# Patient Record
Sex: MALE | Race: WHITE | NOT HISPANIC OR LATINO | Employment: OTHER | URBAN - METROPOLITAN AREA
[De-identification: names, ages, dates, MRNs, and addresses within clinical notes are randomized per-mention and may not be internally consistent; named-entity substitution may affect disease eponyms.]

---

## 2017-05-24 ENCOUNTER — DOCUMENTATION ONLY (OUTPATIENT)
Dept: CARDIOLOGY | Facility: CLINIC | Age: 67
End: 2017-05-24

## 2017-05-26 ENCOUNTER — TRANSFERRED RECORDS (OUTPATIENT)
Dept: HEALTH INFORMATION MANAGEMENT | Facility: CLINIC | Age: 67
End: 2017-05-26

## 2017-09-29 ENCOUNTER — ALLIED HEALTH/NURSE VISIT (OUTPATIENT)
Dept: CARDIOLOGY | Facility: CLINIC | Age: 67
End: 2017-09-29
Payer: COMMERCIAL

## 2017-09-29 ENCOUNTER — OFFICE VISIT (OUTPATIENT)
Dept: CARDIOLOGY | Facility: CLINIC | Age: 67
End: 2017-09-29
Payer: COMMERCIAL

## 2017-09-29 VITALS
SYSTOLIC BLOOD PRESSURE: 137 MMHG | DIASTOLIC BLOOD PRESSURE: 76 MMHG | OXYGEN SATURATION: 97 % | HEIGHT: 69 IN | HEART RATE: 64 BPM | WEIGHT: 261.8 LBS | BODY MASS INDEX: 38.78 KG/M2

## 2017-09-29 DIAGNOSIS — E11.40 TYPE 2 DIABETES MELLITUS WITH DIABETIC NEUROPATHY, WITH LONG-TERM CURRENT USE OF INSULIN (H): ICD-10-CM

## 2017-09-29 DIAGNOSIS — I47.29 PAROXYSMAL VENTRICULAR TACHYCARDIA (H): ICD-10-CM

## 2017-09-29 DIAGNOSIS — Z79.4 TYPE 2 DIABETES MELLITUS WITH DIABETIC NEUROPATHY, WITH LONG-TERM CURRENT USE OF INSULIN (H): ICD-10-CM

## 2017-09-29 DIAGNOSIS — I25.10 CORONARY ARTERY DISEASE INVOLVING NATIVE CORONARY ARTERY OF NATIVE HEART WITHOUT ANGINA PECTORIS: Primary | ICD-10-CM

## 2017-09-29 DIAGNOSIS — Z95.810 ICD (IMPLANTABLE CARDIOVERTER-DEFIBRILLATOR) IN PLACE: Primary | ICD-10-CM

## 2017-09-29 DIAGNOSIS — Z95.810 AUTOMATIC IMPLANTABLE CARDIOVERTER-DEFIBRILLATOR IN SITU: ICD-10-CM

## 2017-09-29 DIAGNOSIS — I25.5 ISCHEMIC CARDIOMYOPATHY: ICD-10-CM

## 2017-09-29 DIAGNOSIS — Z95.1 S/P CABG (CORONARY ARTERY BYPASS GRAFT): ICD-10-CM

## 2017-09-29 PROCEDURE — 99204 OFFICE O/P NEW MOD 45 MIN: CPT | Mod: 25 | Performed by: INTERNAL MEDICINE

## 2017-09-29 PROCEDURE — 93000 ELECTROCARDIOGRAM COMPLETE: CPT | Performed by: INTERNAL MEDICINE

## 2017-09-29 PROCEDURE — 93283 PRGRMG EVAL IMPLANTABLE DFB: CPT | Performed by: INTERNAL MEDICINE

## 2017-09-29 RX ORDER — GABAPENTIN 300 MG/1
300 CAPSULE ORAL
COMMUNITY
End: 2017-10-26

## 2017-09-29 RX ORDER — NITROGLYCERIN 0.4 MG/1
0.4 TABLET SUBLINGUAL EVERY 5 MIN PRN
COMMUNITY

## 2017-09-29 RX ORDER — METFORMIN HCL 500 MG
500 TABLET, EXTENDED RELEASE 24 HR ORAL
COMMUNITY

## 2017-09-29 RX ORDER — ATORVASTATIN CALCIUM 80 MG/1
80 TABLET, FILM COATED ORAL DAILY
COMMUNITY
End: 2022-05-18

## 2017-09-29 RX ORDER — LISINOPRIL 40 MG/1
40 TABLET ORAL DAILY
COMMUNITY
End: 2022-03-15 | Stop reason: DRUGHIGH

## 2017-09-29 RX ORDER — SPIRONOLACTONE 25 MG/1
75 TABLET ORAL DAILY
COMMUNITY
End: 2022-05-18

## 2017-09-29 RX ORDER — FEXOFENADINE HCL 60 MG/1
60 TABLET, FILM COATED ORAL 2 TIMES DAILY
COMMUNITY
End: 2017-10-26

## 2017-09-29 RX ORDER — METOPROLOL SUCCINATE 100 MG/1
200 TABLET, EXTENDED RELEASE ORAL DAILY
COMMUNITY
End: 2022-03-15 | Stop reason: DRUGHIGH

## 2017-09-29 NOTE — MR AVS SNAPSHOT
After Visit Summary   9/29/2017    Go Saavedra    MRN: [id number]           Patient Information     Date Of Birth          1950        Visit Information        Provider Department      9/29/2017 1:30 PM Nga Deleon MD [facility] [place]        Today's Diagnoses     Coronary artery disease involving native coronary artery of native heart without angina pectoris    -  1    S/P CABG (coronary artery bypass graft)        Ischemic cardiomyopathy        Automatic implantable cardioverter-defibrillator in situ        Type 2 diabetes mellitus with diabetic neuropathy, with long-term current use of insulin (H)          Care Instructions    1. I recommend a heart ultrasound or transthoracic echocardiogram to assess your heart pump function. My nurse will call you with the results. If anything is unexpected, we will set up an office visit to discuss options.    2. Follow up in 1 year.    If you have any questions or concerns, please call my nurse Lexie Ridley at [phone].            Follow-ups after your visit        Additional Services     Follow-Up with Cardiologist                 Your next 10 appointments already scheduled     Jan 18, 2018  4:30 PM CST   Remote ICD Check with BEATTY DCR2   [facility] [place] ([facility])    [address] [place] [id number] [phone]           This appointment is for a remote check of your debrillator.  This is not an appointment at the office.              Future tests that were ordered for you today     Open Future Orders        Priority Expected Expires Ordered    Echocardiogram Routine 10/2/2017 9/29/2018 9/29/2017    Follow-Up with Cardiologist Routine 8/29/2018 9/29/2018 9/29/2017            Who to contact     If you have questions or need follow up information about today's clinic visit or your schedule please contact [facility]  "[facility]YS[facility] HE[facility] AT [place] directly at [phone].  Normal or non-critical lab and imaging results will be communicated to you by MyChart, letter or phone within 4 business days after the clinic has received the results. If you do not hear from us within 7 days, please contact the clinic through MyChart or phone. If you have a critical or abnormal lab result, we will notify you by phone as soon as possible.  Submit refill requests through Sun[employer] or call your pharmacy and they will forward the refill request to us. Please allow 3 business days for your refill to be completed.          Additional Information About Your Visit        fflickharHybrid Security Information     Suncore lets you send messages to your doctor, view your test results, renew your prescriptions, schedule appointments and more. To sign up, go to [email or web address] . Click on \"Log in\" on the left side of the screen, which will take you to the Welcome page. Then click on \"Sign up Now\" on the right side of the page.     You will be asked to enter the access code listed below, as well as some personal information. Please follow the directions to create your username and password.     Your access code is: [email or web address]  Expires: 2017  1:49 PM     Your access code will  in 90 days. If you need help or a new code, please call your [place] clinic or [phone].        Care EveryWhere ID     This is your Care EveryWhere ID. This could be used by other organizations to access your [place] medical records  [id number]        Your Vitals Were     Pulse Height Pulse Oximetry BMI (Body Mass Index)          64 1.74 m (5' 8.5\") 97% 39.23 kg/m2         Blood Pressure from Last 3 Encounters:   17 137/76    Weight from Last 3 Encounters:   17 118.8 kg (261 lb 12.8 oz)              We Performed the Following     EKG 12-lead complete w/read - Clinics        Primary Care Provider Office Phone # Fax #    Edwina Rodriguez [phone] "       [facility] [address] [place] [address]        Equal Access to Services     [facility] : Hadii aad ku hadlashello Sojuanali, waaxda luqadaha, qaybta kaalmada adepatida, pati yisselin hayaadee sandovalmaisha garibay cayden valentin. So [facility] [phone].    ATENCIÓN: Si habla español, tiene a owens disposición servicios gratuitos de asistencia lingüística. Llame al [phone].    We comply with applicable federal civil rights laws and [place] laws. We do not discriminate on the basis of race, color, national origin, age, disability, sex, sexual orientation, or gender identity.            Thank you!     Thank you for choosing [facility] AT [place]  for your care. Our goal is always to provide you with excellent care. Hearing back from our patients is one way we can continue to improve our services. Please take a few minutes to complete the written survey that you may receive in the mail after your visit with us. Thank you!             Your Updated Medication List - Protect others around you: Learn how to safely use, store and throw away your medicines at www.disposemymeds.org.          This list is accurate as of: 9/29/17  2:37 PM.  Always use your most recent med list.                   Brand Name Dispense Instructions for use Diagnosis    ASPIR-81 PO      Take 1 tablet by mouth daily        atorvastatin 80 MG tablet    LIPITOR     Take 80 mg by mouth daily        COENZYME Q-10 PO      Take 3 capsules by mouth daily        fexofenadine 60 MG tablet    ALLEGRA     Take 60 mg by mouth 2 times daily        gabapentin 300 MG capsule    NEURONTIN     Take 300 mg by mouth 2 capsules am, 3 capsules pm        IBUPROFEN PO      Take 200 mg by mouth every 6 hours as needed for moderate pain        LANTUS SOLOSTAR 100 UNIT/ML injection   Generic drug:  insulin glargine      Inject 58 Units Subcutaneous At Bedtime        lisinopril 40 MG tablet    PRINIVIL/ZESTRIL     Take 40 mg by  mouth daily        MELATONIN PO      Take 5 mg by mouth At Bedtime        metFORMIN 500 MG 24 hr tablet    GLUCOPHAGE-XR     Take 500 mg by mouth daily (with dinner)        metoprolol 100 MG 24 hr tablet    TOPROL-XL     Take 200 mg by mouth daily        nitroGLYcerin 0.4 MG sublingual tablet    NITROSTAT     Place 0.4 mg under the tongue every 5 minutes as needed for chest pain For chest pain place 1 tablet under the tongue every 5 minutes for 3 doses. If symptoms persist 5 minutes after 1st dose call 911.        NOVOFINE 30G X 8 MM   Generic drug:  insulin pen needle      Use 5 pen needles daily or as directed.        NovoLOG FLEXPEN 100 UNIT/ML injection   Generic drug:  insulin aspart      Inject 26 Units Subcutaneous 3 times daily (with meals)        spironolactone 25 MG tablet    ALDACTONE     Take 50 mg by mouth daily

## 2017-09-29 NOTE — PROGRESS NOTES
Dictation reference number - [id number]    REFERRING PROVIDER:  Edwina Rodriguez MD  [facility] [address] [place] [address]    PRIMARY CARE PROVIDER:  Edwina Rodriguez [facility] [address] [place] [address]        REVIEW OF SYSTEMS:  A comprehensive 10-point review of systems was completed and the pertinent positives are documented in the history of present illness.    Skin:  Negative     Eyes:  Positive for glasses  ENT:  Negative    Respiratory:  Positive for dyspnea on exertion  Cardiovascular:  Negative    Gastroenterology: Negative    Genitourinary:  Negative    Musculoskeletal:  Negative    Neurologic:  Positive for numbness or tingling of feet (neuropathy)  Psychiatric:  Negative    Heme/Lymph/Imm:  Negative    Endocrine:  Positive for diabetes    CURRENT MEDICATIONS:  Current Outpatient Prescriptions   Medication Sig Dispense Refill     atorvastatin (LIPITOR) 80 MG tablet Take 80 mg by mouth daily       Aspirin (ASPIR-81 PO) Take 1 tablet by mouth daily       COENZYME Q-10 PO Take 3 capsules by mouth daily       fexofenadine (ALLEGRA) 60 MG tablet Take 60 mg by mouth 2 times daily       gabapentin (NEURONTIN) 300 MG capsule Take 300 mg by mouth 2 capsules am, 3 capsules pm       IBUPROFEN PO Take 200 mg by mouth every 6 hours as needed for moderate pain       insulin aspart (NOVOLOG FLEXPEN) 100 UNIT/ML injection Inject 26 Units Subcutaneous 3 times daily (with meals)       insulin glargine (LANTUS SOLOSTAR) 100 UNIT/ML injection Inject 58 Units Subcutaneous At Bedtime       lisinopril (PRINIVIL/ZESTRIL) 40 MG tablet Take 40 mg by mouth daily       MELATONIN PO Take 5 mg by mouth At Bedtime       metFORMIN (GLUCOPHAGE-XR) 500 MG 24 hr tablet Take 500 mg by mouth daily (with dinner)       metoprolol (TOPROL-XL) 100 MG 24 hr tablet Take 200 mg by mouth daily       nitroGLYcerin (NITROSTAT) 0.4 MG sublingual tablet Place 0.4 mg under the tongue every 5  "minutes as needed for chest pain For chest pain place 1 tablet under the tongue every 5 minutes for 3 doses. If symptoms persist 5 minutes after 1st dose call 911.       insulin pen needle (NOVOFINE) 30G X 8 MM Use 5 pen needles daily or as directed.       spironolactone (ALDACTONE) 25 MG tablet Take 50 mg by mouth daily           ALLERGIES:  No known allergies.    PAST MEDICAL HISTORY:    Past Medical History:   Diagnosis Date     Coronary artery disease      Diabetes mellitus (H)      History of coronary artery bypass graft x 3 2008     Hyperlipidemia      Ischemic cardiomyopathy 2008     MI (myocardial infarction) (H) 11/2008    inferior     Ventricular tachycardia (H)        PAST SURGICAL HISTORY:    Past Surgical History:   Procedure Laterality Date     BYPASS GRAFT ARTERY CORONARY  11/2008    3 vessel w/ LIMA to LAD, SVG to Diagl, SVG to PDA     HC LEFT HEART CATHETERIZATION  10/2008    Emergent thrombectomy and stent to distal RCA     IMPLANT IMPLANTABLE CARDIOVERTER DEFIBRILLATOR  11/2008    dual chamber ICD       FAMILY HISTORY:    Family History   Problem Relation Age of Onset     CANCER Brother      DIABETES No family hx of      Hypertension No family hx of        SOCIAL HISTORY:    Social History     Social History     Marital status:      Spouse name: N/A     Number of children: N/A     Years of education: N/A     Social History Main Topics     Smoking status: Never Smoker     Smokeless tobacco: Never Used     Alcohol use No     Drug use: None     Sexual activity: Not Asked     Other Topics Concern     None     Social History Narrative       PHYSICAL EXAM:    Vitals: /76  Pulse 64  Ht 1.74 m (5' 8.5\")  Wt 118.8 kg (261 lb 12.8 oz)  SpO2 97%  BMI 39.23 kg/m2  Wt Readings from Last 5 Encounters:   09/29/17 118.8 kg (261 lb 12.8 oz)       Constitutional: Obese body habitus. Comfortable at rest. Cooperative, alert and oriented, well developed, well nourished.  Eyes: Pupils equal and round, " conjunctivae and lids unremarkable, sclera white, no xanthalasma,   ENT: Satisfactory dentition.  No cyanosis or pallor.  Neck: Carotid pulses are full and equal bilaterally, no carotid bruit, no thyromegaly     Respiratory: Normal respiratory effort with symmetrical chest wall movements and no use of accessory muscles. Good air entry with normal breath sounds and no rales or wheeze.  Cardiovascular: Normal jugular venous pulse and pressure.  Normal carotid pulse character and volume.  No carotid bruit.  Midline sternotomy scar is well healed. Pacemaker site is unremarkable. Apical impulse is undisplaced and normal to palpation without parasternal heave or thrill.  Heart sounds are normal and regular. No audible murmur. No S3, S4 or friction rub.    GI: Soft, nontender, no hepatosplenomegaly, no masses, active bowel sounds.    Skin: No rash, erythema, ecchymosis.  Musculoskeletal: Normal muscle tone and strength. Normal gait. No spinal deformities.  Neuropsychiatric: Oriented to time place and person.  Affect normal.  No gross motor deficits.  Extremities: No edema. No clubbing or deformities.            Encounter Diagnoses   Name Primary?     Coronary artery disease involving native coronary artery of native heart without angina pectoris Yes     S/P CABG (coronary artery bypass graft)      Ischemic cardiomyopathy      Automatic implantable cardioverter-defibrillator in situ      Type 2 diabetes mellitus with diabetic neuropathy, with long-term current use of insulin (H)        Orders Placed This Encounter   Procedures     Follow-Up with Cardiologist     EKG 12-lead complete w/read - Clinics     Echocardiogram       CC  REFERRING PROVIDER:  Edwina Rodriguez MD  [facility] [address] [place] [address]

## 2017-09-29 NOTE — LETTER
9/29/2017    Edwina Rodriguez MD  [facility]  [address] [place] [address]    RE: Go Saavedra       Dear Colleague,    I had the pleasure of seeing Go Saavedra in the [facility].    REFERRING PROVIDER:  Edwina Rodriguez MD       REASONS FOR VISIT:   1.  Followup of coronary artery disease, status post previous inferior myocardial infarction and CAD x3 in 2008.   2.  Followup of ischemic cardiomyopathy.     The patient is known to Cardiology and was previously seen by my cardiologist colleague, Dr. Adam Morillo, but has been lost to followup since 2011.  He is a 67-year-old, obese  gentleman, a retired , who is known to have coronary artery disease, status post inferior myocardial infarction in 2008.  Because his symptoms were heartburn rather than classical angina, he presented late 5 days later and had a completed infarct.  His ejection fraction at that time was 35%.  He did have a late and unsuccessful revascularization to the right coronary artery and subsequently underwent CABG x3 (LIMA to LAD, vein graft to diagonal and posterior descending artery) at [facility] in 11/2008.  Subsequently, due to his LVEF of 35% and nonsustained ventricular tachycardia, he had a defibrillator put in.  In 2009, he had a defibrillator discharge while exercising on the treadmill, but essentially has been asymptomatic since then.  His last LVEF was 35%-40% by echo in 11/2008 and subsequently improved to 42% on echo in 01/2009.  The patient has remained angina free since then.  He was last seen in Cardiology in 2011 and has been lost to followup since then.  However, he gets his regular primary care by Dr. Rodriguez, who has done an excellent job managing his cardiac medications.      The patient's primary risk factors are obesity, history of hypertension, type 2 diabetes mellitus (on insulin and complicated by neuropathy, HbA1c 6.7%) and  dyslipidemia with a low HDL.  He has no family history of ischemic heart disease or sudden death.  He is a lifelong never tobacco user.  He has not been screened for sleep apnea, and clinical questioning for this suggests low probability, although he is centrally obese.  He had his ICD interrogated earlier today, and it showed nonsustained VT for 10 beats at a heart rate of 156 BPM, during which he was asymptomatic.  There was no anti-tachycardic pacing or ICD discharge.  His pacemaker is a [employer] Teligen device, which is at mode of DDDR with 6% pacing and no V pacing and an underlying rhythm of sinus with excellent heart rate variability.  The device longevity is approximately 3 years.      I reviewed his labs from Care Everywhere, and his most recent lipid profile in 03/2017 shows a total cholesterol of 132, HDL 30, triglycerides 112 and LDL 80.  Hemoglobin A1c is 6.7%.  There is no recent TSH.      CURRENT MEDICATIONS:   1.  Atorvastatin 80 mg daily.   2.  Aspirin 81 mg daily.   3.  Insulin.    4.  Lisinopril 40 mg daily (increased today from 20-40 mg by Edwina Rodriguez).   5.  Metformin 500 mg daily.   6.  Metoprolol  mg daily.   7.  Spironolactone 50 mg daily.   8.  Sublingual nitroglycerin.   9.  Ibuprofen as needed, very rare.     10.  Fexofenadine 60 mg b.i.d.    11.  Gabapentin for neuropathy.   12.  Coenzyme Q10.      ALLERGIES:  No medication allergies.      See my attached note in Epic for a comprehensive review of systems, medications, past medical history, surgical history, social history and family history.        PHYSICAL EXAMINATION:   VITAL SIGNS:  Blood pressure 137/76 mmHg, pulse 64 per minute and regular, height 1.74 m, weight 118.8 kg (261 pounds), respiratory rate 16 per minute, saturations 97% on room air, BMI 39.23 kg/m2.      The patient is centrally obese and comfortable at rest.  His midline sternotomy scar is well healed.  The pacemaker device site looks unremarkable.  Heart  sounds are normal, regular without any murmur, S3 or S4.  There is no lower extremity edema.  Bilateral lower extremities show no edema.      DIAGNOSTIC DATA:    I reviewed pertinent laboratory data from Care Everywhere as documented above.  I also reviewed his previous echocardiograms, the most recent in 2011.  LVEF was 42% with inferior wall hypokinesis.  No significant valve disease.      ECG done today shows sinus rhythm, 62 beats per minute with inferior Q waves suggesting old infarct and nonspecific T-wave changes.  Normal TN of 160 msec and QTc of 427 msec.      ICD device interrogation:  As documented in HPI.      DIAGNOSES:   1.  Stable coronary artery disease, status post inferior myocardial infarction and CABG x3 (LIMA to LAD, vein grafts to diagonal and PDA) in 11/2008.   2.  Ischemic cardiomyopathy with last LVEF of 42% on echocardiogram in 2009.   3.  Status post automatic implantable cardioverter defibrillator in situ.   4.  Type 2 diabetes mellitus with diabetic neuropathy with long-term current use of insulin.   5.  Obesity (BMI 39.3 kg/m2).   6.  Hyperlipidemia (LDL 80 on 80 mg of atorvastatin).      The patient is almost 10 years out from his delayed presentation of inferior MI and CABG.  He has stable ischemic cardiomyopathy with no recurrence of angina.  He is on appropriate maximally tolerated medical therapy.  He golfs a couple of times a week and walks less than a block due to back pain, but at this level of exercise, he has not had recurrence of symptoms.  We should continue aggressive secondary/tertiary prevention.      PLAN:   1.  No changes to medications.   2.  Have TSH checked by Dr. Rodriguez's office in the near future.     3.  Transthoracic echocardiogram to assess his cardiomyopathy.  My office will call him with the results.   4.  Given his asymptomatic status and the fact that he is on maximal medical therapy, I do not think we need to do a routine stress test.  I counseled the  patient about symptoms to watch out for.   5.  Follow up in my clinic in 1 year.      It was a pleasure to visit with the patient.  I look forward to seeing him again.     Sincerely,    Nga Deleon MD     [facility]

## 2017-09-29 NOTE — MR AVS SNAPSHOT
"              After Visit Summary   9/29/2017    Go Saavedra    MRN: [id number]           Patient Information     Date Of Birth          1950        Visit Information        Provider Department      9/29/2017 1:00 PM BEATTY DCR2 [facility]        Today's Diagnoses     ICD (implantable cardioverter-defibrillator) in place    -  1    Paroxysmal ventricular tachycardia (H)           Follow-ups after your visit        Your next 10 appointments already scheduled     Jan 18, 2018  4:30 PM CST   Remote ICD Check with BEATTY DCR2   [facility] ([facility])    [address] [place] [id number] [phone]           This appointment is for a remote check of your debrillator.  This is not an appointment at the office.              Who to contact     If you have questions or need follow up information about today's clinic visit or your schedule please contact [facility] directly at [phone].  Normal or non-critical lab and imaging results will be communicated to you by Etherioshart, letter or phone within 4 business days after the clinic has received the results. If you do not hear from us within 7 days, please contact the clinic through MobiWorkt or phone. If you have a critical or abnormal lab result, we will notify you by phone as soon as possible.  Submit refill requests through [employer] or call your pharmacy and they will forward the refill request to us. Please allow 3 business days for your refill to be completed.          Additional Information About Your Visit        Etherioshart Information     [employer] lets you send messages to your doctor, view your test results, renew your prescriptions, schedule appointments and more. To sign up, go to [email or web address] . Click on \"Log in\" on the left side of the screen, which will take you to the Welcome page. Then click on " "\"Sign up Now\" on the right side of the page.     You will be asked to enter the access code listed below, as well as some personal information. Please follow the directions to create your username and password.     Your access code is: [email or web address]  Expires: 2017  1:49 PM     Your access code will  in 90 days. If you need help or a new code, please call your [place] clinic or [phone].        Care EveryWhere ID     This is your Care EveryWhere ID. This could be used by other organizations to access your [place] medical records  [id number]         Blood Pressure from Last 3 Encounters:   No data found for BP    Weight from Last 3 Encounters:   No data found for Wt              We Performed the Following     ICD DEVICE PROGRAMMING EVAL, DUAL LEAD ICD (88326)        Primary Care Provider Office Phone # Fax #    Edwina Rodriguez [phone]       [facility] [address] [place] [address]        Equal Access to Services     [facility]: Hadii aad ku hadasho Soomaali, waaxda luqadaha, qaybta kaalmada adeegyada, waxay idiin hayaan ale rodarte . So [facility] [phone].    ATENCIÓN: Si habla español, tiene a owens disposición servicios gratuitos de asistencia lingüística. Llame al [phone].    We comply with applicable federal civil rights laws and [place] laws. We do not discriminate on the basis of race, color, national origin, age, disability, sex, sexual orientation, or gender identity.            Thank you!     Thank you for choosing [facility] [place]  for your care. Our goal is always to provide you with excellent care. Hearing back from our patients is one way we can continue to improve our services. Please take a few minutes to complete the written survey that you may receive in the mail after your visit with us. Thank you!             Your Updated Medication List - Protect others around you: Learn how to " safely use, store and throw away your medicines at www.disposemymeds.org.      Notice  As of 9/29/2017  1:49 PM    You have not been prescribed any medications.

## 2017-09-29 NOTE — PROGRESS NOTES
REFERRING PROVIDER:  Edwina Rodriguez MD       REASONS FOR VISIT:   1.  Followup of coronary artery disease, status post previous inferior myocardial infarction and CAD x3 in 2008.   2.  Followup of ischemic cardiomyopathy.      HISTORY OF PRESENT ILLNESS:    The patient is known to Cardiology and was previously seen by my cardiologist colleague, Dr. Adam Morillo, but has been lost to followup since 2011.  He is a 67-year-old, obese  gentleman, a retired , who is known to have coronary artery disease, status post inferior myocardial infarction in 2008.  Because his symptoms were heartburn rather than classical angina, he presented late 5 days later and had a completed infarct.  His ejection fraction at that time was 35%.  He did have a late and unsuccessful revascularization to the right coronary artery and subsequently underwent CABG x3 (LIMA to LAD, vein graft to diagonal and posterior descending artery) at [facility] in 11/2008.  Subsequently, due to his LVEF of 35% and nonsustained ventricular tachycardia, he had a defibrillator put in.  In 2009, he had a defibrillator discharge while exercising on the treadmill, but essentially has been asymptomatic since then.  His last LVEF was 35%-40% by echo in 11/2008 and subsequently improved to 42% on echo in 01/2009.  The patient has remained angina free since then.  He was last seen in Cardiology in 2011 and has been lost to followup since then.  However, he gets his regular primary care by Dr. Rodriguez, who has done an excellent job managing his cardiac medications.      The patient's primary risk factors are obesity, history of hypertension, type 2 diabetes mellitus (on insulin and complicated by neuropathy, HbA1c 6.7%) and dyslipidemia with a low HDL.  He has no family history of ischemic heart disease or sudden death.  He is a lifelong never tobacco user.  He has not been screened for sleep apnea, and clinical questioning for this suggests low  probability, although he is centrally obese.  He had his ICD interrogated earlier today, and it showed nonsustained VT for 10 beats at a heart rate of 156 BPM, during which he was asymptomatic.  There was no anti-tachycardic pacing or ICD discharge.  His pacemaker is a [employer] Teligen device, which is at mode of DDDR with 6% pacing and no V pacing and an underlying rhythm of sinus with excellent heart rate variability.  The device longevity is approximately 3 years.      I reviewed his labs from [facility], and his most recent lipid profile in 03/2017 shows a total cholesterol of 132, HDL 30, triglycerides 112 and LDL 80.  Hemoglobin A1c is 6.7%.  There is no recent TSH.      CURRENT MEDICATIONS:   1.  Atorvastatin 80 mg daily.   2.  Aspirin 81 mg daily.   3.  Insulin.    4.  Lisinopril 40 mg daily (increased today from 20-40 mg by Edwina Rodriguez).   5.  Metformin 500 mg daily.   6.  Metoprolol  mg daily.   7.  Spironolactone 50 mg daily.   8.  Sublingual nitroglycerin.   9.  Ibuprofen as needed, very rare.     10.  Fexofenadine 60 mg b.i.d.    11.  Gabapentin for neuropathy.   12.  Coenzyme Q10.      ALLERGIES:  No medication allergies.      See my attached note in Epic for a comprehensive review of systems, medications, past medical history, surgical history, social history and family history.        PHYSICAL EXAMINATION:   VITAL SIGNS:  Blood pressure 137/76 mmHg, pulse 64 per minute and regular, height 1.74 m, weight 118.8 kg (261 pounds), respiratory rate 16 per minute, saturations 97% on room air, BMI 39.23 kg/m2.      The patient is centrally obese and comfortable at rest.  His midline sternotomy scar is well healed.  The pacemaker device site looks unremarkable.  Heart sounds are normal, regular without any murmur, S3 or S4.  There is no lower extremity edema.  Bilateral lower extremities show no edema.      DIAGNOSTIC DATA:    I reviewed pertinent laboratory data from [facility] as  documented above.  I also reviewed his previous echocardiograms, the most recent in 2011.  LVEF was 42% with inferior wall hypokinesis.  No significant valve disease.      ECG done today shows sinus rhythm, 62 beats per minute with inferior Q waves suggesting old infarct and nonspecific T-wave changes.  Normal HI of 160 msec and QTc of 427 msec.      ICD device interrogation:  As documented in HPI.      DIAGNOSES:   1.  Stable coronary artery disease, status post inferior myocardial infarction and CABG x3 (LIMA to LAD, vein grafts to diagonal and PDA) in 11/2008.   2.  Ischemic cardiomyopathy with last LVEF of 42% on echocardiogram in 2009.   3.  Status post automatic implantable cardioverter defibrillator in situ.   4.  Type 2 diabetes mellitus with diabetic neuropathy with long-term current use of insulin.   5.  Obesity (BMI 39.3 kg/m2).   6.  Hyperlipidemia (LDL 80 on 80 mg of atorvastatin).      The patient is almost 10 years out from his delayed presentation of inferior MI and CABG.  He has stable ischemic cardiomyopathy with no recurrence of angina.  He is on appropriate maximally tolerated medical therapy.  He golfs a couple of times a week and walks less than a block due to back pain, but at this level of exercise, he has not had recurrence of symptoms.  We should continue aggressive secondary/tertiary prevention.      PLAN:   1.  No changes to medications.   2.  Have TSH checked by Dr. Rodriguez's office in the near future.     3.  Transthoracic echocardiogram to assess his cardiomyopathy.  My office will call him with the results.   4.  Given his asymptomatic status and the fact that he is on maximal medical therapy, I do not think we need to do a routine stress test.  I counseled the patient about symptoms to watch out for.   5.  Follow up in my clinic in 1 year.      It was a pleasure to visit with the patient.  I look forward to seeing him again.      cc:   Edwina Rodriguez MD   [facility]    [id number] [place] [address]         MOTHILA GARRY ANGELES MD             D: 2017 14:53   T: 2017 16:36   MT: lata      Name:     AUDREY MENDEZ   MRN:      [id number]        Account:      [id number]   :      1950           Service Date: 2017      Document: [id number]

## 2017-09-29 NOTE — PATIENT INSTRUCTIONS
1. I recommend a heart ultrasound or transthoracic echocardiogram to assess your heart pump function. My nurse will call you with the results. If anything is unexpected, we will set up an office visit to discuss options.    2. Follow up in 1 year.    If you have any questions or concerns, please call my nurse Lexie Ridley at [phone].

## 2017-09-29 NOTE — PROGRESS NOTES
[employer] Teligen (D) ICD Device Check  AP: 6 % : 0 %  Mode: DDD         Underlying Rhythm: SR  Heart Rate: excellent variability  Sensing: WNL    Pacing Threshold: WNL    Impedance: WNL  Battery Status: 3 yrs estimated longevity, 10.9 second charge time  Atrial Arrhythmia: 322 episodes recorded since July 2015 (last in-office check). 3 EGMs available, all show noise on atrial channel, longest 6 seconds in duration. This is a known issue and we are monitoring.   Ventricular Arrhythmia: 30 episodes recorded since July 2015, 2 EGMs for review. First EGM shows PAT for 2 seconds (A=V, no change in morphology). Second EGM shows NSVT for 10 beats (V>A, change in morphology),  bpm.   ATP: none    Shocks: none  Setting Change: none    Care Plan: scheduled remote in 3 months. Pt says he is getting rid of his land line. Provided pt with new [employer] [employer] and a cellular adaptor. Send test send in clinic successfully. Pt seeing Dr. Deleon for OV today.   ANDRES RN

## 2017-10-17 ENCOUNTER — CARE COORDINATION (OUTPATIENT)
Dept: CARDIOLOGY | Facility: CLINIC | Age: 67
End: 2017-10-17

## 2017-10-17 NOTE — PROGRESS NOTES
Called to patient via cell and left message to call back to set up Echocardiogram as recommended by Dr Deleon.  Lexie Zhao RN 10/17/17 9:52 AM

## 2017-10-26 ENCOUNTER — HOSPITAL ENCOUNTER (OUTPATIENT)
Facility: CLINIC | Age: 67
Setting detail: OBSERVATION
Discharge: HOME | End: 2017-10-27
Attending: EMERGENCY MEDICINE | Admitting: INTERNAL MEDICINE
Payer: MEDICARE

## 2017-10-26 ENCOUNTER — APPOINTMENT (OUTPATIENT)
Dept: GENERAL RADIOLOGY | Facility: CLINIC | Age: 67
End: 2017-10-26
Attending: EMERGENCY MEDICINE
Payer: MEDICARE

## 2017-10-26 DIAGNOSIS — R07.9 CHEST PAIN: Primary | ICD-10-CM

## 2017-10-26 LAB
ALBUMIN SERPL-MCNC: 3.8 G/DL (ref 3.4–5)
ALP SERPL-CCNC: 53 U/L (ref 40–150)
ALT SERPL W P-5'-P-CCNC: 46 U/L (ref 0–70)
ANION GAP SERPL CALCULATED.3IONS-SCNC: 6 MMOL/L (ref 3–14)
AST SERPL W P-5'-P-CCNC: 28 U/L (ref 0–45)
BASOPHILS # BLD AUTO: 0 10E9/L (ref 0–0.2)
BASOPHILS NFR BLD AUTO: 0.3 %
BILIRUB SERPL-MCNC: 0.7 MG/DL (ref 0.2–1.3)
BUN SERPL-MCNC: 18 MG/DL (ref 7–30)
CALCIUM SERPL-MCNC: 9.2 MG/DL (ref 8.5–10.1)
CHLORIDE SERPL-SCNC: 108 MMOL/L (ref 94–109)
CO2 SERPL-SCNC: 24 MMOL/L (ref 20–32)
CREAT SERPL-MCNC: 0.76 MG/DL (ref 0.66–1.25)
DIFFERENTIAL METHOD BLD: NORMAL
EOSINOPHIL # BLD AUTO: 0 10E9/L (ref 0–0.7)
EOSINOPHIL NFR BLD AUTO: 0.4 %
ERYTHROCYTE [DISTWIDTH] IN BLOOD BY AUTOMATED COUNT: 13.8 % (ref 10–15)
GFR SERPL CREATININE-BSD FRML MDRD: >90 ML/MIN/1.7M2
GLUCOSE BLDC GLUCOMTR-MCNC: 113 MG/DL (ref 70–99)
GLUCOSE BLDC GLUCOMTR-MCNC: 161 MG/DL (ref 70–99)
GLUCOSE SERPL-MCNC: 203 MG/DL (ref 70–99)
HCT VFR BLD AUTO: 42.6 % (ref 40–53)
HGB BLD-MCNC: 15.1 G/DL (ref 13.3–17.7)
IMM GRANULOCYTES # BLD: 0 10E9/L (ref 0–0.4)
IMM GRANULOCYTES NFR BLD: 0 %
INTERPRETATION ECG - MUSE: NORMAL
LIPASE SERPL-CCNC: 179 U/L (ref 73–393)
LYMPHOCYTES # BLD AUTO: 1.4 10E9/L (ref 0.8–5.3)
LYMPHOCYTES NFR BLD AUTO: 19.8 %
MCH RBC QN AUTO: 30.4 PG (ref 26.5–33)
MCHC RBC AUTO-ENTMCNC: 35.4 G/DL (ref 31.5–36.5)
MCV RBC AUTO: 86 FL (ref 78–100)
MONOCYTES # BLD AUTO: 0.5 10E9/L (ref 0–1.3)
MONOCYTES NFR BLD AUTO: 6.7 %
NEUTROPHILS # BLD AUTO: 5 10E9/L (ref 1.6–8.3)
NEUTROPHILS NFR BLD AUTO: 72.8 %
NRBC # BLD AUTO: 0 10*3/UL
NRBC BLD AUTO-RTO: 0 /100
PLATELET # BLD AUTO: 231 10E9/L (ref 150–450)
POTASSIUM SERPL-SCNC: 4.7 MMOL/L (ref 3.4–5.3)
PROT SERPL-MCNC: 7.2 G/DL (ref 6.8–8.8)
RBC # BLD AUTO: 4.96 10E12/L (ref 4.4–5.9)
SODIUM SERPL-SCNC: 138 MMOL/L (ref 133–144)
TROPONIN I SERPL-MCNC: <0.015 UG/L (ref 0–0.04)
WBC # BLD AUTO: 6.8 10E9/L (ref 4–11)

## 2017-10-26 PROCEDURE — A9270 NON-COVERED ITEM OR SERVICE: HCPCS | Mod: GY | Performed by: INTERNAL MEDICINE

## 2017-10-26 PROCEDURE — B2131ZZ FLUOROSCOPY OF MULTIPLE CORONARY ARTERY BYPASS GRAFTS USING LOW OSMOLAR CONTRAST: ICD-10-PCS | Performed by: INTERNAL MEDICINE

## 2017-10-26 PROCEDURE — 85025 COMPLETE CBC W/AUTO DIFF WBC: CPT | Performed by: EMERGENCY MEDICINE

## 2017-10-26 PROCEDURE — B2171ZZ FLUOROSCOPY OF RIGHT INTERNAL MAMMARY BYPASS GRAFT USING LOW OSMOLAR CONTRAST: ICD-10-PCS | Performed by: INTERNAL MEDICINE

## 2017-10-26 PROCEDURE — 71020 XR CHEST 2 VW: CPT

## 2017-10-26 PROCEDURE — 93005 ELECTROCARDIOGRAM TRACING: CPT

## 2017-10-26 PROCEDURE — 40000065 ZZH STATISTIC EKG NON-CHARGEABLE

## 2017-10-26 PROCEDURE — 36415 COLL VENOUS BLD VENIPUNCTURE: CPT | Performed by: INTERNAL MEDICINE

## 2017-10-26 PROCEDURE — 99285 EMERGENCY DEPT VISIT HI MDM: CPT | Mod: 25

## 2017-10-26 PROCEDURE — 84484 ASSAY OF TROPONIN QUANT: CPT | Performed by: EMERGENCY MEDICINE

## 2017-10-26 PROCEDURE — G0378 HOSPITAL OBSERVATION PER HR: HCPCS

## 2017-10-26 PROCEDURE — A9270 NON-COVERED ITEM OR SERVICE: HCPCS | Mod: GY | Performed by: EMERGENCY MEDICINE

## 2017-10-26 PROCEDURE — B2111ZZ FLUOROSCOPY OF MULTIPLE CORONARY ARTERIES USING LOW OSMOLAR CONTRAST: ICD-10-PCS | Performed by: INTERNAL MEDICINE

## 2017-10-26 PROCEDURE — 83690 ASSAY OF LIPASE: CPT | Performed by: EMERGENCY MEDICINE

## 2017-10-26 PROCEDURE — 99220 ZZC INITIAL OBSERVATION CARE,LEVL III: CPT | Performed by: INTERNAL MEDICINE

## 2017-10-26 PROCEDURE — 25000132 ZZH RX MED GY IP 250 OP 250 PS 637: Mod: GY | Performed by: INTERNAL MEDICINE

## 2017-10-26 PROCEDURE — 80053 COMPREHEN METABOLIC PANEL: CPT | Performed by: EMERGENCY MEDICINE

## 2017-10-26 PROCEDURE — 00000146 ZZHCL STATISTIC GLUCOSE BY METER IP

## 2017-10-26 PROCEDURE — 84484 ASSAY OF TROPONIN QUANT: CPT | Performed by: INTERNAL MEDICINE

## 2017-10-26 PROCEDURE — 25000132 ZZH RX MED GY IP 250 OP 250 PS 637: Mod: GY | Performed by: EMERGENCY MEDICINE

## 2017-10-26 RX ORDER — ATORVASTATIN CALCIUM 80 MG/1
80 TABLET, FILM COATED ORAL DAILY
Status: DISCONTINUED | OUTPATIENT
Start: 2017-10-27 | End: 2017-10-27 | Stop reason: HOSPADM

## 2017-10-26 RX ORDER — ACETAMINOPHEN 325 MG/1
650 TABLET ORAL EVERY 4 HOURS PRN
Status: DISCONTINUED | OUTPATIENT
Start: 2017-10-26 | End: 2017-10-27

## 2017-10-26 RX ORDER — NITROGLYCERIN 0.4 MG/1
0.4 TABLET SUBLINGUAL EVERY 5 MIN PRN
Status: DISCONTINUED | OUTPATIENT
Start: 2017-10-26 | End: 2017-10-26

## 2017-10-26 RX ORDER — GABAPENTIN 600 MG/1
600 TABLET ORAL EVERY MORNING
Status: DISCONTINUED | OUTPATIENT
Start: 2017-10-27 | End: 2017-10-27 | Stop reason: HOSPADM

## 2017-10-26 RX ORDER — NALOXONE HYDROCHLORIDE 0.4 MG/ML
.1-.4 INJECTION, SOLUTION INTRAMUSCULAR; INTRAVENOUS; SUBCUTANEOUS
Status: DISCONTINUED | OUTPATIENT
Start: 2017-10-26 | End: 2017-10-27

## 2017-10-26 RX ORDER — METOPROLOL SUCCINATE 100 MG/1
200 TABLET, EXTENDED RELEASE ORAL DAILY
Status: DISCONTINUED | OUTPATIENT
Start: 2017-10-27 | End: 2017-10-27 | Stop reason: HOSPADM

## 2017-10-26 RX ORDER — NICOTINE POLACRILEX 4 MG
15-30 LOZENGE BUCCAL
Status: DISCONTINUED | OUTPATIENT
Start: 2017-10-26 | End: 2017-10-27 | Stop reason: HOSPADM

## 2017-10-26 RX ORDER — ASPIRIN 81 MG/1
81 TABLET ORAL DAILY
Status: DISCONTINUED | OUTPATIENT
Start: 2017-10-27 | End: 2017-10-27 | Stop reason: DRUGHIGH

## 2017-10-26 RX ORDER — ACETAMINOPHEN 650 MG/1
650 SUPPOSITORY RECTAL EVERY 4 HOURS PRN
Status: DISCONTINUED | OUTPATIENT
Start: 2017-10-26 | End: 2017-10-27 | Stop reason: HOSPADM

## 2017-10-26 RX ORDER — DEXTROSE MONOHYDRATE 25 G/50ML
25-50 INJECTION, SOLUTION INTRAVENOUS
Status: DISCONTINUED | OUTPATIENT
Start: 2017-10-26 | End: 2017-10-27 | Stop reason: HOSPADM

## 2017-10-26 RX ORDER — LIDOCAINE 40 MG/G
CREAM TOPICAL
Status: DISCONTINUED | OUTPATIENT
Start: 2017-10-26 | End: 2017-10-27

## 2017-10-26 RX ORDER — NITROGLYCERIN 0.4 MG/1
0.4 TABLET SUBLINGUAL EVERY 5 MIN PRN
Status: DISCONTINUED | OUTPATIENT
Start: 2017-10-26 | End: 2017-10-27 | Stop reason: HOSPADM

## 2017-10-26 RX ORDER — SPIRONOLACTONE 25 MG/1
50 TABLET ORAL DAILY
Status: DISCONTINUED | OUTPATIENT
Start: 2017-10-27 | End: 2017-10-27 | Stop reason: HOSPADM

## 2017-10-26 RX ORDER — ASPIRIN 81 MG/1
243 TABLET, CHEWABLE ORAL ONCE
Status: COMPLETED | OUTPATIENT
Start: 2017-10-26 | End: 2017-10-26

## 2017-10-26 RX ORDER — ASPIRIN 81 MG/1
162 TABLET, CHEWABLE ORAL ONCE
Status: COMPLETED | OUTPATIENT
Start: 2017-10-26 | End: 2017-10-26

## 2017-10-26 RX ORDER — ALUMINA, MAGNESIA, AND SIMETHICONE 2400; 2400; 240 MG/30ML; MG/30ML; MG/30ML
15-30 SUSPENSION ORAL EVERY 4 HOURS PRN
Status: DISCONTINUED | OUTPATIENT
Start: 2017-10-26 | End: 2017-10-27 | Stop reason: HOSPADM

## 2017-10-26 RX ORDER — LISINOPRIL 40 MG/1
40 TABLET ORAL DAILY
Status: DISCONTINUED | OUTPATIENT
Start: 2017-10-27 | End: 2017-10-27 | Stop reason: HOSPADM

## 2017-10-26 RX ORDER — GABAPENTIN 300 MG/1
900 CAPSULE ORAL EVERY EVENING
Status: DISCONTINUED | OUTPATIENT
Start: 2017-10-26 | End: 2017-10-27 | Stop reason: HOSPADM

## 2017-10-26 RX ADMIN — ASPIRIN 81 MG 243 MG: 81 TABLET ORAL at 11:48

## 2017-10-26 RX ADMIN — NITROGLYCERIN 0.4 MG: 0.4 TABLET SUBLINGUAL at 11:49

## 2017-10-26 RX ADMIN — ASPIRIN 81 MG 162 MG: 81 TABLET ORAL at 15:31

## 2017-10-26 ASSESSMENT — ENCOUNTER SYMPTOMS
CHEST TIGHTNESS: 1
COUGH: 0
NAUSEA: 1
DIAPHORESIS: 0
SHORTNESS OF BREATH: 0
FEVER: 0

## 2017-10-26 NOTE — ED PROVIDER NOTES
"  History     Chief Complaint:  Chest Pain    HPI   Go Saavedra is a 67 year old male with a history of myocardial infarction, coronary artery bypass, diabetes, and coronary artery disease who presents to the emergency department today for evaluation of chest tightness. The patient reports he felt fine yesterday when he was golfing and before he went to bed, but this morning at 0400 he felt non-radiating chest tightness with severity of 7/10 to 8/10 so he took two doses of nitroglycerin, but pain persisted with nausea and indigestion so he took two more doses of nitroglycerin approximately at 0700, with moderate relief. The patient reports his symptoms are now a 3/10, but he is concerned because he had symptoms of indigestion prior to his first heart attack in October of 2008. The patient reports he has not taken a STRESS test since or had a follow up for 4-5 years until 3 weeks ago when he saw a new cardiologist for a routine visit who made plans to set up a \"heart pump.\" The patient denies a history of smoking. The patient denies shortness of breath, diaphoresis, cough, congestion, or fevers.    Allergies:  No Known Drug Allergies    Medications:    Atorvastatin  Aspirin  Allegra  Neurontin  Ibuprofen  Insulin  Lisinopril  Melatonin  Metformin  Metoprolol  Nitrostat  Aldactone     Past Medical History:    Coronary artery disease   Diabetes mellitus   History of coronary artery bypass graft x 3   Hyperlipidemia   Ischemic cardiomyopathy   MI (myocardial infarction)   Ventricular tachycardia     Past Surgical History:    Bypass graft artery coronary   Left heart catheterization  Implant implantable cardioverter defibrillator     Family History:    Brother: Cancer    Social History:  The patient was accompanied to the ED by wife.  Smoking Status: Never Smoker  Smokeless Tobacco: Never Used  Alcohol Use: Negative   Marital Status:   [2]     Review of Systems   Constitutional: Negative for diaphoresis and " "fever.   HENT: Negative for congestion.    Respiratory: Positive for chest tightness. Negative for cough and shortness of breath.    Cardiovascular: Positive for chest pain.   Gastrointestinal: Positive for nausea.   All other systems reviewed and are negative.    Physical Exam     Patient Vitals for the past 24 hrs:   BP Temp Temp src Pulse Heart Rate Resp SpO2 Height Weight   10/26/17 1332 149/69 96.7  F (35.9  C) Oral 57 56 18 96 % 1.727 m (5' 8\") 117.1 kg (258 lb 1.6 oz)   10/26/17 1315 119/72 - - - 60 12 - - -   10/26/17 1300 138/77 - - - 62 19 - - -   10/26/17 1245 137/71 - - - 71 15 - - -   10/26/17 1230 135/69 - - - 69 22 - - -   10/26/17 1215 113/81 - - - - - - - -   10/26/17 1145 - - - - 61 26 - - -   10/26/17 1131 (!) 181/91 98  F (36.7  C) Oral 69 - 20 96 % 1.727 m (5' 8\") 117 kg (258 lb)   10/26/17 1130 - - - - - - 96 % - -     Physical Exam  General: Appears well-developed and well-nourished.   Head: No signs of trauma.   CV: Normal rate and regular rhythm.    Resp: Effort normal and breath sounds normal. No respiratory distress.   GI: Soft. There is no tenderness or guarding.  Normal bowel sounds.  No CVA tenderness.  MSK: Normal range of motion. no edema. No Calf tenderness.  Neuro: The patient is alert and oriented.  Speech normal.  GCS 15  Skin: Skin is warm and dry. No rash noted.   Psych: normal mood and affect. behavior is normal.       Emergency Department Course     ECG:  ECG taken at 1129, ECG read at 1130  Atrial-paced rhythm with prolonged AV conduction with occasional supraventricular complexes and with frequent premature ventricular complexes  Inferior infarct, age undetermined  T wave abnormality, consider lateral ischemia   Abnormal ECG  Rate 76 bpm. MI interval 214 ms. QRS duration 104 ms. QT/QTc 410/461 ms. P-R-T axes 27 6 109.    Imaging:  Radiology findings were communicated with the patient who voiced understanding of the findings.    Chest XR,  PA & LAT  1. Left subclavian cardiac " device. Sternal wires. Otherwise  negative. Lungs are clear. No acute process.   ARLETTE CRAWLEY MD  Reading per radiology    Laboratory:  Laboratory findings were communicated with the patient who voiced understanding of the findings.    CBC: WBC 6.8, HGB 15.1,   Troponin (Collected 1135): <0.015  Lipase: 179  CMP: Glucose: 203 (H) o/w WNL (Creatinine 0.76)    Interventions:  1148 Aspirin 243 mg PO  1149 Nitroglycerin 0.4 mg Sublingual     Emergency Department Course:    1131 Nursing notes and vitals reviewed.    1133 I performed an exam of the patient as documented above.     1151 The patient was sent for a Chest XR,  PA & LAT while in the emergency department, results above.     1225 I discussed the treatment plan with the patient. They expressed understanding of this plan and consented to admission. I personally reviewed the imaging, ECG, and laboratory results with the patient and answered all related questions prior to admission.    1235 I discussed the patient's presentation and findings with Dr. Ibrahim, who will admit the patient to a monitored bed for further evaluation and treatment.    Impression & Plan      Medical Decision Making:  Go Saavedra is a 67 year old male who presents to the emergency department today for evaluation of chest pain. He states that he woke up around 4 AM with chest pain and nausea. He states it felt similar to when he had an MRI several years ago. He had taken nitroglycerin prior to arrival and had some improvement of his pain. ECG was obtained that did not show any concerning ST segment changes. We did give aspirin and nitroglycerin in the ER, and he had resolution of his pain.  Blood work was obtained including a troponin which was negative. Given the patient's prior cardiac history with very similar symptoms that he thought was indigestion and afterward had a heart attack a number of years ago, he will be admitted for further cardiac evaluation and monitoring.   He remained stable through his time in the ER.    Diagnosis:    ICD-10-CM    1. Chest pain R07.9 Troponin I - Now then in 6 hours x 2       Disposition:   The patient is admitted into the care of Dr. Ibrahim.    Scribe Disclosure:  Moraima BAEZ, am serving as a scribe at 11:29 AM on 10/26/2017 to document services personally performed by Zachariah Rios MD based on my observations and the provider's statements to me.     EMERGENCY DEPARTMENT       Zachariah Rios MD  10/26/17 2892

## 2017-10-26 NOTE — ED NOTES
"[facility]  ED Nurse Handoff Report    ED Chief complaint: Chest Pain (0400 central chest tightness woke pt from sleep with belching and nausea. Pain similar to when pt had an MI. Took a total of 4 Nitro at home.)      ED Diagnosis:   Final diagnoses:   None       Code Status: Full Code    Allergies: No Known Allergies    Activity level - Baseline/Home:  Independent    Activity Level - Current:   Independent     Needed?: No    Isolation: No  Infection: Not Applicable    Bariatric?: No    Vital Signs:   Vitals:    10/26/17 1130 10/26/17 1131 10/26/17 1145 10/26/17 1215   BP:  (!) 181/91  113/81   Pulse:  69     Resp:  20 26    Temp:  98  F (36.7  C)     TempSrc:  Oral     SpO2: 96% 96%     Weight:  117 kg (258 lb)     Height:  1.727 m (5' 8\")         Cardiac Rhythm: ,   Cardiac  Cardiac Rhythm: Normal sinus rhythm (occational paced beat)    Pain level: 0-10 Pain Scale: 0    Is this patient confused?: No    Patient Report: Initial Complaint: chest pain  Focused Assessment: chest pain started this early am, took 4 total nitro at home with some relief, took 81mg ASA at home. Pt with pacer/defib, hr has been SR60s with occational paced beat. Pain currenlty gone after 1 nitro in the ER. Pt indep, alert and oriented.   Tests Performed: labs, cxray  Abnormal Results:   Results for orders placed or performed during the hospital encounter of 10/26/17   Chest XR,  PA & LAT    Narrative    XR CHEST 2 VW  10/26/2017 12:03 PM    HISTORY:  chest pain    COMPARISON:  5/24/2009      Impression    IMPRESSION:  Left subclavian cardiac device. Sternal wires. Otherwise  negative. Lungs are clear. No acute process.     ARLETTE CRAWLEY MD   CBC with platelets + differential   Result Value Ref Range    WBC 6.8 4.0 - 11.0 10e9/L    RBC Count 4.96 4.4 - 5.9 10e12/L    Hemoglobin 15.1 13.3 - 17.7 g/dL    Hematocrit 42.6 40.0 - 53.0 %    MCV 86 78 - 100 fl    MCH 30.4 26.5 - 33.0 pg    MCHC 35.4 31.5 - 36.5 g/dL    " RDW 13.8 10.0 - 15.0 %    Platelet Count 231 150 - 450 10e9/L    Diff Method Automated Method     % Neutrophils 72.8 %    % Lymphocytes 19.8 %    % Monocytes 6.7 %    % Eosinophils 0.4 %    % Basophils 0.3 %    % Immature Granulocytes 0.0 %    Nucleated RBCs 0 0 /100    Absolute Neutrophil 5.0 1.6 - 8.3 10e9/L    Absolute Lymphocytes 1.4 0.8 - 5.3 10e9/L    Absolute Monocytes 0.5 0.0 - 1.3 10e9/L    Absolute Eosinophils 0.0 0.0 - 0.7 10e9/L    Absolute Basophils 0.0 0.0 - 0.2 10e9/L    Abs Immature Granulocytes 0.0 0 - 0.4 10e9/L    Absolute Nucleated RBC 0.0    Troponin I (now)   Result Value Ref Range    Troponin I ES <0.015 0.000 - 0.045 ug/L   Comprehensive metabolic panel   Result Value Ref Range    Sodium 138 133 - 144 mmol/L    Potassium 4.7 3.4 - 5.3 mmol/L    Chloride 108 94 - 109 mmol/L    Carbon Dioxide 24 20 - 32 mmol/L    Anion Gap 6 3 - 14 mmol/L    Glucose 203 (H) 70 - 99 mg/dL    Urea Nitrogen 18 7 - 30 mg/dL    Creatinine 0.76 0.66 - 1.25 mg/dL    GFR Estimate >90 >60 mL/min/1.7m2    GFR Estimate If Black >90 >60 mL/min/1.7m2    Calcium 9.2 8.5 - 10.1 mg/dL    Bilirubin Total 0.7 0.2 - 1.3 mg/dL    Albumin 3.8 3.4 - 5.0 g/dL    Protein Total 7.2 6.8 - 8.8 g/dL    Alkaline Phosphatase 53 40 - 150 U/L    ALT 46 0 - 70 U/L    AST 28 0 - 45 U/L   Lipase   Result Value Ref Range    Lipase 179 73 - 393 U/L   EKG 12 lead   Result Value Ref Range    Interpretation ECG Click View Image link to view waveform and result      Treatments provided: meds    Family Comments: wife gone    OBS brochure/video discussed/provided to patient: N/A    ED Medications:   Medications   nitroGLYcerin (NITROSTAT) sublingual tablet 0.4 mg (0.4 mg Sublingual Given 10/26/17 5032)   aspirin chewable tablet 243 mg (243 mg Oral Given 10/26/17 1148)       Drips infusing?:  No      ED NURSE PHONE NUMBER: [phone]

## 2017-10-26 NOTE — H&P
PRIMARY CARE PHYSICIAN:  Edwina Rodriguez MD       PRIMARY CARDIOLOGIST:  Dr. Deleon      CHIEF COMPLAINT:  Chest pain.      HISTORY OF PRESENT ILLNESS:  Go Saavedra is a very pleasant 67-year-old male with a history of hypertension and diabetes, coronary artery disease who had an inferior MI in 2008.  Because his symptoms were heartburn at that time, he waited 5 days until he came to the hospital and had a completed infarct and EF at that time was 35%.  The patient had a late unsuccessful revascularization attempt to the right coronary artery and subsequently underwent CABG x3 with LIMA to LAD, vein graft to diagonal and posterior descending artery at [place] in 11/2008.  Subsequently due to his LVEF of 35% and nonsustained V-tach, he had a defibrillator put in.  In 2009 he had the defibrillator discharge while exercising on the treadmill.  His last LVEF of 35% to 40% in 11/2008 and subsequently improved to 42%  in 01/2009.  The patient was last seen in Cardiology on 09/29/2017.  At that time he had not had any angina for many years.      The patient had ICD interrogated in 09/2017 when he was seen by his cardiologist.  He has risk factors of obesity, hypertension, type 2 diabetes and dyslipidemia with a low HDL.  There is no, however, family history of ischemic heart disease or sudden death.      The patient developed indigestion last night about 4:00 a.m.  He took 2 nitroglycerin at home which did not help.  Around 7:30 in the morning, the patient did take 2 more nitro which helped a little bit.  He said that his symptoms of indigestion were very similar to his prior inferior MI that he had in 2008.  The patient said that he felt fine yesterday when he was golfing, he does use a cart, and before he went to bed.  The patient had nausea and he rates his pain at the worst as 7/10.  In the emergency room, the patient had another nitroglycerin and currently is chest pain-free.  The patient says he has not had a stress  test for at least 4-5 years.  In the emergency room, the patient had an EKG which showed T-wave inversions in aVL and V6 which were new Q-waves were present in leads III and aVF and also paced with frequent PVCs.  The patient's first cardiac enzyme was less than 0.015.  The patient is going to be admitted under observation status given his positive history of coronary artery disease.      PAST MEDICAL HISTORY:   1.  History of coronary artery disease as described above.   2.  Type 2 diabetes, on Lantus and NovoLog.  The patient's last A1c was less than 7.   3.  History of hyperlipidemia with a low HDL, currently on atorvastatin.   4.  History of ventricular tachycardia.  The patient has an ICD.   5.  History of some diabetic neuropathy of the feet.      PAST SURGICAL HISTORY:   1.  History of coronary artery bypass grafting in  with LIMA to LAD, vein graft to diagonal and posterior descending artery.   2.  History of left heart cath, .   3.  History of implantable cardioverter defibrillator.      ALLERGIES:  No known drug allergies.      MEDICATIONS  ON ADMISSION   1.  Aspirin 81 mg daily.   2.  Atorvastatin 80 mg daily.   3.  Neurontin 600 mg every a.m.   4.  Neurontin 900 mg in the evening.   5.  Ibuprofen 200 mg every 6 hours p.r.n. moderate pain.   6.  Aspart insulin 26 units 3 times daily with meals.   7.  Glargine insulin 15 units subcu every morning.   8.  Lisinopril 40 mg daily.   9.  Metformin  mg daily.   10.  Metoprolol  mg tablets, takes 200 mg daily.   11.  Nitroglycerin 0.4 mg sublingually p.r.n. chest pain.    12.  Aldactone 25-50 mg daily.       FAMILY HISTORY:  Mother  at 98.  Father  in his mid 80s.  He had a history of a heart valve replacement, 1 brother alive but has pancreatic cancer.      SOCIAL HISTORY:  The patient is .  He is a never smoker.  He does not use alcohol.  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      REVIEW OF SYSTEMS:  A 10-point review of systems was reviewed with the patient and is negative.      PHYSICAL EXAMINATION:   GENERAL:  Pleasant male who appears comfortable at present.   VITAL SIGNS:  Blood pressure 181/91, pulse 69, respiratory rate 20, temperature of 98.   HEENT:  Pupils are equal.  Extraocular movements are intact.  Pharynx without erythema.   NECK:  Supple.   CHEST:  Clear to auscultation.   CARDIOVASCULAR:  Regular rate and rhythm, normal S1, S2.   ABDOMEN:  Positive bowel sounds, soft and nontender.   EXTREMITIES:  No edema.  Normal affect.   NEUROLOGIC:  Moves all extremities.  Cranial nerves grossly intact.      LABORATORY DATA:  Sodium 138, potassium 4.7, chloride 108, bicarbonate 24, BUN 18, creatinine 0.76, calcium 9.2.  LFTs within normal limits.  Troponin less than 0.015, glucose 203.  White count 6.8, hemoglobin 15.1, platelet count 231.  Chest x-ray showed left subclavian cardiac device with sternal wires, otherwise negative.  Lungs are clear, no acute process.      ASSESSMENT AND PLAN:  Go Saavedra is a very pleasant 67-year-old male with a history of MI and coronary artery disease with a history of a 3-vessel coronary artery bypass grafting in 2008 and also has type 2 diabetes which he says is under good control with an A1c of less than 7.  The patient initially presented in 2008 with indigestion he had recurrence of his symptoms this morning starting at 4:00 a.m. which are very similar to his prior symptoms when he had an MI.  He describes this as a tightness in the middle of his chest, but it did not radiate, but was associated with some slight shortness of breath and nausea.  At its worst it was 7/10 in severity.  He still had some when he came to the emergency room but this resolved with another nitroglycerin.  The patient had taken 2 nitroglycerin around 4:00 and 2 more around 7:30.  Concerning for angina in this patient with known heart disease, the  patient is going to be admitted under observation status.   1.  Chest pain, concerning for angina.  However, first troponin is negative and currently the patient is chest pain-free.  Patient will be admitted to observation.  We will get serial troponins, continue the patient on aspirin and will get a Lexiscan stress test done.  The patient because of his low back pain is unable to walk more than a block and due to this, the patient will not have an exercise stress test ordered.   2.  Type 2 diabetes.  The patient normally is on at 58 units in the morning of Lantus and 26 t.i.d. with aspart with meals.  For now will decrease Lantus aspart and monitor his glucose intake.  Aspart will be 20 units with meals and will have him on 45 units of Lantus in the morning.  This can be up titrated depending on glucose monitoring values.   3.  Hyperlipidemia.  The patient will be continued on his atorvastatin.  Lipids from 2017 show cholesterol 132, HDL 30, triglycerides 112 and LDL of 80.   4.  History of ventricular tachycardia.  The patient has an ICD and this was just interrogated on 2017.   5.  History of low back pain, but this is chronic.  Unfortunately, it does limit the patient's ability to exercise and will have an impact on this admission because he is probably not going to be able to do an exercise stress test.  The patient says he has been seen many specialists and also chiropractors and really there is nothing to be done about his low back pain.   6.  Code status:  Full code.   7.  Deep venous thrombosis prophylaxis.  The patient will be on SCDs.      DISPOSITION:  The patient will be admitted to observation status.         ANNIE AWAD MD             D: 10/26/2017 14:55   T: 10/26/2017 15:56   MT: CD      Name:     AUDREY MENDEZ   MRN:      [id number]        Account:      [id number]   :      1950           Admitted:     [id number]      Document: [id number]       cc: Edwina Rodriguez MD

## 2017-10-26 NOTE — PLAN OF CARE
Problem: Patient Care Overview  Goal: Plan of Care/Patient Progress Review  Outcome: Improving  Pt A&O. VsS,RA. Denied chest pain/discomfort. On combination diet. Plan for Lexiscan tomorrow. Trop 1/3 negative. Continue to monitor.

## 2017-10-26 NOTE — PHARMACY-ADMISSION MEDICATION HISTORY
Admission medication history interview status for the 10/26/2017  admission is complete. See EPIC admission navigator for prior to admission medications     Medication history source reliability:Good    Actions taken by pharmacist (provider contacted, etc):None     Additional medication history information not noted on PTA med list :None    Medication reconciliation/reorder completed by provider prior to medication history? No    Time spent in this activity: 15min    Prior to Admission medications    Medication Sig Last Dose Taking? Auth Provider   Gabapentin (NEURONTIN PO) Take 600 mg by mouth every morning 10/26/2017 at Unknown time Yes Unknown, Entered By History   Gabapentin (NEURONTIN PO) Take 900 mg by mouth every evening 10/25/2017 at Unknown time Yes Unknown, Entered By History   atorvastatin (LIPITOR) 80 MG tablet Take 80 mg by mouth daily 10/26/2017 at Unknown time Yes Reported, Patient   Aspirin (ASPIR-81 PO) Take 1 tablet by mouth daily 10/26/2017 at Unknown time Yes Reported, Patient   IBUPROFEN PO Take 200 mg by mouth every 6 hours as needed for moderate pain prn Yes Reported, Patient   insulin aspart (NOVOLOG FLEXPEN) 100 UNIT/ML injection Inject 26 Units Subcutaneous 3 times daily (with meals) 10/26/2017 at Unknown time Yes Reported, Patient   insulin glargine (LANTUS SOLOSTAR) 100 UNIT/ML injection Inject 58 Units Subcutaneous every morning  10/26/2017 at Unknown time Yes Reported, Patient   lisinopril (PRINIVIL/ZESTRIL) 40 MG tablet Take 40 mg by mouth daily 10/26/2017 at Unknown time Yes Reported, Patient   metFORMIN (GLUCOPHAGE-XR) 500 MG 24 hr tablet Take 500 mg by mouth daily (with dinner) 10/25/2017 at Unknown time Yes Reported, Patient   metoprolol (TOPROL-XL) 100 MG 24 hr tablet Take 200 mg by mouth daily 10/26/2017 at Unknown time Yes Reported, Patient   nitroGLYcerin (NITROSTAT) 0.4 MG sublingual tablet Place 0.4 mg under the tongue every 5 minutes as needed for chest pain For chest pain  place 1 tablet under the tongue every 5 minutes for 3 doses. If symptoms persist 5 minutes after 1st dose call 911. prn Yes Reported, Patient   spironolactone (ALDACTONE) 25 MG tablet Take 50 mg by mouth daily 10/26/2017 at Unknown time Yes Reported, Patient   insulin pen needle (NOVOFINE) 30G X 8 MM Use 5 pen needles daily or as directed.   Reported, Patient

## 2017-10-26 NOTE — IP AVS SNAPSHOT
MRN:[id number]                      After Visit Summary   10/26/2017    Go Saavedra    MRN: [id number]           Thank you!     Thank you for choosing [facility] for your care. Our goal is always to provide you with excellent care. Hearing back from our patients is one way we can continue to improve our services. Please take a few minutes to complete the written survey that you may receive in the mail after you visit with us. Thank you!        Patient Information     Date Of Birth          1950        About your hospital stay     You were admitted on:  October 26, 2017 You last received care in the:  [facility]    You were discharged on:  October 27, 2017        Reason for your hospital stay       You were admitted for chest pain                  Who to Call     For medical emergencies, please call 911.  For non-urgent questions about your medical care, please call your primary care provider or clinic, [phone]          Attending Provider     Provider Specialty    Zachariah Rios MD --    Yaz Ibrahim MD Internal Medicine       Primary Care Provider Office Phone # Fax #    Edwina Rodriguez [phone]      After Care Instructions     Activity       Your activity upon discharge: activity as tolerated            Diet       Follow this diet upon discharge:       Moderate Consistent CHO Diet                  Follow-up Appointments     Follow-up and recommended labs and tests        Follow up with primary care provider, Edwina Rodriguez, within 7 days for hospital follow- up.  No follow up labs or test are needed.                  Your next 10 appointments already scheduled     Jan 18, 2018  4:30 PM CST   Remote ICD Check with BEATTY DCR2   [facility] [place] ([facility])    [address] [place] [id number] [phone]           This appointment is for a remote check of  "your debrillator.  This is not an appointment at the office.              Pending Results     Date and Time Order Name Status Description    10/27/2017 1417 EKG 12-lead, tracing only Preliminary             Statement of Approval     Ordered          10/27/17 193  I have reviewed and agree with all the recommendations and orders detailed in this document.  EFFECTIVE NOW     Approved and electronically signed by:  Raul Velazquez MD             Admission Information     Date & Time Provider Department Dept. Phone    10/26/2017 Yaz Ibrahim MD [facility] [phone]      Your Vitals Were     Blood Pressure Pulse Temperature Respirations Height Weight    146/77 (BP Location: Left arm) 57 97.2  F (36.2  C) (Oral) 18 1.727 m (5' 8\") 117.1 kg (258 lb 1.6 oz)    Pulse Oximetry BMI (Body Mass Index)                95% 39.24 kg/m2          [employer] Information     [employer] lets you send messages to your doctor, view your test results, renew your prescriptions, schedule appointments and more. To sign up, go to [email or web address] . Click on \"Log in\" on the left side of the screen, which will take you to the Welcome page. Then click on \"Sign up Now\" on the right side of the page.     You will be asked to enter the access code listed below, as well as some personal information. Please follow the directions to create your username and password.     Your access code is: [email or web address]  Expires: 2017  1:49 PM     Your access code will  in 90 days. If you need help or a new code, please call your [facility] clinic or [phone].        Care EveryWhere ID     This is your Care EveryWhere ID. This could be used by other organizations to access your [facility] medical records  [id number]        Equal Access to Services     ELI WOODS : Alejandro Aragon, waaxda luqadaha, qaybta kaalmada sathya, pati valentin. So wa " [phone].    ATENCIÓN: Si sho stringer, tiene a owens disposición servicios gratuitos de asistencia lingüística. Joe sebastian [phone].    We comply with applicable federal civil rights laws and [place] laws. We do not discriminate on the basis of race, color, national origin, age, disability, sex, sexual orientation, or gender identity.               Review of your medicines      CONTINUE these medicines which have NOT CHANGED        Dose / Directions    ASPIR-81 PO        Dose:  1 tablet   Take 1 tablet by mouth daily   Refills:  0       atorvastatin 80 MG tablet   Commonly known as:  LIPITOR        Dose:  80 mg   Take 80 mg by mouth daily   Refills:  0       IBUPROFEN PO        Dose:  200 mg   Take 200 mg by mouth every 6 hours as needed for moderate pain   Refills:  0       LANTUS SOLOSTAR 100 UNIT/ML injection   Generic drug:  insulin glargine        Dose:  58 Units   Inject 58 Units Subcutaneous every morning   Refills:  0       lisinopril 40 MG tablet   Commonly known as:  PRINIVIL/ZESTRIL        Dose:  40 mg   Take 40 mg by mouth daily   Refills:  0       metFORMIN 500 MG 24 hr tablet   Commonly known as:  GLUCOPHAGE-XR        Dose:  500 mg   Take 500 mg by mouth daily (with dinner)   Refills:  0       metoprolol 100 MG 24 hr tablet   Commonly known as:  TOPROL-XL        Dose:  200 mg   Take 200 mg by mouth daily   Refills:  0       * NEURONTIN PO        Dose:  600 mg   Take 600 mg by mouth every morning   Refills:  0       * NEURONTIN PO        Dose:  900 mg   Take 900 mg by mouth every evening   Refills:  0       nitroGLYcerin 0.4 MG sublingual tablet   Commonly known as:  NITROSTAT        Dose:  0.4 mg   Place 0.4 mg under the tongue every 5 minutes as needed for chest pain For chest pain place 1 tablet under the tongue every 5 minutes for 3 doses. If symptoms persist 5 minutes after 1st dose call 911.   Refills:  0       NOVOFINE 30G X 8 MM   Generic drug:  insulin pen needle        Use 5 pen needles  daily or as directed.   Refills:  0       NovoLOG FLEXPEN 100 UNIT/ML injection   Generic drug:  insulin aspart        Dose:  26 Units   Inject 26 Units Subcutaneous 3 times daily (with meals)   Refills:  0       spironolactone 25 MG tablet   Commonly known as:  ALDACTONE        Dose:  50 mg   Take 50 mg by mouth daily   Refills:  0       * Notice:  This list has 2 medication(s) that are the same as other medications prescribed for you. Read the directions carefully, and ask your doctor or other care provider to review them with you.             Protect others around you: Learn how to safely use, store and throw away your medicines at www.disposemymeds.org.             Medication List: This is a list of all your medications and when to take them. Check marks below indicate your daily home schedule. Keep this list as a reference.      Medications           Morning Afternoon Evening Bedtime As Needed    ASPIR-81 PO   Take 1 tablet by mouth daily   Last time this was given:  243 mg on 10/27/2017  2:48 PM                                   atorvastatin 80 MG tablet   Commonly known as:  LIPITOR   Take 80 mg by mouth daily   Last time this was given:  80 mg on 10/27/2017  8:19 AM                                   IBUPROFEN PO   Take 200 mg by mouth every 6 hours as needed for moderate pain                                   LANTUS SOLOSTAR 100 UNIT/ML injection   Inject 58 Units Subcutaneous every morning   Generic drug:  insulin glargine                                   lisinopril 40 MG tablet   Commonly known as:  PRINIVIL/ZESTRIL   Take 40 mg by mouth daily   Last time this was given:  40 mg on 10/27/2017  8:19 AM                                   metFORMIN 500 MG 24 hr tablet   Commonly known as:  GLUCOPHAGE-XR   Take 500 mg by mouth daily (with dinner)                                   metoprolol 100 MG 24 hr tablet   Commonly known as:  TOPROL-XL   Take 200 mg by mouth daily   Next Dose Due:  Resume home schedule                                 * NEURONTIN PO   Take 600 mg by mouth every morning   Last time this was given:  600 mg on 10/27/2017  8:19 AM                                   * NEURONTIN PO   Take 900 mg by mouth every evening   Last time this was given:  600 mg on 10/27/2017  8:19 AM                                   nitroGLYcerin 0.4 MG sublingual tablet   Commonly known as:  NITROSTAT   Place 0.4 mg under the tongue every 5 minutes as needed for chest pain For chest pain place 1 tablet under the tongue every 5 minutes for 3 doses. If symptoms persist 5 minutes after 1st dose call 911.   Last time this was given:  0.4 mg on 10/26/2017 11:49 AM                                   NOVOFINE 30G X 8 MM   Use 5 pen needles daily or as directed.   Generic drug:  insulin pen needle                                NovoLOG FLEXPEN 100 UNIT/ML injection   Inject 26 Units Subcutaneous 3 times daily (with meals)   Generic drug:  insulin aspart   Next Dose Due:  Resume home schedule                                spironolactone 25 MG tablet   Commonly known as:  ALDACTONE   Take 50 mg by mouth daily   Last time this was given:  50 mg on 10/27/2017  8:19 AM                                   * Notice:  This list has 2 medication(s) that are the same as other medications prescribed for you. Read the directions carefully, and ask your doctor or other care provider to review them with you.              More Information        What Is Angina?  Angina is a warning that your heart muscle is not getting enough oxygen-rich blood and is at risk for damage. Medicines, certain medical procedures, and lifestyle changes can help control angina. Talk with your healthcare provider about how to prevent angina and what to do if you get it.    How does angina feel?  Angina is often described as chest pain, but this can be misleading. Angina is not always painful, and it isn t always felt in the chest. Angina might feel like:    Discomfort, aching,  tightness, or pressure that comes and goes. You may feel this in your chest, back, abdomen, arm, shoulder, neck, or jaw.    Tiredness that gets worse or you have more tiredness than usual for no clear reason    Shortness of breath while doing something that used to be easy    Heartburn, indigestion, nausea, or sweating  Call 911 right away if any of your symptoms lasts for more than a few minutes. Or if they go away and come back. Or if they happen at rest and don't go away after taking nitroglycerin. Or if they continue to get worse. You could be having a heart attack (acute myocardial infarction).  When does angina happen?    Angina usually happens during activity. It can also occur when you re upset or after a large meal. Sometimes angina can happen when the weather is too hot or too cold. All of these things can put more stress on your body and your heart.    You may have unstable angina if angina starts occurring more often, lasts longer, happens even when you are resting, or causes more discomfort. It s a sign that your heart problem may be getting worse. You need to call your healthcare provider right away.  Date Last Reviewed: 10/1/2016    4025-7893 [employer]. [address] [place] [address]. All rights reserved. This information is not intended as a substitute for professional medical care. Always follow your healthcare professional's instructions.                Discharge Instructions for Cardiac Catheterization  Cardiac catheterization is a procedure to look for blocked areas in the blood vessels that send blood to the heart. A thin, flexible tube (catheter) is put in a blood vessel in your groin or arm. The healthcare provider injects contrast fluid into your blood, which then flows to your heart. X-rays pictures are taken of your heart. Your provider will review the results with you. Be sure to ask any questions you have before you leave. This sheet will help you take care of  yourself at home.  Home care    Only do light and easy activities for the next 2 to 3 days. Ask for help with chores and errands while you recover. Have someone drive you to your appointments.    Don't lift anything heavy for a while. Your healthcare team will tell you when it's safe to lift again.    Ask your healthcare team when you can expect to return to work. Unless your job involves lifting, you may be able to return to your normal activities within a couple of days.    Take your medicines as directed. Don't skip doses.    Drink 6 to 8 glasses of water a day. This is to help flush the contrast dye out of your body. Call your healthcare team if your urine has any change in color.    Take your temperature each day for 7 days. If you feel cold and clammy or start sweating, take your temperature right away and call your healthcare team.    Check your incisions every day for signs of infection. These include redness, swelling, and drainage. It is normal to have a small bruise or bump where the catheter was inserted. A bruise that is getting larger is not normal and should be reported to your healthcare team. If you see blood forming in the incision, call your healthcare team. Go to the emergency department if you have uncontrolled bleeding from the artery site. This is especially true if you take medicines that make it difficult for your blood to clot. Examples are aspirin, clopidogrel, and warfarin.    Eat a healthy diet. Make sure it is low in fat, salt, and cholesterol. Ask your healthcare team for diet information.    Stop smoking. Enroll in a stop-smoking program or ask your healthcare team for help. Stop-smoking programs can be life saving.    Exercise as your healthcare team tells you to. Your healthcare team may recommend you start a cardiac rehabilitation program. Cardiac rehab is an exercise program in which trained healthcare staff watch your progress and stress on your heart while you exercise. Ask your  team how to enroll.    Don't swim or take baths until your healthcare team says it s OK. You can shower the day after the procedure. Keep the site clean and dry. This keeps the incision from getting wet and infected until the skin and artery can heal.  Follow-up care    Make a follow-up appointment as advised by our staff. It's common to have a follow-up appointment 2 to 4 weeks after an angioplasty or coronary stent procedure.    Make a yearly appointment, too. This is to make sure you are still doing well and not having any new symptoms.    Don't wait for a follow-up appointment if your medicines aren't working or you are having heart-related symptoms.  When to seek medical care  Call your healthcare provider right away if you have any of the following:    Chest pain    Constant or increasing pain or numbness in your leg    Fever of 100.4 F (38.0 C) or higher, or as directed by your healthcare provider    Symptoms of infection. These include redness, swelling, drainage, or warmth at the incision site.    Shortness of breath    A leg that feels cold or appears blue    Bleeding, bruising, or a lot of swelling where the catheter was inserted    Blood in your urine    Black or tarry stools    Any unusual bleeding   Date Last Reviewed: 10/1/2016    6039-2123 [employer]. [address] [place] [address], [place] [address]. All rights reserved. This information is not intended as a substitute for professional medical care. Always follow your healthcare professional's instructions.

## 2017-10-26 NOTE — PLAN OF CARE
Problem: Patient Care Overview  Goal: Plan of Care/Patient Progress Review  Outcome: Improving  Pt VSS, denies chest pain. Telemetry monitor on.  Ambulates independently in the room and halls. Plan to be NPO at midnight for scan in the a.m. Pt states understanding of plans. No further needs noted.

## 2017-10-26 NOTE — IP AVS SNAPSHOT
[facility]    [address] [place] [address], [address]    [place] [address]    Phone:  [phone]                                       After Visit Summary   10/26/2017    Go Saavedra    MRN: [id number]           After Visit Summary Signature Page     I have received my discharge instructions, and my questions have been answered. I have discussed any challenges I see with this plan with the nurse or doctor.    ..........................................................................................................................................  Patient/Patient Representative Signature      ..........................................................................................................................................  Patient Representative Print Name and Relationship to Patient    ..................................................               ................................................  Date                                            Time    ..........................................................................................................................................  Reviewed by Signature/Title    ...................................................              ..............................................  Date                                                            Time

## 2017-10-27 ENCOUNTER — APPOINTMENT (OUTPATIENT)
Dept: CARDIOLOGY | Facility: CLINIC | Age: 67
End: 2017-10-27
Attending: INTERNAL MEDICINE
Payer: MEDICARE

## 2017-10-27 ENCOUNTER — APPOINTMENT (OUTPATIENT)
Dept: NUCLEAR MEDICINE | Facility: CLINIC | Age: 67
End: 2017-10-27
Attending: INTERNAL MEDICINE
Payer: MEDICARE

## 2017-10-27 VITALS
SYSTOLIC BLOOD PRESSURE: 146 MMHG | HEART RATE: 57 BPM | HEIGHT: 68 IN | RESPIRATION RATE: 18 BRPM | BODY MASS INDEX: 39.12 KG/M2 | TEMPERATURE: 97.2 F | DIASTOLIC BLOOD PRESSURE: 77 MMHG | OXYGEN SATURATION: 95 % | WEIGHT: 258.1 LBS

## 2017-10-27 LAB
GLUCOSE BLDC GLUCOMTR-MCNC: 113 MG/DL (ref 70–99)
GLUCOSE BLDC GLUCOMTR-MCNC: 141 MG/DL (ref 70–99)
GLUCOSE BLDC GLUCOMTR-MCNC: 152 MG/DL (ref 70–99)
GLUCOSE BLDC GLUCOMTR-MCNC: 187 MG/DL (ref 70–99)
HBA1C MFR BLD: 7.1 % (ref 4.3–6)
POTASSIUM SERPL-SCNC: 3.9 MMOL/L (ref 3.4–5.3)

## 2017-10-27 PROCEDURE — 27210856 ZZH ACCESS HEART CATH CR2

## 2017-10-27 PROCEDURE — 93455 CORONARY ART/GRFT ANGIO S&I: CPT | Mod: 26 | Performed by: INTERNAL MEDICINE

## 2017-10-27 PROCEDURE — 93005 ELECTROCARDIOGRAM TRACING: CPT

## 2017-10-27 PROCEDURE — 93016 CV STRESS TEST SUPVJ ONLY: CPT | Performed by: INTERNAL MEDICINE

## 2017-10-27 PROCEDURE — 93018 CV STRESS TEST I&R ONLY: CPT | Performed by: INTERNAL MEDICINE

## 2017-10-27 PROCEDURE — 25000125 ZZHC RX 250: Performed by: INTERNAL MEDICINE

## 2017-10-27 PROCEDURE — 25000128 H RX IP 250 OP 636: Performed by: INTERNAL MEDICINE

## 2017-10-27 PROCEDURE — A9502 TC99M TETROFOSMIN: HCPCS | Performed by: INTERNAL MEDICINE

## 2017-10-27 PROCEDURE — G0378 HOSPITAL OBSERVATION PER HR: HCPCS

## 2017-10-27 PROCEDURE — 78452 HT MUSCLE IMAGE SPECT MULT: CPT | Mod: 26 | Performed by: INTERNAL MEDICINE

## 2017-10-27 PROCEDURE — 84132 ASSAY OF SERUM POTASSIUM: CPT | Performed by: INTERNAL MEDICINE

## 2017-10-27 PROCEDURE — 27210892 ZZH CATH CR4

## 2017-10-27 PROCEDURE — 34300033 ZZH RX 343: Performed by: INTERNAL MEDICINE

## 2017-10-27 PROCEDURE — 83036 HEMOGLOBIN GLYCOSYLATED A1C: CPT | Performed by: INTERNAL MEDICINE

## 2017-10-27 PROCEDURE — A9270 NON-COVERED ITEM OR SERVICE: HCPCS | Mod: GY | Performed by: INTERNAL MEDICINE

## 2017-10-27 PROCEDURE — C1769 GUIDE WIRE: HCPCS

## 2017-10-27 PROCEDURE — 36415 COLL VENOUS BLD VENIPUNCTURE: CPT | Performed by: INTERNAL MEDICINE

## 2017-10-27 PROCEDURE — 40000855 ZZH STATISTIC ECHO STRESS OR NM NPI

## 2017-10-27 PROCEDURE — 99152 MOD SED SAME PHYS/QHP 5/>YRS: CPT

## 2017-10-27 PROCEDURE — 27210787 ZZH MANIFOLD CR2

## 2017-10-27 PROCEDURE — 00000146 ZZHCL STATISTIC GLUCOSE BY METER IP

## 2017-10-27 PROCEDURE — 99214 OFFICE O/P EST MOD 30 MIN: CPT | Mod: 25 | Performed by: INTERNAL MEDICINE

## 2017-10-27 PROCEDURE — 93455 CORONARY ART/GRFT ANGIO S&I: CPT

## 2017-10-27 PROCEDURE — 25000132 ZZH RX MED GY IP 250 OP 250 PS 637: Mod: GY | Performed by: INTERNAL MEDICINE

## 2017-10-27 PROCEDURE — 99153 MOD SED SAME PHYS/QHP EA: CPT

## 2017-10-27 PROCEDURE — 99152 MOD SED SAME PHYS/QHP 5/>YRS: CPT | Mod: 59 | Performed by: INTERNAL MEDICINE

## 2017-10-27 PROCEDURE — 27211089 ZZH KIT ACIST INJECTOR CR3

## 2017-10-27 PROCEDURE — 93017 CV STRESS TEST TRACING ONLY: CPT

## 2017-10-27 PROCEDURE — 27210795 ZZH PAD DEFIB QUICK CR4

## 2017-10-27 PROCEDURE — 27210946 ZZH KIT HC TOTES DISP CR8

## 2017-10-27 PROCEDURE — 78452 HT MUSCLE IMAGE SPECT MULT: CPT

## 2017-10-27 PROCEDURE — 40000065 ZZH STATISTIC EKG NON-CHARGEABLE

## 2017-10-27 PROCEDURE — 27210914 ZZH SHEATH CR8

## 2017-10-27 RX ORDER — FLUMAZENIL 0.1 MG/ML
0.2 INJECTION, SOLUTION INTRAVENOUS
Status: DISCONTINUED | OUTPATIENT
Start: 2017-10-27 | End: 2017-10-27 | Stop reason: HOSPADM

## 2017-10-27 RX ORDER — LIDOCAINE HYDROCHLORIDE 10 MG/ML
1-10 INJECTION, SOLUTION EPIDURAL; INFILTRATION; INTRACAUDAL; PERINEURAL
Status: DISCONTINUED | OUTPATIENT
Start: 2017-10-27 | End: 2017-10-27 | Stop reason: HOSPADM

## 2017-10-27 RX ORDER — NITROGLYCERIN 5 MG/ML
100-500 VIAL (ML) INTRAVENOUS
Status: DISCONTINUED | OUTPATIENT
Start: 2017-10-27 | End: 2017-10-27 | Stop reason: HOSPADM

## 2017-10-27 RX ORDER — SODIUM CHLORIDE 9 MG/ML
INJECTION, SOLUTION INTRAVENOUS CONTINUOUS
Status: DISCONTINUED | OUTPATIENT
Start: 2017-10-27 | End: 2017-10-27 | Stop reason: HOSPADM

## 2017-10-27 RX ORDER — PRASUGREL 10 MG/1
10-60 TABLET, FILM COATED ORAL
Status: DISCONTINUED | OUTPATIENT
Start: 2017-10-27 | End: 2017-10-27 | Stop reason: HOSPADM

## 2017-10-27 RX ORDER — LIDOCAINE 40 MG/G
CREAM TOPICAL
Status: DISCONTINUED | OUTPATIENT
Start: 2017-10-27 | End: 2017-10-27 | Stop reason: HOSPADM

## 2017-10-27 RX ORDER — ADENOSINE 3 MG/ML
12-12000 INJECTION, SOLUTION INTRAVENOUS
Status: DISCONTINUED | OUTPATIENT
Start: 2017-10-27 | End: 2017-10-27 | Stop reason: HOSPADM

## 2017-10-27 RX ORDER — ATROPINE SULFATE 0.1 MG/ML
.5-1 INJECTION INTRAVENOUS
Status: DISCONTINUED | OUTPATIENT
Start: 2017-10-27 | End: 2017-10-27 | Stop reason: HOSPADM

## 2017-10-27 RX ORDER — PROMETHAZINE HYDROCHLORIDE 25 MG/ML
6.25-25 INJECTION, SOLUTION INTRAMUSCULAR; INTRAVENOUS EVERY 4 HOURS PRN
Status: DISCONTINUED | OUTPATIENT
Start: 2017-10-27 | End: 2017-10-27 | Stop reason: HOSPADM

## 2017-10-27 RX ORDER — POTASSIUM CHLORIDE 7.45 MG/ML
10 INJECTION INTRAVENOUS
Status: DISCONTINUED | OUTPATIENT
Start: 2017-10-27 | End: 2017-10-27 | Stop reason: HOSPADM

## 2017-10-27 RX ORDER — ENALAPRILAT 1.25 MG/ML
1.25-2.5 INJECTION INTRAVENOUS
Status: DISCONTINUED | OUTPATIENT
Start: 2017-10-27 | End: 2017-10-27 | Stop reason: HOSPADM

## 2017-10-27 RX ORDER — ATROPINE SULFATE 0.1 MG/ML
0.5 INJECTION INTRAVENOUS EVERY 5 MIN PRN
Status: DISCONTINUED | OUTPATIENT
Start: 2017-10-27 | End: 2017-10-27 | Stop reason: HOSPADM

## 2017-10-27 RX ORDER — ONDANSETRON 2 MG/ML
4 INJECTION INTRAMUSCULAR; INTRAVENOUS EVERY 4 HOURS PRN
Status: DISCONTINUED | OUTPATIENT
Start: 2017-10-27 | End: 2017-10-27 | Stop reason: HOSPADM

## 2017-10-27 RX ORDER — PROTAMINE SULFATE 10 MG/ML
1-5 INJECTION, SOLUTION INTRAVENOUS
Status: DISCONTINUED | OUTPATIENT
Start: 2017-10-27 | End: 2017-10-27 | Stop reason: HOSPADM

## 2017-10-27 RX ORDER — ALBUTEROL SULFATE 90 UG/1
2 AEROSOL, METERED RESPIRATORY (INHALATION) EVERY 5 MIN PRN
Status: DISCONTINUED | OUTPATIENT
Start: 2017-10-27 | End: 2017-10-27

## 2017-10-27 RX ORDER — POTASSIUM CHLORIDE 29.8 MG/ML
20 INJECTION INTRAVENOUS
Status: DISCONTINUED | OUTPATIENT
Start: 2017-10-27 | End: 2017-10-27 | Stop reason: HOSPADM

## 2017-10-27 RX ORDER — SODIUM NITROPRUSSIDE 25 MG/ML
100-200 INJECTION INTRAVENOUS
Status: DISCONTINUED | OUTPATIENT
Start: 2017-10-27 | End: 2017-10-27 | Stop reason: HOSPADM

## 2017-10-27 RX ORDER — AMINOPHYLLINE 25 MG/ML
50-100 INJECTION, SOLUTION INTRAVENOUS
Status: DISCONTINUED | OUTPATIENT
Start: 2017-10-27 | End: 2017-10-27

## 2017-10-27 RX ORDER — NIFEDIPINE 10 MG/1
10 CAPSULE ORAL
Status: DISCONTINUED | OUTPATIENT
Start: 2017-10-27 | End: 2017-10-27 | Stop reason: HOSPADM

## 2017-10-27 RX ORDER — HEPARIN SODIUM 1000 [USP'U]/ML
1000-10000 INJECTION, SOLUTION INTRAVENOUS; SUBCUTANEOUS EVERY 5 MIN PRN
Status: DISCONTINUED | OUTPATIENT
Start: 2017-10-27 | End: 2017-10-27 | Stop reason: HOSPADM

## 2017-10-27 RX ORDER — BUPIVACAINE HYDROCHLORIDE 2.5 MG/ML
1-10 INJECTION, SOLUTION EPIDURAL; INFILTRATION; INTRACAUDAL
Status: DISCONTINUED | OUTPATIENT
Start: 2017-10-27 | End: 2017-10-27 | Stop reason: HOSPADM

## 2017-10-27 RX ORDER — ACETAMINOPHEN 325 MG/1
325-650 TABLET ORAL EVERY 4 HOURS PRN
Status: DISCONTINUED | OUTPATIENT
Start: 2017-10-27 | End: 2017-10-27 | Stop reason: HOSPADM

## 2017-10-27 RX ORDER — NALOXONE HYDROCHLORIDE 0.4 MG/ML
0.4 INJECTION, SOLUTION INTRAMUSCULAR; INTRAVENOUS; SUBCUTANEOUS EVERY 5 MIN PRN
Status: DISCONTINUED | OUTPATIENT
Start: 2017-10-27 | End: 2017-10-27 | Stop reason: HOSPADM

## 2017-10-27 RX ORDER — ASPIRIN 81 MG/1
243 TABLET ORAL ONCE
Status: COMPLETED | OUTPATIENT
Start: 2017-10-27 | End: 2017-10-27

## 2017-10-27 RX ORDER — NITROGLYCERIN 0.4 MG/1
0.4 TABLET SUBLINGUAL EVERY 5 MIN PRN
Status: DISCONTINUED | OUTPATIENT
Start: 2017-10-27 | End: 2017-10-27 | Stop reason: HOSPADM

## 2017-10-27 RX ORDER — HYDRALAZINE HYDROCHLORIDE 20 MG/ML
10-20 INJECTION INTRAMUSCULAR; INTRAVENOUS
Status: DISCONTINUED | OUTPATIENT
Start: 2017-10-27 | End: 2017-10-27 | Stop reason: HOSPADM

## 2017-10-27 RX ORDER — HYDROCODONE BITARTRATE AND ACETAMINOPHEN 5; 325 MG/1; MG/1
1-2 TABLET ORAL EVERY 4 HOURS PRN
Status: DISCONTINUED | OUTPATIENT
Start: 2017-10-27 | End: 2017-10-27 | Stop reason: HOSPADM

## 2017-10-27 RX ORDER — CLOPIDOGREL 300 MG/1
300-600 TABLET, FILM COATED ORAL
Status: DISCONTINUED | OUTPATIENT
Start: 2017-10-27 | End: 2017-10-27 | Stop reason: HOSPADM

## 2017-10-27 RX ORDER — ACYCLOVIR 200 MG/1
0-1 CAPSULE ORAL
Status: DISCONTINUED | OUTPATIENT
Start: 2017-10-27 | End: 2017-10-27

## 2017-10-27 RX ORDER — NICARDIPINE HYDROCHLORIDE 2.5 MG/ML
100 INJECTION INTRAVENOUS
Status: DISCONTINUED | OUTPATIENT
Start: 2017-10-27 | End: 2017-10-27 | Stop reason: HOSPADM

## 2017-10-27 RX ORDER — VERAPAMIL HYDROCHLORIDE 2.5 MG/ML
1-2.5 INJECTION, SOLUTION INTRAVENOUS
Status: DISCONTINUED | OUTPATIENT
Start: 2017-10-27 | End: 2017-10-27 | Stop reason: HOSPADM

## 2017-10-27 RX ORDER — ASPIRIN 81 MG/1
81 TABLET ORAL DAILY
Status: DISCONTINUED | OUTPATIENT
Start: 2017-10-28 | End: 2017-10-27 | Stop reason: HOSPADM

## 2017-10-27 RX ORDER — LIDOCAINE HYDROCHLORIDE 10 MG/ML
30 INJECTION, SOLUTION EPIDURAL; INFILTRATION; INTRACAUDAL; PERINEURAL
Status: COMPLETED | OUTPATIENT
Start: 2017-10-27 | End: 2017-10-27

## 2017-10-27 RX ORDER — ASPIRIN 325 MG
325 TABLET ORAL
Status: DISCONTINUED | OUTPATIENT
Start: 2017-10-27 | End: 2017-10-27 | Stop reason: HOSPADM

## 2017-10-27 RX ORDER — MORPHINE SULFATE 2 MG/ML
1-2 INJECTION, SOLUTION INTRAMUSCULAR; INTRAVENOUS EVERY 5 MIN PRN
Status: DISCONTINUED | OUTPATIENT
Start: 2017-10-27 | End: 2017-10-27 | Stop reason: HOSPADM

## 2017-10-27 RX ORDER — METOPROLOL TARTRATE 1 MG/ML
5 INJECTION, SOLUTION INTRAVENOUS EVERY 5 MIN PRN
Status: DISCONTINUED | OUTPATIENT
Start: 2017-10-27 | End: 2017-10-27 | Stop reason: HOSPADM

## 2017-10-27 RX ORDER — FUROSEMIDE 10 MG/ML
20-100 INJECTION INTRAMUSCULAR; INTRAVENOUS
Status: DISCONTINUED | OUTPATIENT
Start: 2017-10-27 | End: 2017-10-27 | Stop reason: HOSPADM

## 2017-10-27 RX ORDER — LORAZEPAM 0.5 MG/1
0.5 TABLET ORAL
Status: DISCONTINUED | OUTPATIENT
Start: 2017-10-27 | End: 2017-10-27 | Stop reason: HOSPADM

## 2017-10-27 RX ORDER — FENTANYL CITRATE 50 UG/ML
25-50 INJECTION, SOLUTION INTRAMUSCULAR; INTRAVENOUS
Status: DISCONTINUED | OUTPATIENT
Start: 2017-10-27 | End: 2017-10-27 | Stop reason: HOSPADM

## 2017-10-27 RX ORDER — DIPHENHYDRAMINE HYDROCHLORIDE 50 MG/ML
25-50 INJECTION INTRAMUSCULAR; INTRAVENOUS
Status: DISCONTINUED | OUTPATIENT
Start: 2017-10-27 | End: 2017-10-27 | Stop reason: HOSPADM

## 2017-10-27 RX ORDER — LORAZEPAM 2 MG/ML
0.5 INJECTION INTRAMUSCULAR
Status: DISCONTINUED | OUTPATIENT
Start: 2017-10-27 | End: 2017-10-27 | Stop reason: HOSPADM

## 2017-10-27 RX ORDER — NITROGLYCERIN 20 MG/100ML
.07-1.71 INJECTION INTRAVENOUS CONTINUOUS PRN
Status: DISCONTINUED | OUTPATIENT
Start: 2017-10-27 | End: 2017-10-27 | Stop reason: HOSPADM

## 2017-10-27 RX ORDER — EPINEPHRINE 1 MG/ML
0.3 INJECTION, SOLUTION, CONCENTRATE INTRAVENOUS
Status: DISCONTINUED | OUTPATIENT
Start: 2017-10-27 | End: 2017-10-27 | Stop reason: HOSPADM

## 2017-10-27 RX ORDER — DOPAMINE HYDROCHLORIDE 160 MG/100ML
2-20 INJECTION, SOLUTION INTRAVENOUS CONTINUOUS PRN
Status: DISCONTINUED | OUTPATIENT
Start: 2017-10-27 | End: 2017-10-27 | Stop reason: HOSPADM

## 2017-10-27 RX ORDER — POTASSIUM CHLORIDE 1500 MG/1
20 TABLET, EXTENDED RELEASE ORAL
Status: DISCONTINUED | OUTPATIENT
Start: 2017-10-27 | End: 2017-10-27 | Stop reason: HOSPADM

## 2017-10-27 RX ORDER — NITROGLYCERIN 5 MG/ML
100-200 VIAL (ML) INTRAVENOUS
Status: DISCONTINUED | OUTPATIENT
Start: 2017-10-27 | End: 2017-10-27 | Stop reason: HOSPADM

## 2017-10-27 RX ORDER — REGADENOSON 0.08 MG/ML
0.4 INJECTION, SOLUTION INTRAVENOUS ONCE
Status: COMPLETED | OUTPATIENT
Start: 2017-10-27 | End: 2017-10-27

## 2017-10-27 RX ORDER — DOBUTAMINE HYDROCHLORIDE 200 MG/100ML
2-20 INJECTION INTRAVENOUS CONTINUOUS PRN
Status: DISCONTINUED | OUTPATIENT
Start: 2017-10-27 | End: 2017-10-27 | Stop reason: HOSPADM

## 2017-10-27 RX ORDER — METHYLPREDNISOLONE SODIUM SUCCINATE 125 MG/2ML
125 INJECTION, POWDER, LYOPHILIZED, FOR SOLUTION INTRAMUSCULAR; INTRAVENOUS
Status: DISCONTINUED | OUTPATIENT
Start: 2017-10-27 | End: 2017-10-27 | Stop reason: HOSPADM

## 2017-10-27 RX ORDER — CLOPIDOGREL BISULFATE 75 MG/1
75 TABLET ORAL
Status: DISCONTINUED | OUTPATIENT
Start: 2017-10-27 | End: 2017-10-27 | Stop reason: HOSPADM

## 2017-10-27 RX ORDER — PROTAMINE SULFATE 10 MG/ML
25-100 INJECTION, SOLUTION INTRAVENOUS EVERY 5 MIN PRN
Status: DISCONTINUED | OUTPATIENT
Start: 2017-10-27 | End: 2017-10-27 | Stop reason: HOSPADM

## 2017-10-27 RX ORDER — LORAZEPAM 2 MG/ML
.5-2 INJECTION INTRAMUSCULAR EVERY 4 HOURS PRN
Status: DISCONTINUED | OUTPATIENT
Start: 2017-10-27 | End: 2017-10-27 | Stop reason: HOSPADM

## 2017-10-27 RX ORDER — NALOXONE HYDROCHLORIDE 0.4 MG/ML
.2-.4 INJECTION, SOLUTION INTRAMUSCULAR; INTRAVENOUS; SUBCUTANEOUS
Status: DISCONTINUED | OUTPATIENT
Start: 2017-10-27 | End: 2017-10-27 | Stop reason: HOSPADM

## 2017-10-27 RX ORDER — PHENYLEPHRINE HCL IN 0.9% NACL 1 MG/10 ML
20-100 SYRINGE (ML) INTRAVENOUS
Status: DISCONTINUED | OUTPATIENT
Start: 2017-10-27 | End: 2017-10-27 | Stop reason: HOSPADM

## 2017-10-27 RX ORDER — NALOXONE HYDROCHLORIDE 0.4 MG/ML
.1-.4 INJECTION, SOLUTION INTRAMUSCULAR; INTRAVENOUS; SUBCUTANEOUS
Status: DISCONTINUED | OUTPATIENT
Start: 2017-10-27 | End: 2017-10-27 | Stop reason: HOSPADM

## 2017-10-27 RX ORDER — DEXTROSE MONOHYDRATE 25 G/50ML
12.5-5 INJECTION, SOLUTION INTRAVENOUS EVERY 30 MIN PRN
Status: DISCONTINUED | OUTPATIENT
Start: 2017-10-27 | End: 2017-10-27 | Stop reason: HOSPADM

## 2017-10-27 RX ORDER — IOPAMIDOL 755 MG/ML
60 INJECTION, SOLUTION INTRAVASCULAR ONCE
Status: COMPLETED | OUTPATIENT
Start: 2017-10-27 | End: 2017-10-27

## 2017-10-27 RX ORDER — ASPIRIN 81 MG/1
81-324 TABLET, CHEWABLE ORAL
Status: DISCONTINUED | OUTPATIENT
Start: 2017-10-27 | End: 2017-10-27 | Stop reason: HOSPADM

## 2017-10-27 RX ADMIN — MIDAZOLAM HYDROCHLORIDE 2 MG: 1 INJECTION, SOLUTION INTRAMUSCULAR; INTRAVENOUS at 16:10

## 2017-10-27 RX ADMIN — LISINOPRIL 40 MG: 40 TABLET ORAL at 08:19

## 2017-10-27 RX ADMIN — ASPIRIN 81 MG: 81 TABLET, COATED ORAL at 08:19

## 2017-10-27 RX ADMIN — MIDAZOLAM HYDROCHLORIDE 1 MG: 1 INJECTION, SOLUTION INTRAMUSCULAR; INTRAVENOUS at 16:34

## 2017-10-27 RX ADMIN — LIDOCAINE HYDROCHLORIDE 300 MG: 10 INJECTION, SOLUTION EPIDURAL; INFILTRATION; INTRACAUDAL; PERINEURAL at 16:10

## 2017-10-27 RX ADMIN — MIDAZOLAM HYDROCHLORIDE 1 MG: 1 INJECTION, SOLUTION INTRAMUSCULAR; INTRAVENOUS at 16:56

## 2017-10-27 RX ADMIN — SODIUM CHLORIDE: 9 INJECTION, SOLUTION INTRAVENOUS at 14:24

## 2017-10-27 RX ADMIN — FENTANYL CITRATE 50 MCG: 50 INJECTION, SOLUTION INTRAMUSCULAR; INTRAVENOUS at 16:55

## 2017-10-27 RX ADMIN — FENTANYL CITRATE 100 MCG: 50 INJECTION, SOLUTION INTRAMUSCULAR; INTRAVENOUS at 16:10

## 2017-10-27 RX ADMIN — TETROFOSMIN 35.1 MCI.: 1.38 INJECTION, POWDER, LYOPHILIZED, FOR SOLUTION INTRAVENOUS at 10:23

## 2017-10-27 RX ADMIN — TETROFOSMIN 12.2 MCI.: 1.38 INJECTION, POWDER, LYOPHILIZED, FOR SOLUTION INTRAVENOUS at 07:20

## 2017-10-27 RX ADMIN — ATORVASTATIN CALCIUM 80 MG: 40 TABLET, FILM COATED ORAL at 08:19

## 2017-10-27 RX ADMIN — MIDAZOLAM HYDROCHLORIDE 1 MG: 1 INJECTION, SOLUTION INTRAMUSCULAR; INTRAVENOUS at 16:23

## 2017-10-27 RX ADMIN — ASPIRIN 243 MG: 81 TABLET, COATED ORAL at 14:48

## 2017-10-27 RX ADMIN — IOPAMIDOL 60 ML: 755 INJECTION, SOLUTION INTRAVASCULAR at 17:15

## 2017-10-27 RX ADMIN — SPIRONOLACTONE 50 MG: 25 TABLET ORAL at 08:19

## 2017-10-27 RX ADMIN — REGADENOSON 0.4 MG: 0.08 INJECTION, SOLUTION INTRAVENOUS at 10:09

## 2017-10-27 RX ADMIN — GABAPENTIN 600 MG: 600 TABLET, FILM COATED ORAL at 08:19

## 2017-10-27 NOTE — PROGRESS NOTES
Lexiscan Stress: Pt tolerated well. VSS. Pt denied chest pain or pressure prior to injection. After injection pt reported SOB & nausea that resolved within 5 minutes. Denies chest pain, pressure or tightness.     1019 Pt transferred back to OBS rm 19 per w/c. Detailed report called to Lo Elmore RN.

## 2017-10-27 NOTE — PROGRESS NOTES
Consult note dictated.    68 yo man with known CAD, prior inferior MI and CABG x3 (LIMA to LAD, SVG to diagonal and SVG to PDA) who is admitted with chest pain concerning for UA. He underwent stress test which was read as showing medium-sized predominantly fixed inferior defect likely secondary to prior inferior MI and new perfusion defect in the LAD/diagonal distribution.     Discussed angiogram vs medical therapy. We will proceed graft and coronary angiogram

## 2017-10-27 NOTE — PROGRESS NOTES
[facility]    Hospitalist Progress Note    Assessment & Plan   Go Saavedra is a 67 year old male who was admitted on 10/26/2017.     67-year-old male with a history of MI and coronary artery disease with a history of a 3-vessel coronary artery bypass grafting in 2008 and also has type 2 diabetes which he says is under good control with an A1c of less than 7.  The patient initially presented in 2008 with indigestion he had recurrence of his symptoms this morning starting at 4:00 a.m. which are very similar to his prior symptoms when he had an MI.  He describes this as a tightness in the middle of his chest, but it did not radiate, but was associated with some slight shortness of breath and nausea.  At its worst it was 7/10 in severity.  He still had some when he came to the emergency room but this resolved with another nitroglycerin.  The patient had taken 2 nitroglycerin around 4:00 and 2 more around 7:30.  Concerning for angina in this patient with known heart disease, the patient is going to be admitted under observation status.     Chest pain, concerning for angina with known CAD of native vessel and native heart s/p 3-vessel CABG in 2008 with associated HTN and HLP:  --Serial troponin negative X3.    -Continue the patient on aspirin.   -The patient will be continued on his atorvastatin.  Lipids from 03/2017 show cholesterol 132, HDL 30, triglycerides 112 and LDL of 80.  -Resume PTA lisinopril 40 mg daily, metoprolol 200 mg daily, spironolactone 50 mg daily.  --Lexiscan positive/abnormal with new reversible defect involving the mid anterior wall which is reversible.    -Cardiology consult requested.      Type 2 diabetes, insulin dependent:    The patient normally is on at 58 units in the morning of Lantus and 26 t.i.d. with aspart with meals.    -Decrease Lantus aspart and monitor his glucose intake.  Aspart will be 20 units with meals and will have him on 45 units of Lantus in the  morning.      History of ventricular tachycardia:  The patient has an ICD and this was just interrogated on 09/29/2017.     History of low back pain, but this is chronic:   Unfortunately, it does limit the patient's ability to exercise and will have an impact on this admission because he is probably not going to be able to do an exercise stress test.  The patient says he has been seen many specialists and also chiropractors and really there is nothing to be done about his low back pain.     DVT Prophylaxis: Low Risk/Ambulatory with no VTE prophylaxis indicated  Code Status: Full Code    Disposition: Expected discharge unclear due to abnormal stress test with reversible ischemia.  Awaiting Cardiology recommendations.      The patient has been discussed with Dr. Ibrahim, who agrees with the assessment and plan at this time.  Dr. Ibrahim will evaluate the patient independently.      Chip Zee PA-C   [phone]    Interval History   Patient seated at the edge of his bed with family present in the room.  He denied fever, chills.  His last episode of chest pain was yesterday.  He felt short of breath during the lexiscan as well as nauseated.      Discussed Lexiscan results and plan to have Cardiology assess him.      -Data reviewed today: I reviewed all new labs and imaging results over the last 24 hours. I personally reviewed no images or EKG's today.    Physical Exam   Temp: 97.1  F (36.2  C) Temp src: Oral BP: 142/66 Pulse: 57 Heart Rate: 67 Resp: 18 SpO2: 97 % O2 Device: None (Room air)    Vitals:    10/26/17 1131 10/26/17 1332   Weight: 117 kg (258 lb) 117.1 kg (258 lb 1.6 oz)     Vital Signs with Ranges  Temp:  [96.2  F (35.7  C)-98  F (36.7  C)] 97.1  F (36.2  C)  Pulse:  [57-69] 57  Heart Rate:  [56-71] 67  Resp:  [12-26] 18  BP: (113-181)/(53-91) 142/66  SpO2:  [95 %-98 %] 97 %         Constitutional: Awake, alert, cooperative, no apparent distress.    ENT: Normocephalic, without obvious abnormality,  atraumatic, oral pharynx with moist mucus membranes, tonsils without erythema or exudates.  Neck: Supple, symmetrical, trachea midline, no adenopathy.  Pulmonary: No increased work of breathing, good air exchange, clear to auscultation bilaterally, no crackles or wheezing.  Cardiovascular: Regular rate and rhythm, normal S1 and S2, no S3 or S4, and no murmur noted.  GI: Normal bowel sounds, soft, non-distended, non-tender.  Obese.    Skin/Integumen: Clear.  Neuro: CN II-XII grossly intact.  Psych:  Alert and oriented x 3. Normal affect.  Extremities: No lower extremity edema noted, and non-TTP bilaterally.       Medications        technetium Tc 99m tetrofosmin 2UD study  3-42 mCi Intravenous every 2 hours     atorvastatin  80 mg Oral Daily     gabapentin (NEURONTIN) tablet 600 mg  600 mg Oral QAM     gabapentin  900 mg Oral QPM     lisinopril  40 mg Oral Daily     metoprolol  200 mg Oral Daily     spironolactone  50 mg Oral Daily     sodium chloride (PF)  3 mL Intracatheter Q8H     aspirin EC  81 mg Oral Daily     insulin aspart  20 Units Subcutaneous QAM AC     insulin aspart  20 Units Subcutaneous Daily with lunch     insulin aspart  20 Units Subcutaneous Daily with supper     insulin glargine  45 Units Subcutaneous QAM AC       Data     Recent Labs  Lab 10/26/17  2055 10/26/17  1526 10/26/17  1135   WBC  --   --  6.8   HGB  --   --  15.1   MCV  --   --  86   PLT  --   --  231   NA  --   --  138   POTASSIUM  --   --  4.7   CHLORIDE  --   --  108   CO2  --   --  24   BUN  --   --  18   CR  --   --  0.76   ANIONGAP  --   --  6   CLAIR  --   --  9.2   GLC  --   --  203*   ALBUMIN  --   --  3.8   PROTTOTAL  --   --  7.2   BILITOTAL  --   --  0.7   ALKPHOS  --   --  53   ALT  --   --  46   AST  --   --  28   LIPASE  --   --  179   TROPI <0.015 <0.015 <0.015       Recent Results (from the past 24 hour(s))   Chest XR,  PA & LAT    Narrative    XR CHEST 2 VW  10/26/2017 12:03 PM    HISTORY:  chest pain    COMPARISON:   5/24/2009      Impression    IMPRESSION:  Left subclavian cardiac device. Sternal wires. Otherwise  negative. Lungs are clear. No acute process.     ARLETTE CRAWLEY MD

## 2017-10-27 NOTE — PLAN OF CARE
Problem: Patient Care Overview  Goal: Plan of Care/Patient Progress Review  Outcome: Improving        Observation goals PRIOR TO DISCHARGE     Comments: List all goals to be met before discharge home:   - Serial troponins and stress test complete. - not met  - Seen and cleared by consultant if applicable- not met  - Adequate pain control on oral analgesia- met   - Vital signs normal or at patient baseline- met   - Safe disposition plan has been identified- not met  - Nurse to notify provider when observation goals have been met and patient is ready for discharge.

## 2017-10-27 NOTE — CONSULTS
I have examined the patient, reviewed the history, medications and pre procedural tests. History of 3 vessel CAB sp IMI in 2008 with unstable angina.  I have explained to the patient the risks of death, MI, stroke, hematoma, possible urgent bypass surgery for failed PCI, use of stents, thienopyridine agents, possible peripheral vascular complications, arrhythmia, the use of FFR in clinical decision-making and alternative of medical therapy alone in regards to left heart catheterization, left ventriculography, coronary angiography, graft angiography and possible percutaneous coronary intervention. The patient voiced understanding and wishes to proceed. The patient has a good right radial pulse, normal ulnar pulse and a normal Erasto's sign.

## 2017-10-27 NOTE — CONSULTS
CARDIOLOGY CONSULTATION      REASONS FOR CONSULTATION:  Chest pain and abnormal stress test.      HISTORY OF PRESENT ILLNESS:  Go Saavedra is a 67-year-old gentleman with known coronary artery disease and ischemic cardiomyopathy who was admitted to the hospital yesterday with chest pain.  Yesterday morning, he woke up with a severe substernal chest tightness.  He did not have any other associated symptoms.  He took 2 sublingual nitroglycerines and decided to go back to bed; however, the pain persisted.  At around 7:30, he took 2 additional nitroglycerin tablets and subsequently came to the Emergency Department.  Upon arriving in the ER, his pain subsided and eventually resolved.      EKG showed no acute ST-T change.  He subsequently had 3 negative troponins and proceeded to have a stress test this morning.  This was read as showing 2 perfusion defects.  There is a medium-sized perfusion defect that is mostly fixed in the inferior wall, likely secondary to prior inferior infarct.  There is another perfusion defect in the LAD diagonal distribution.      His coronary artery disease history dates back to 2008.  At that time, he suffered an inferior myocardial infarction.  He presented late. He tells me an attempt to revascularize him was unsuccessful.  He subsequently proceeded to have coronary artery bypass grafting x3 (LIMA to LAD, vein graft to diagonal and vein graft to posterior descending artery).  His ejection fraction post-CABG was 35%.  Due to persistent LV systolic dysfunction and nonsustained VTs, he ultimately had a defibrillator placed.  In 2009, he had ICD shock which was appropriate.  He has been asymptomatic since then, although he has not seen a cardiologist until last month.      ALLERGIES:  No known drug allergies.      PAST MEDICAL HISTORY:   1.  Coronary artery disease status post CABG x3.   2.  Obesity.   3.  Hypertension.   4.  Hyperlipidemia.   5.  Type 2 diabetes.      SOCIAL HISTORY:  He  is a lifetime nonsmoker and denies excessive alcohol use.      FAMILY HISTORY:  Positive for premature coronary artery disease.      REVIEW OF SYSTEMS:  Comprehensive review was performed and is essentially negative except what is mentioned in the HPI.      CURRENT MEDICATIONS:   1.  Aspirin 325 mg daily.   2.  Lipitor 80 mg daily.   3.  Lisinopril 40 mg daily.   5.  Spironolactone 50 mg daily.   6.  Insulin as directed.      PHYSICAL EXAMINATION:   VITAL SIGNS:  Blood pressure is 164/79, pulse is 57.   GENERAL:  He is resting and appears to be in no acute distress.   NECK:  Jugular venous pressure is normal.  Carotid upstroke is brisk.   LUNGS:  Clear to auscultation bilaterally.   HEART:  Normal S1, S2, no murmurs, rubs or gallops.   ABDOMEN:  Soft and nontender.   EXTREMITIES:  No edema, cyanosis or clubbing.   SKIN:  No rashes or lesions.   NEUROLOGIC:  No focal deficits.   HEENT:  Oropharynx is clear.  Mucous membranes are dry.      IMPRESSION, REPORT AND PLAN:   1.  Chest pain concerning for unstable angina.   2.  Abnormal stress test.   3.  Known coronary artery disease, status post previous coronary artery bypass graft.   4.  Diabetes.      The patient's presenting symptoms are concerning for unstable angina.  He did rule out for non-ST elevation myocardial infarction and had a stress test this morning.  This showed 2 defects as discussed above.      I did discuss potential diagnostic and therapeutic options moving forward.  Given his known coronary artery disease, presenting symptoms and now the abnormal stress test, we will proceed with coronary and graft angiography.  Discussed risks, benefits, goals, alternatives and complications of the procedure with him in detail.  He expressed understanding and would like to proceed.         JUAN MCCARTY MD             D: 10/27/2017 14:51   T: 10/27/2017 15:35   MT: TS      Name:     AUDREY MENDEZ   MRN:      [id number]        Account:       [id number]    :      1950           Consult Date:  10/27/2017      Document: [id number]

## 2017-10-27 NOTE — PROCEDURES
Procedure  1) CAG  2) graft angiography    Approach RFA 4 Fr  We were able to access left TR, however, we were unable to advance the wire above the level of the brachial artery ( previous elbow surgery)    Findings  Native vessels    LMCA no significant stenosis  LAD occluded mid vessel. Distal vessel fills via patent LIMA and has no significant stenosis distal to distal anastomosis  CX non dominant. Single large marginal with no significant stenosis  RCA occluded mid vessel. Distal fills via patent SVG to PDA. No focal significant stenosis distal to distal anastomosis.    Grafts  LIMA to mid LAD widely patent  SVG to PDA widely patent.   SVG to marginal. Old occlusion at origin    Assess: No indication for PCI.     Recommendation  Medical therapy    [place]

## 2017-10-27 NOTE — PROGRESS NOTES
Reviewed angiogram. No significant new lesions. Grafts are patent. Ok to discharge tonight after bedrest.

## 2017-10-27 NOTE — PLAN OF CARE
Problem: Patient Care Overview  Goal: Plan of Care/Patient Progress Review  Outcome: Improving  A&Ox4. VSS on RA. Up ind. Tele occasional A-paced. Denies Chest pain/pressure. Troponin x2 negative. Plans for lexiscan this am.           Observation goals PRIOR TO DISCHARGE     Comments: List all goals to be met before discharge home:   - Serial troponins and stress test complete. - not met  - Seen and cleared by consultant if applicable- not met  - Adequate pain control on oral analgesia- met   - Vital signs normal or at patient baseline- met   - Safe disposition plan has been identified- not met  - Nurse to notify provider when observation goals have been met and patient is ready for discharge.

## 2017-10-27 NOTE — PLAN OF CARE
Problem: Patient Care Overview  Goal: Plan of Care/Patient Progress Review  Outcome: No Change  Pt signed consent for Angiogram planned for this afternoon. Pt has been NPO for this procedure.

## 2017-10-28 NOTE — PLAN OF CARE
Problem: Patient Care Overview  Goal: Plan of Care/Patient Progress Review  Outcome: Improving  Approximately 1730, pt arrived from Cath lab after having coronary angio. A&Ox4; VSS; no c/o chest pain or sob. R groin CDI/ no hematoma; no bruit. L radial site WNL. C/o bilateral LEs numbness but baseline per pt's report. Will be off bedrest at 1930. Pt wants to go home tonight. Dr. Mix's note noted and Hospitalist notified regarding discharge orders. Oncoming RN updated and will follow up.

## 2017-10-31 LAB — INTERPRETATION ECG - MUSE: NORMAL

## 2017-11-01 NOTE — DISCHARGE SUMMARY
DATE OF ADMISSION:   10/26/2017   DATE OF DISCHARGE:  10/27/2017      DISCHARGE DIAGNOSES:   1.  Chest pain concerning for angina with known coronary artery disease of native vessel and native heart, status post 3-vessel coronary artery bypass grafting in 2008 with associated hypertension and hyperlipidemia.   2.  Insulin-dependent Type 2 diabetes.   3.  History of ventricular tachycardia, status post implantable cardioverter defibrillator placement.   4.  History of chronic low back pain.      DISCHARGE MEDICATIONS:       Review of your medicines      CONTINUE these medicines which have NOT CHANGED       Dose / Directions    ASPIR-81 PO        Dose:  1 tablet   Take 1 tablet by mouth daily   Refills:  0       atorvastatin 80 MG tablet   Commonly known as:  LIPITOR        Dose:  80 mg   Take 80 mg by mouth daily   Refills:  0       IBUPROFEN PO        Dose:  200 mg   Take 200 mg by mouth every 6 hours as needed for moderate pain   Refills:  0       LANTUS SOLOSTAR 100 UNIT/ML injection   Generic drug:  insulin glargine        Dose:  58 Units   Inject 58 Units Subcutaneous every morning   Refills:  0       lisinopril 40 MG tablet   Commonly known as:  PRINIVIL/ZESTRIL        Dose:  40 mg   Take 40 mg by mouth daily   Refills:  0       metFORMIN 500 MG 24 hr tablet   Commonly known as:  GLUCOPHAGE-XR        Dose:  500 mg   Take 500 mg by mouth daily (with dinner)   Refills:  0       metoprolol 100 MG 24 hr tablet   Commonly known as:  TOPROL-XL        Dose:  200 mg   Take 200 mg by mouth daily   Refills:  0       * NEURONTIN PO        Dose:  600 mg   Take 600 mg by mouth every morning   Refills:  0       * NEURONTIN PO        Dose:  900 mg   Take 900 mg by mouth every evening   Refills:  0       nitroGLYcerin 0.4 MG sublingual tablet   Commonly known as:  NITROSTAT        Dose:  0.4 mg   Place 0.4 mg under the tongue every 5 minutes as needed for chest pain For chest pain place 1 tablet under the tongue every 5  minutes for 3 doses. If symptoms persist 5 minutes after 1st dose call 911.   Refills:  0       NOVOFINE 30G X 8 MM   Generic drug:  insulin pen needle        Use 5 pen needles daily or as directed.   Refills:  0       NovoLOG FLEXPEN 100 UNIT/ML injection   Generic drug:  insulin aspart        Dose:  26 Units   Inject 26 Units Subcutaneous 3 times daily (with meals)   Refills:  0       spironolactone 25 MG tablet   Commonly known as:  ALDACTONE        Dose:  50 mg   Take 50 mg by mouth daily   Refills:  0       * Notice:  This list has 2 medication(s) that are the same as other medications prescribed for you. Read the directions carefully, and ask your doctor or other care provider to review them with you.            ALLERGIES:  No known drug allergies.      DISPOSITION:  Home.      FOLLOW-UP RECOMMENDATIONS:  The patient should see his Primary Care Physician within 1 week for hospital follow-up.      ACTIVITY:  As tolerated.      DIET:  Moderate carbohydrate diet with low salt intake.      CONSULTATIONS:  Cardiology.      LABORATORY, IMAGING AND PROCEDURES:   1.  Routine laboratory studies including complete blood count with platelets, differential, serial troponins, comprehensive metabolic panel, lipase level, blood glucose monitoring and potassium level.   2.  Two-view chest x-ray.   3.  EKG.   4.  Nuclear medicine LexiScan stress test.   5.  Coronary angiogram.      PENDING RESULTS:  None.      PHYSICAL EXAMINATION on day of discharge:  Please see Progress Note dictated by me earlier in the day on 10/27/2017.      HISTORY OF PRESENT ILLNESS:  The patient Go Saavedra is a 67-year-old male with a past medical history significant for coronary artery disease with known myocardial infarction, 3-vessel coronary artery bypass grafting in 2008, Type 2 diabetes, chronic low back pain, hypertension and hyperlipidemia.  He was admitted to Observation due to chest pain on 10/26/2017.  For full details please see  the History and Physical Examination dictated by Dr. Yaz Ibrahim.      HOSPITAL COURSE:   1.  Chest pain concerning for angina with known coronary artery disease of native vessel and native heart, status post 3-vessel coronary artery bypass grafting performed in 2008 with associated hypertension and hyperlipidemia:  The patient underwent a work-up that included serial troponins which were negative and EKG without changes.  He was continued on home medications including Lisinopril 40 mg daily, Metoprolol 200 mg daily, Spironolactone 50 mg daily, aspirin and atorvastatin.  The patient's LexiScan came back positive with defect involving the mid anterior wall which appeared to be reversible.  As such Cardiology was consulted, and the patient was taken to the Catheterization Laboratory.  There were no significant new lesions found, grafts were patent. and the patient was cleared by Cardiology for discharge following the procedure.  The patient was discharged later in the evening with resumption of all his home medications and plans to follow up with his Primary Care Physician within 1 week.   2.  Insulin-dependent Type 2 diabetes:  The patient's normal regimen of Lantus 58 units and    aspart 26 units 3 times daily with meals was adjusted due to decreased intake.  At discharge his home regimen was resumed.   3.  History of ventricular tachycardia.  The patient is status post implantable cardioverter defibrillator placement and had an interrogation performed on 09/29/2017 without changes made.  No interventions were necessary.   4.  Chronic low back pain which remained stable throughout this hospitalization, and no interventions were necessary.     CODE STATUS: Full code status.     The case was discussed with Dr. Raul Velazquez and Dr. Yaz Ibrahim who both agreed that the patient could be discharged.     The patient was assessed by Dr. Ibrahim earlier on 10/27/2017, the day of discharge.      TOTAL DISCHARGE  TIME:  Less than 30 minutes.         ANNIE AWAD MD       As dictated by CARRILLO ENGLE PA-C            D: 10/28/2017 07:05   T: 10/28/2017 10:58   MT: [id number]#[id number]      Name:     AUDREY MENDEZ   MRN:      [id number]        Account:        [id number]   :      1950           Admit Date:     [id number]                                  Discharge Date: 10/27/2017      Document: [id number]       cc: Raul Rodriguez MD

## 2018-01-09 NOTE — PROGRESS NOTES
1/8/2018 Call to patient has not had echocardiogram or followup since discharge on 10/2017 - left message to call to review and schedule or leave message if he is followiing up with another cardiology group.  Lexie Zhao RN 01/09/18 1:56 PM

## 2018-01-16 ENCOUNTER — TELEPHONE (OUTPATIENT)
Dept: CARDIOLOGY | Facility: CLINIC | Age: 68
End: 2018-01-16

## 2018-01-16 DIAGNOSIS — I25.5 ISCHEMIC CARDIOMYOPATHY: Primary | ICD-10-CM

## 2018-01-16 NOTE — TELEPHONE ENCOUNTER
Called to patient - he agrees to set up echo per DR Deleon.   Transferred to scheduling.  Patient states feeling well and does not need to see provider.  Lexie Zhao RN 01/16/18 1:52 PM    FW: Outpatient echo  Received: 1 week ago       Nga Deleon MD McMahon, Bullis Mary RN                    Primary     He hasn't followed up since his hospitalization. Please check with him if he wants to follow-up. If he has, he will require a transthoracic echocardiogram prior to return visit.     Thank you.   SJ            Previous Messages       ----- Message -----      From: Cece Ridley RN      Sent: 11/22/2017   8:29 AM        To: Nga Deleon MD   Subject: FW: Outpatient echo                                Dr Pancho Lima was hospitalized and cathed since you sent this message.  Echo was not done. Do you still want?   Lexie       ----- Message -----      From: Nga Deleon MD      Sent: 10/17/2017   9:16 AM        To: Cece Ridley RN   Subject: Outpatient echo                                   Lexie, patient was supposed to have a transthoracic echocardiogram. Could you follow up on it, please?     Thanks.   SEPIDEH

## 2018-01-18 ENCOUNTER — ALLIED HEALTH/NURSE VISIT (OUTPATIENT)
Dept: CARDIOLOGY | Facility: CLINIC | Age: 68
End: 2018-01-18
Payer: COMMERCIAL

## 2018-01-18 DIAGNOSIS — Z95.810 ICD (IMPLANTABLE CARDIOVERTER-DEFIBRILLATOR) IN PLACE: Primary | ICD-10-CM

## 2018-01-18 DIAGNOSIS — I25.5 ISCHEMIC CARDIOMYOPATHY: ICD-10-CM

## 2018-01-18 PROCEDURE — 93295 DEV INTERROG REMOTE 1/2/MLT: CPT | Performed by: INTERNAL MEDICINE

## 2018-01-18 PROCEDURE — 93296 REM INTERROG EVL PM/IDS: CPT | Performed by: INTERNAL MEDICINE

## 2018-01-18 NOTE — PROGRESS NOTES
[employer] (D) ICD Remote Device Check  AP: 15 % : 0 %  Mode: DDD 60/130        Presenting Rhythm: Sinus with a first degree AV block  Heart Rate: Stable with adequate variability  Sensing: WNL    Pacing Threshold: Not on auto capture    Impedance: WNL  Battery Status: Estimated at 2.5 years remaining longevity  Atrial Arrhythmia: See below  Ventricular Arrhythmia: 3 NSVT logged with 2 stored EGM's for review. These both showed short bursts of 1:1 conducted PAT with a PAC initiating run.   ATP: 0    Shocks: 0    Care Plan: Next remote in 3 months. Saw Dr Deleon in office 9-2017. MICKEY Villalobos

## 2018-01-18 NOTE — MR AVS SNAPSHOT
After Visit Summary   1/18/2018    Go Saavedra    MRN: [id number]           Patient Information     Date Of Birth          1950        Visit Information        Provider Department      1/18/2018 4:30 PM BEATTY DCR2 [facility]        Today's Diagnoses     ICD (implantable cardioverter-defibrillator) in place    -  1    Ischemic cardiomyopathy           Follow-ups after your visit        Your next 10 appointments already scheduled     Jan 18, 2018  4:30 PM CST   Remote ICD Check with BEATTY DCR2   [facility] ([facility])    [address]  [facility] [id number] [phone]           This appointment is for a remote check of your debrillator.  This is not an appointment at the office.            Jan 22, 2018  2:00 PM CST   Ech Complete with [id number]   [facility] CV Echocardiography ([facility])    [address]  [address]  [place] [facility] [id number] [phone]           1. Please bring or wear a comfortable two-piece outfit. 2. You may eat, drink and take your normal medicines. 3. For any questions that cannot be answered, please contact the ordering physician            Apr 25, 2018  4:30 PM CDT   Remote ICD Check with BEATTY DCR2   [facility] ([facility])    [id number]  [facility] [id number] [phone]           This appointment is for a remote check of your debrillator.  This is not an appointment at the office.              Who to contact     If you have questions or need follow up information about today's clinic visit or your schedule please contact [facility] directly at [phone].  Normal or non-critical lab and imaging results will be communicated to you by MyChart, letter or phone within 4 business days after the clinic has  "received the results. If you do not hear from us within 7 days, please contact the clinic through [employer] or phone. If you have a critical or abnormal lab result, we will notify you by phone as soon as possible.  Submit refill requests through [employer] or call your pharmacy and they will forward the refill request to us. Please allow 3 business days for your refill to be completed.          Additional Information About Your Visit        [employer] Touch Information     [employer] lets you send messages to your doctor, view your test results, renew your prescriptions, schedule appointments and more. To sign up, go to [email or web address] . Click on \"Log in\" on the left side of the screen, which will take you to the Welcome page. Then click on \"Sign up Now\" on the right side of the page.     You will be asked to enter the access code listed below, as well as some personal information. Please follow the directions to create your username and password.     Your access code is: [email or web address]  Expires: 2018 10:33 AM     Your access code will  in 90 days. If you need help or a new code, please call your [place] clinic or [phone].        Care EveryWhere ID     This is your Care EveryWhere ID. This could be used by other organizations to access your [place] medical records  [id number]         Blood Pressure from Last 3 Encounters:   10/27/17 146/77   17 137/76    Weight from Last 3 Encounters:   10/26/17 117.1 kg (258 lb 1.6 oz)   17 118.8 kg (261 lb 12.8 oz)              We Performed the Following     ICD DEVICE INTERROGAT REMOTE (26593)     INTERROGATION DEVICE EVAL REMOTE, PACER/ICD (23286)        Primary Care Provider Office Phone # Fax #    Edwina Rodriguez [phone]       [facility] [address] [place] [address]        Equal Access to Services     ELI WOODS AH: Hadii eden pelletier Soomaali, waaxda luqadaha, qaybta kaalmada sathya, pati campos " mazinlunadee sandovalmaisha susijose lasaminadee ah. So [facility] [phone].    ATENCIÓN: Si janetla siomara, tiene a owens disposición servicios gratuitos de asistencia lingüística. Joe sebastian [phone].    We comply with applicable federal civil rights laws and [place] laws. We do not discriminate on the basis of race, color, national origin, age, disability, sex, sexual orientation, or gender identity.            Thank you!     Thank you for choosing [facility]  for your care. Our goal is always to provide you with excellent care. Hearing back from our patients is one way we can continue to improve our services. Please take a few minutes to complete the written survey that you may receive in the mail after your visit with us. Thank you!             Your Updated Medication List - Protect others around you: Learn how to safely use, store and throw away your medicines at www.disposemymeds.org.          This list is accurate as of: 1/18/18 10:33 AM.  Always use your most recent med list.                   Brand Name Dispense Instructions for use Diagnosis    ASPIR-81 PO      Take 1 tablet by mouth daily        atorvastatin 80 MG tablet    LIPITOR     Take 80 mg by mouth daily        IBUPROFEN PO      Take 200 mg by mouth every 6 hours as needed for moderate pain        LANTUS SOLOSTAR 100 UNIT/ML injection   Generic drug:  insulin glargine      Inject 58 Units Subcutaneous every morning        lisinopril 40 MG tablet    PRINIVIL/ZESTRIL     Take 40 mg by mouth daily        metFORMIN 500 MG 24 hr tablet    GLUCOPHAGE-XR     Take 500 mg by mouth daily (with dinner)        metoprolol succinate 100 MG 24 hr tablet    TOPROL-XL     Take 200 mg by mouth daily        * NEURONTIN PO      Take 600 mg by mouth every morning        * NEURONTIN PO      Take 900 mg by mouth every evening        nitroGLYcerin 0.4 MG sublingual tablet    NITROSTAT     Place 0.4 mg under the tongue every 5 minutes as needed for chest  pain For chest pain place 1 tablet under the tongue every 5 minutes for 3 doses. If symptoms persist 5 minutes after 1st dose call 911.        NOVOFINE 30G X 8 MM   Generic drug:  insulin pen needle      Use 5 pen needles daily or as directed.        NovoLOG FLEXPEN 100 UNIT/ML injection   Generic drug:  insulin aspart      Inject 26 Units Subcutaneous 3 times daily (with meals)        spironolactone 25 MG tablet    ALDACTONE     Take 50 mg by mouth daily        * Notice:  This list has 2 medication(s) that are the same as other medications prescribed for you. Read the directions carefully, and ask your doctor or other care provider to review them with you.

## 2018-01-29 ENCOUNTER — HOSPITAL ENCOUNTER (OUTPATIENT)
Dept: CARDIOLOGY | Facility: CLINIC | Age: 68
Discharge: HOME | End: 2018-01-29
Admitting: INTERNAL MEDICINE
Payer: MEDICARE

## 2018-01-29 DIAGNOSIS — I25.5 ISCHEMIC CARDIOMYOPATHY: ICD-10-CM

## 2018-01-29 PROCEDURE — 93306 TTE W/DOPPLER COMPLETE: CPT

## 2018-01-29 PROCEDURE — 25500064 ZZH RX 255 OP 636: Performed by: INTERNAL MEDICINE

## 2018-01-29 PROCEDURE — 93306 TTE W/DOPPLER COMPLETE: CPT | Mod: 26 | Performed by: INTERNAL MEDICINE

## 2018-01-29 RX ADMIN — SULFUR HEXAFLUORIDE 5 ML: KIT at 10:42

## 2018-02-14 DIAGNOSIS — I25.5 ISCHEMIC CARDIOMYOPATHY: Primary | ICD-10-CM

## 2018-02-14 DIAGNOSIS — I25.10 CAD (CORONARY ARTERY DISEASE): ICD-10-CM

## 2018-04-25 ENCOUNTER — ALLIED HEALTH/NURSE VISIT (OUTPATIENT)
Dept: CARDIOLOGY | Facility: CLINIC | Age: 68
End: 2018-04-25
Payer: COMMERCIAL

## 2018-04-25 DIAGNOSIS — I25.5 ISCHEMIC CARDIOMYOPATHY: Primary | ICD-10-CM

## 2018-04-25 DIAGNOSIS — Z95.810 ICD (IMPLANTABLE CARDIOVERTER-DEFIBRILLATOR) IN PLACE: ICD-10-CM

## 2018-04-25 PROCEDURE — 93296 REM INTERROG EVL PM/IDS: CPT | Performed by: INTERNAL MEDICINE

## 2018-04-25 PROCEDURE — 93295 DEV INTERROG REMOTE 1/2/MLT: CPT | Performed by: INTERNAL MEDICINE

## 2018-04-25 NOTE — MR AVS SNAPSHOT
After Visit Summary   4/25/2018    Go Saavedra    MRN: [id number]           Patient Information     Date Of Birth          1950        Visit Information        Provider Department      4/25/2018 4:30 PM BEATTY DCR2 [facility]        Today's Diagnoses     Ischemic cardiomyopathy    -  1    ICD (implantable cardioverter-defibrillator) in place           Follow-ups after your visit        Your next 10 appointments already scheduled     Apr 25, 2018  4:30 PM CDT   Remote ICD Check with BEATTY DCR2   [facility] ([facility])    [address] [address] [phone] OPT 2           This appointment is for a remote check of your debrillator.  This is not an appointment at the office.            Aug 13, 2018  4:30 PM CDT   Remote ICD Check with BEATTY DCR2   [facility] ([facility])    [id number]  [facility] [address] [phone] OPT 2           This appointment is for a remote check of your debrillator.  This is not an appointment at the office.              Who to contact     If you have questions or need follow up information about today's clinic visit or your schedule please contact [facility] directly at [phone].  Normal or non-critical lab and imaging results will be communicated to you by MyChart, letter or phone within 4 business days after the clinic has received the results. If you do not hear from us within 7 days, please contact the clinic through MyChart or phone. If you have a critical or abnormal lab result, we will notify you by phone as soon as possible.  Submit refill requests through Jammit or call your pharmacy and they will forward the refill request to us. Please allow 3 business days for your refill to be completed.          Additional Information About Your  "Visit        BakedCodehart Information     [employer] lets you send messages to your doctor, view your test results, renew your prescriptions, schedule appointments and more. To sign up, go to [email or web address] . Click on \"Log in\" on the left side of the screen, which will take you to the Welcome page. Then click on \"Sign up Now\" on the right side of the page.     You will be asked to enter the access code listed below, as well as some personal information. Please follow the directions to create your username and password.     Your access code is: [email or web address]  Expires: 2018  8:31 AM     Your access code will  in 90 days. If you need help or a new code, please call your [place] clinic or [phone].        Care EveryWhere ID     This is your Care EveryWhere ID. This could be used by other organizations to access your [facility] medical records  [id number]         Blood Pressure from Last 3 Encounters:   10/27/17 146/77   17 137/76    Weight from Last 3 Encounters:   10/26/17 117.1 kg (258 lb 1.6 oz)   17 118.8 kg (261 lb 12.8 oz)              We Performed the Following     ICD DEVICE INTERROGAT REMOTE (69545)     INTERROGATION DEVICE EVAL REMOTE, PACER/ICD (54315)        Primary Care Provider Office Phone # Fax #    Edwina Rodriguez [phone]       [facility] [address] [place] [address]        Equal Access to Services     [facility]: Hadii aad ku hadasho Soomaali, waaxda luqadaha, qaybta kaalmada adeegtanisha, pati rodarte . So [facility] [phone].    ATENCIÓN: Si habla español, tiene a owens disposición servicios gratuitos de asistencia lingüística. Llame al [phone].    We comply with applicable federal civil rights laws and [place] laws. We do not discriminate on the basis of race, color, national origin, age, disability, sex, sexual orientation, or gender identity.            Thank you!     Thank you for choosing " [facility]  for your care. Our goal is always to provide you with excellent care. Hearing back from our patients is one way we can continue to improve our services. Please take a few minutes to complete the written survey that you may receive in the mail after your visit with us. Thank you!             Your Updated Medication List - Protect others around you: Learn how to safely use, store and throw away your medicines at www.disposemymeds.org.          This list is accurate as of 4/25/18  8:31 AM.  Always use your most recent med list.                   Brand Name Dispense Instructions for use Diagnosis    ASPIR-81 PO      Take 1 tablet by mouth daily        atorvastatin 80 MG tablet    LIPITOR     Take 80 mg by mouth daily        IBUPROFEN PO      Take 200 mg by mouth every 6 hours as needed for moderate pain        LANTUS SOLOSTAR 100 UNIT/ML injection   Generic drug:  insulin glargine      Inject 58 Units Subcutaneous every morning        lisinopril 40 MG tablet    PRINIVIL/ZESTRIL     Take 40 mg by mouth daily        metFORMIN 500 MG 24 hr tablet    GLUCOPHAGE-XR     Take 500 mg by mouth daily (with dinner)        metoprolol succinate 100 MG 24 hr tablet    TOPROL-XL     Take 200 mg by mouth daily        * NEURONTIN PO      Take 600 mg by mouth every morning        * NEURONTIN PO      Take 900 mg by mouth every evening        nitroGLYcerin 0.4 MG sublingual tablet    NITROSTAT     Place 0.4 mg under the tongue every 5 minutes as needed for chest pain For chest pain place 1 tablet under the tongue every 5 minutes for 3 doses. If symptoms persist 5 minutes after 1st dose call 911.        NOVOFINE 30G X 8 MM   Generic drug:  insulin pen needle      Use 5 pen needles daily or as directed.        NovoLOG FLEXPEN 100 UNIT/ML injection   Generic drug:  insulin aspart      Inject 26 Units Subcutaneous 3 times daily (with meals)        spironolactone 25 MG tablet    ALDACTONE      Take 50 mg by mouth daily        * Notice:  This list has 2 medication(s) that are the same as other medications prescribed for you. Read the directions carefully, and ask your doctor or other care provider to review them with you.

## 2018-04-25 NOTE — PROGRESS NOTES
[employer] (D) ICD Remote Device Check  AP: 14 % : 0 %  Mode: DDD 60/130        Presenting Rhythm: Sinus with a first degree AV block and frequent PVC's   Heart Rate: Overall HR is stable with good variability  Sensing: WNL    Pacing Threshold: Not on auto capture    Impedance: WNL  Battery Status: Estimated at 2.5 years remaining longevity  Atrial Arrhythmia: 10 mode switches with 3 stored EGM's - all showing short bursts of noise on lead (not a new finding)  Ventricular Arrhythmia: 1 episode of VT lasting 12 beats at a rate of 192 BPM. 6 NSVT logged with 2 stored EGM's for review- both showing short bursts of 1:1 conducted PAT (no change in farfield morphology and the run was initiated by a PAC)  ATP: 0    Shocks: 0    Care Plan: Next remote in 3 months. MICKEY Villalobos

## 2018-08-13 ENCOUNTER — ALLIED HEALTH/NURSE VISIT (OUTPATIENT)
Dept: CARDIOLOGY | Facility: CLINIC | Age: 68
End: 2018-08-13
Payer: COMMERCIAL

## 2018-08-13 DIAGNOSIS — Z95.810 ICD (IMPLANTABLE CARDIOVERTER-DEFIBRILLATOR) IN PLACE: ICD-10-CM

## 2018-08-13 DIAGNOSIS — I25.5 ISCHEMIC CARDIOMYOPATHY: Primary | ICD-10-CM

## 2018-08-13 PROCEDURE — 93295 DEV INTERROG REMOTE 1/2/MLT: CPT | Performed by: INTERNAL MEDICINE

## 2018-08-13 PROCEDURE — 93296 REM INTERROG EVL PM/IDS: CPT | Performed by: INTERNAL MEDICINE

## 2018-08-13 NOTE — PROGRESS NOTES
[employer] (D) ICD Remote Device Check    AP: 12% : 0 %  Mode: DDD         Presenting Rhythm: AS/VS  Heart Rate: good variability   Sensing: WNL    Pacing Threshold: not obtained    Impedance: WNL  Battery Status: 2 years with an 11.2 second charge  Atrial Arrhythmia: 10 ATRs- all show brief periods of noise - not new  Ventricular Arrhythmia: 1 VF- initiated by a PAC, 1:1 conducted PAT duration about 4 seconds rates of 226. 1 VT 1- initiated by a PAC, 1:1 conducted PAT duration about 15 seconds rates in the 150s 4 NSVT- 2 EGMs initiated by a PAC, 1:1 conducted PAT, 1-4 seconds, rates 140s-160s  ATP: 0    Shocks: 0        Care Plan: Due for clinic device check, has orders for cardiology in February. Called and spoke with patient's wife - she doesn't think he has reported any fast heart rates, but she will ask him and if so he'll call us back. Order placed for follow up since she didn't know his schedule. SK

## 2018-08-13 NOTE — MR AVS SNAPSHOT
After Visit Summary   8/13/2018    Go Saavedra    MRN: [id number]           Patient Information     Date Of Birth          1950        Visit Information        Provider Department      8/13/2018 4:30 PM BEATTY DCR2 [facility]        Today's Diagnoses     Ischemic cardiomyopathy    -  1    ICD (implantable cardioverter-defibrillator) in place           Follow-ups after your visit        Additional Services     Follow-Up with Device Clinic                 Your next 10 appointments already scheduled     Aug 13, 2018  4:30 PM CDT   Remote ICD Check with BEATTY DCR2   [facility] ([facility])    [address] [place] [address] [address]  [facility] [address]   [phone] OPT 2           This appointment is for a remote check of your debrillator.  This is not an appointment at the office.              Future tests that were ordered for you today     Open Future Orders        Priority Expected Expires Ordered    Follow-Up with Device Clinic Routine 11/13/2018 8/12/2020 8/13/2018            Who to contact     If you have questions or need follow up information about today's clinic visit or your schedule please contact [facility] directly at [phone].  Normal or non-critical lab and imaging results will be communicated to you by MyChart, letter or phone within 4 business days after the clinic has received the results. If you do not hear from us within 7 days, please contact the clinic through Spreakerhart or phone. If you have a critical or abnormal lab result, we will notify you by phone as soon as possible.  Submit refill requests through ProfitBricks or call your pharmacy and they will forward the refill request to us. Please allow 3 business days for your refill to be completed.          Additional Information About Your Visit        MyChart Information     [employer] lets you send messages to your  "doctor, view your test results, renew your prescriptions, schedule appointments and more. To sign up, go to [email or web address] . Click on \"Log in\" on the left side of the screen, which will take you to the Welcome page. Then click on \"Sign up Now\" on the right side of the page.     You will be asked to enter the access code listed below, as well as some personal information. Please follow the directions to create your username and password.     Your access code is: [email or web address]  Expires: 2018  1:04 PM     Your access code will  in 90 days. If you need help or a new code, please call your [place] clinic or [phone].        Care EveryWhere ID     This is your Care EveryWhere ID. This could be used by other organizations to access your [place] medical records  [id number]         Blood Pressure from Last 3 Encounters:   10/27/17 146/77   17 137/76    Weight from Last 3 Encounters:   10/26/17 117.1 kg (258 lb 1.6 oz)   17 118.8 kg (261 lb 12.8 oz)              We Performed the Following     ICD DEVICE INTERROGAT REMOTE (40760)     INTERROGATION DEVICE EVAL REMOTE, PACER/ICD (98656)        Primary Care Provider Office Phone # Fax #    Edwina Rodriguez [phone]       [facility] [address] [place] [address]        Equal Access to Services     [facility]: Hadii aad ku hadasho Soomaali, waaxda luqadaha, qaybta kaalmada adeegyada, waxay idiin haylissette rodarte . So [facility] [phone].    ATENCIÓN: Si habla español, tiene a owens disposición servicios gratuitos de asistencia lingüística. Llame al [phone].    We comply with applicable federal civil rights laws and [place] laws. We do not discriminate on the basis of race, color, national origin, age, disability, sex, sexual orientation, or gender identity.            Thank you!     Thank you for choosing [facility]  for your care. Our goal " is always to provide you with excellent care. Hearing back from our patients is one way we can continue to improve our services. Please take a few minutes to complete the written survey that you may receive in the mail after your visit with us. Thank you!             Your Updated Medication List - Protect others around you: Learn how to safely use, store and throw away your medicines at www.disposemymeds.org.          This list is accurate as of 8/13/18  1:04 PM.  Always use your most recent med list.                   Brand Name Dispense Instructions for use Diagnosis    ASPIR-81 PO      Take 1 tablet by mouth daily        atorvastatin 80 MG tablet    LIPITOR     Take 80 mg by mouth daily        IBUPROFEN PO      Take 200 mg by mouth every 6 hours as needed for moderate pain        LANTUS SOLOSTAR 100 UNIT/ML injection   Generic drug:  insulin glargine      Inject 58 Units Subcutaneous every morning        lisinopril 40 MG tablet    PRINIVIL/ZESTRIL     Take 40 mg by mouth daily        metFORMIN 500 MG 24 hr tablet    GLUCOPHAGE-XR     Take 500 mg by mouth daily (with dinner)        metoprolol succinate 100 MG 24 hr tablet    TOPROL-XL     Take 200 mg by mouth daily        * NEURONTIN PO      Take 600 mg by mouth every morning        * NEURONTIN PO      Take 900 mg by mouth every evening        nitroGLYcerin 0.4 MG sublingual tablet    NITROSTAT     Place 0.4 mg under the tongue every 5 minutes as needed for chest pain For chest pain place 1 tablet under the tongue every 5 minutes for 3 doses. If symptoms persist 5 minutes after 1st dose call 911.        NOVOFINE 30G X 8 MM   Generic drug:  insulin pen needle      Use 5 pen needles daily or as directed.        NovoLOG FLEXPEN 100 UNIT/ML injection   Generic drug:  insulin aspart      Inject 26 Units Subcutaneous 3 times daily (with meals)        spironolactone 25 MG tablet    ALDACTONE     Take 50 mg by mouth daily        * Notice:  This list has 2 medication(s)  that are the same as other medications prescribed for you. Read the directions carefully, and ask your doctor or other care provider to review them with you.

## 2018-10-05 ENCOUNTER — APPOINTMENT (OUTPATIENT)
Dept: CT IMAGING | Facility: CLINIC | Age: 68
DRG: 853 | End: 2018-10-05
Attending: EMERGENCY MEDICINE
Payer: MEDICARE

## 2018-10-05 ENCOUNTER — HOSPITAL ENCOUNTER (INPATIENT)
Facility: CLINIC | Age: 68
LOS: 3 days | Discharge: HOME | DRG: 853 | End: 2018-10-08
Attending: EMERGENCY MEDICINE | Admitting: HOSPITALIST
Payer: MEDICARE

## 2018-10-05 DIAGNOSIS — E08.10 DIABETIC KETOACIDOSIS WITHOUT COMA ASSOCIATED WITH DIABETES MELLITUS DUE TO UNDERLYING CONDITION (H): ICD-10-CM

## 2018-10-05 DIAGNOSIS — G89.18 ACUTE POST-OPERATIVE PAIN: Primary | ICD-10-CM

## 2018-10-05 DIAGNOSIS — K85.10 ACUTE BILIARY PANCREATITIS, UNSPECIFIED COMPLICATION STATUS: ICD-10-CM

## 2018-10-05 DIAGNOSIS — A41.9 SEVERE SEPSIS (H): ICD-10-CM

## 2018-10-05 DIAGNOSIS — R65.20 SEVERE SEPSIS (H): ICD-10-CM

## 2018-10-05 DIAGNOSIS — K83.09 ASCENDING CHOLANGITIS (H): ICD-10-CM

## 2018-10-05 PROBLEM — E11.10 DKA (DIABETIC KETOACIDOSIS) (H): Status: ACTIVE | Noted: 2018-10-05

## 2018-10-05 LAB
ALBUMIN SERPL-MCNC: 3.2 G/DL (ref 3.4–5)
ALBUMIN SERPL-MCNC: 3.2 G/DL (ref 3.4–5)
ALBUMIN SERPL-MCNC: 3.9 G/DL (ref 3.4–5)
ALBUMIN UR-MCNC: NEGATIVE MG/DL
ALP SERPL-CCNC: 122 U/L (ref 40–150)
ALP SERPL-CCNC: 124 U/L (ref 40–150)
ALP SERPL-CCNC: 152 U/L (ref 40–150)
ALT SERPL W P-5'-P-CCNC: 347 U/L (ref 0–70)
ALT SERPL W P-5'-P-CCNC: 364 U/L (ref 0–70)
ALT SERPL W P-5'-P-CCNC: 428 U/L (ref 0–70)
ANION GAP SERPL CALCULATED.3IONS-SCNC: 16 MMOL/L (ref 3–14)
ANION GAP SERPL CALCULATED.3IONS-SCNC: 7 MMOL/L (ref 3–14)
ANION GAP SERPL CALCULATED.3IONS-SCNC: 8 MMOL/L (ref 3–14)
APPEARANCE UR: CLEAR
AST SERPL W P-5'-P-CCNC: 143 U/L (ref 0–45)
AST SERPL W P-5'-P-CCNC: 146 U/L (ref 0–45)
AST SERPL W P-5'-P-CCNC: 157 U/L (ref 0–45)
BASOPHILS # BLD AUTO: 0 10E9/L (ref 0–0.2)
BASOPHILS NFR BLD AUTO: 0.1 %
BILIRUB DIRECT SERPL-MCNC: 0.7 MG/DL (ref 0–0.2)
BILIRUB DIRECT SERPL-MCNC: 1.2 MG/DL (ref 0–0.2)
BILIRUB SERPL-MCNC: 1.8 MG/DL (ref 0.2–1.3)
BILIRUB SERPL-MCNC: 2.6 MG/DL (ref 0.2–1.3)
BILIRUB SERPL-MCNC: 4 MG/DL (ref 0.2–1.3)
BILIRUB UR QL STRIP: NEGATIVE
BUN SERPL-MCNC: 15 MG/DL (ref 7–30)
BUN SERPL-MCNC: 15 MG/DL (ref 7–30)
BUN SERPL-MCNC: 20 MG/DL (ref 7–30)
CALCIUM SERPL-MCNC: 8.4 MG/DL (ref 8.5–10.1)
CALCIUM SERPL-MCNC: 8.5 MG/DL (ref 8.5–10.1)
CALCIUM SERPL-MCNC: 9.8 MG/DL (ref 8.5–10.1)
CHLORIDE SERPL-SCNC: 106 MMOL/L (ref 94–109)
CHLORIDE SERPL-SCNC: 107 MMOL/L (ref 94–109)
CHLORIDE SERPL-SCNC: 99 MMOL/L (ref 94–109)
CO2 SERPL-SCNC: 21 MMOL/L (ref 20–32)
CO2 SERPL-SCNC: 23 MMOL/L (ref 20–32)
CO2 SERPL-SCNC: 26 MMOL/L (ref 20–32)
COLOR UR AUTO: YELLOW
CREAT SERPL-MCNC: 0.84 MG/DL (ref 0.66–1.25)
CREAT SERPL-MCNC: 0.85 MG/DL (ref 0.66–1.25)
CREAT SERPL-MCNC: 0.95 MG/DL (ref 0.66–1.25)
D DIMER PPP FEU-MCNC: 1.5 UG/ML FEU (ref 0–0.5)
DIFFERENTIAL METHOD BLD: ABNORMAL
EOSINOPHIL # BLD AUTO: 0 10E9/L (ref 0–0.7)
EOSINOPHIL NFR BLD AUTO: 0 %
ERYTHROCYTE [DISTWIDTH] IN BLOOD BY AUTOMATED COUNT: 13.4 % (ref 10–15)
ERYTHROCYTE [DISTWIDTH] IN BLOOD BY AUTOMATED COUNT: 13.5 % (ref 10–15)
GFR SERPL CREATININE-BSD FRML MDRD: 79 ML/MIN/1.7M2
GFR SERPL CREATININE-BSD FRML MDRD: >90 ML/MIN/1.7M2
GFR SERPL CREATININE-BSD FRML MDRD: >90 ML/MIN/1.7M2
GLUCOSE BLDC GLUCOMTR-MCNC: 177 MG/DL (ref 70–99)
GLUCOSE BLDC GLUCOMTR-MCNC: 184 MG/DL (ref 70–99)
GLUCOSE BLDC GLUCOMTR-MCNC: 184 MG/DL (ref 70–99)
GLUCOSE BLDC GLUCOMTR-MCNC: 215 MG/DL (ref 70–99)
GLUCOSE BLDC GLUCOMTR-MCNC: 224 MG/DL (ref 70–99)
GLUCOSE BLDC GLUCOMTR-MCNC: 227 MG/DL (ref 70–99)
GLUCOSE BLDC GLUCOMTR-MCNC: 230 MG/DL (ref 70–99)
GLUCOSE BLDC GLUCOMTR-MCNC: 279 MG/DL (ref 70–99)
GLUCOSE SERPL-MCNC: 193 MG/DL (ref 70–99)
GLUCOSE SERPL-MCNC: 268 MG/DL (ref 70–99)
GLUCOSE SERPL-MCNC: 360 MG/DL (ref 70–99)
GLUCOSE UR STRIP-MCNC: >1000 MG/DL
HBA1C MFR BLD: 7.5 % (ref 0–5.6)
HCT VFR BLD AUTO: 38.4 % (ref 40–53)
HCT VFR BLD AUTO: 43.8 % (ref 40–53)
HGB BLD-MCNC: 13.4 G/DL (ref 13.3–17.7)
HGB BLD-MCNC: 15.5 G/DL (ref 13.3–17.7)
HGB UR QL STRIP: NEGATIVE
IMM GRANULOCYTES # BLD: 0 10E9/L (ref 0–0.4)
IMM GRANULOCYTES NFR BLD: 0.3 %
INTERPRETATION ECG - MUSE: NORMAL
KETONES BLD-SCNC: 0.2 MMOL/L (ref 0–0.6)
KETONES BLD-SCNC: 0.3 MMOL/L (ref 0–0.6)
KETONES BLD-SCNC: 1.7 MMOL/L (ref 0–0.6)
KETONES UR STRIP-MCNC: 40 MG/DL
LACTATE BLD-SCNC: 2 MMOL/L (ref 0.7–2)
LACTATE BLD-SCNC: 4.4 MMOL/L (ref 0.7–2)
LEUKOCYTE ESTERASE UR QL STRIP: NEGATIVE
LIPASE SERPL-CCNC: 1072 U/L (ref 73–393)
LYMPHOCYTES # BLD AUTO: 0.9 10E9/L (ref 0.8–5.3)
LYMPHOCYTES NFR BLD AUTO: 6.1 %
MAGNESIUM SERPL-MCNC: 1.6 MG/DL (ref 1.6–2.3)
MCH RBC QN AUTO: 31.1 PG (ref 26.5–33)
MCH RBC QN AUTO: 31.6 PG (ref 26.5–33)
MCHC RBC AUTO-ENTMCNC: 34.9 G/DL (ref 31.5–36.5)
MCHC RBC AUTO-ENTMCNC: 35.4 G/DL (ref 31.5–36.5)
MCV RBC AUTO: 89 FL (ref 78–100)
MCV RBC AUTO: 89 FL (ref 78–100)
MONOCYTES # BLD AUTO: 0.9 10E9/L (ref 0–1.3)
MONOCYTES NFR BLD AUTO: 6 %
MUCOUS THREADS #/AREA URNS LPF: PRESENT /LPF
NEUTROPHILS # BLD AUTO: 12.4 10E9/L (ref 1.6–8.3)
NEUTROPHILS NFR BLD AUTO: 87.5 %
NITRATE UR QL: NEGATIVE
NRBC # BLD AUTO: 0 10*3/UL
NRBC BLD AUTO-RTO: 0 /100
OSMOLALITY SERPL: 295 MMOL/KG (ref 280–301)
PH UR STRIP: 5 PH (ref 5–7)
PHOSPHATE SERPL-MCNC: 1.7 MG/DL (ref 2.5–4.5)
PLATELET # BLD AUTO: 189 10E9/L (ref 150–450)
PLATELET # BLD AUTO: 248 10E9/L (ref 150–450)
POTASSIUM SERPL-SCNC: 4 MMOL/L (ref 3.4–5.3)
POTASSIUM SERPL-SCNC: 4.2 MMOL/L (ref 3.4–5.3)
POTASSIUM SERPL-SCNC: 4.3 MMOL/L (ref 3.4–5.3)
PROCALCITONIN SERPL-MCNC: 0.83 NG/ML
PROT SERPL-MCNC: 6.4 G/DL (ref 6.8–8.8)
PROT SERPL-MCNC: 6.4 G/DL (ref 6.8–8.8)
PROT SERPL-MCNC: 7.9 G/DL (ref 6.8–8.8)
RBC # BLD AUTO: 4.31 10E12/L (ref 4.4–5.9)
RBC # BLD AUTO: 4.9 10E12/L (ref 4.4–5.9)
RBC #/AREA URNS AUTO: 1 /HPF (ref 0–2)
SODIUM SERPL-SCNC: 136 MMOL/L (ref 133–144)
SODIUM SERPL-SCNC: 137 MMOL/L (ref 133–144)
SODIUM SERPL-SCNC: 140 MMOL/L (ref 133–144)
SOURCE: ABNORMAL
SP GR UR STRIP: 1.04 (ref 1–1.03)
TROPONIN I SERPL-MCNC: <0.015 UG/L (ref 0–0.04)
UROBILINOGEN UR STRIP-MCNC: NORMAL MG/DL (ref 0–2)
WBC # BLD AUTO: 14.2 10E9/L (ref 4–11)
WBC # BLD AUTO: 19.6 10E9/L (ref 4–11)
WBC #/AREA URNS AUTO: 1 /HPF (ref 0–5)

## 2018-10-05 PROCEDURE — 00000146 ZZHCL STATISTIC GLUCOSE BY METER IP

## 2018-10-05 PROCEDURE — 25000128 H RX IP 250 OP 636: Performed by: HOSPITALIST

## 2018-10-05 PROCEDURE — 25000128 H RX IP 250 OP 636: Performed by: EMERGENCY MEDICINE

## 2018-10-05 PROCEDURE — 25000132 ZZH RX MED GY IP 250 OP 250 PS 637: Mod: GY | Performed by: PHYSICIAN ASSISTANT

## 2018-10-05 PROCEDURE — 87040 BLOOD CULTURE FOR BACTERIA: CPT | Performed by: PHYSICIAN ASSISTANT

## 2018-10-05 PROCEDURE — 82010 KETONE BODYS QUAN: CPT | Performed by: PHYSICIAN ASSISTANT

## 2018-10-05 PROCEDURE — 25000131 ZZH RX MED GY IP 250 OP 636 PS 637: Mod: GY | Performed by: HOSPITALIST

## 2018-10-05 PROCEDURE — A9270 NON-COVERED ITEM OR SERVICE: HCPCS | Mod: GY | Performed by: PHYSICIAN ASSISTANT

## 2018-10-05 PROCEDURE — 83930 ASSAY OF BLOOD OSMOLALITY: CPT | Performed by: PHYSICIAN ASSISTANT

## 2018-10-05 PROCEDURE — 80048 BASIC METABOLIC PNL TOTAL CA: CPT | Performed by: PHYSICIAN ASSISTANT

## 2018-10-05 PROCEDURE — 25000131 ZZH RX MED GY IP 250 OP 636 PS 637: Mod: GY | Performed by: PHYSICIAN ASSISTANT

## 2018-10-05 PROCEDURE — 71260 CT THORAX DX C+: CPT

## 2018-10-05 PROCEDURE — 36415 COLL VENOUS BLD VENIPUNCTURE: CPT | Performed by: PHYSICIAN ASSISTANT

## 2018-10-05 PROCEDURE — 93005 ELECTROCARDIOGRAM TRACING: CPT

## 2018-10-05 PROCEDURE — 25000125 ZZHC RX 250: Performed by: PHYSICIAN ASSISTANT

## 2018-10-05 PROCEDURE — 96375 TX/PRO/DX INJ NEW DRUG ADDON: CPT

## 2018-10-05 PROCEDURE — 25000125 ZZHC RX 250: Performed by: EMERGENCY MEDICINE

## 2018-10-05 PROCEDURE — 99207 ZZC APP CREDIT; MD BILLING SHARED VISIT: CPT | Performed by: PHYSICIAN ASSISTANT

## 2018-10-05 PROCEDURE — 84100 ASSAY OF PHOSPHORUS: CPT | Performed by: PHYSICIAN ASSISTANT

## 2018-10-05 PROCEDURE — 83605 ASSAY OF LACTIC ACID: CPT | Performed by: EMERGENCY MEDICINE

## 2018-10-05 PROCEDURE — 83735 ASSAY OF MAGNESIUM: CPT | Performed by: PHYSICIAN ASSISTANT

## 2018-10-05 PROCEDURE — 25000131 ZZH RX MED GY IP 250 OP 636 PS 637: Mod: GY | Performed by: EMERGENCY MEDICINE

## 2018-10-05 PROCEDURE — 74177 CT ABD & PELVIS W/CONTRAST: CPT

## 2018-10-05 PROCEDURE — 84484 ASSAY OF TROPONIN QUANT: CPT | Performed by: EMERGENCY MEDICINE

## 2018-10-05 PROCEDURE — 40000884 ZZH STATISTIC STEP DOWN HRS NIGHT

## 2018-10-05 PROCEDURE — 25800025 ZZH RX 258: Performed by: PHYSICIAN ASSISTANT

## 2018-10-05 PROCEDURE — 96367 TX/PROPH/DG ADDL SEQ IV INF: CPT

## 2018-10-05 PROCEDURE — 83036 HEMOGLOBIN GLYCOSYLATED A1C: CPT | Performed by: PHYSICIAN ASSISTANT

## 2018-10-05 PROCEDURE — 40000885 ZZH STATISTIC STEP DOWN HRS EVENING

## 2018-10-05 PROCEDURE — 83690 ASSAY OF LIPASE: CPT | Performed by: EMERGENCY MEDICINE

## 2018-10-05 PROCEDURE — 87040 BLOOD CULTURE FOR BACTERIA: CPT | Performed by: EMERGENCY MEDICINE

## 2018-10-05 PROCEDURE — 99285 EMERGENCY DEPT VISIT HI MDM: CPT | Mod: 25

## 2018-10-05 PROCEDURE — 80053 COMPREHEN METABOLIC PANEL: CPT | Performed by: EMERGENCY MEDICINE

## 2018-10-05 PROCEDURE — 85379 FIBRIN DEGRADATION QUANT: CPT | Performed by: EMERGENCY MEDICINE

## 2018-10-05 PROCEDURE — 96365 THER/PROPH/DIAG IV INF INIT: CPT | Mod: 59

## 2018-10-05 PROCEDURE — 82010 KETONE BODYS QUAN: CPT | Performed by: EMERGENCY MEDICINE

## 2018-10-05 PROCEDURE — 85025 COMPLETE CBC W/AUTO DIFF WBC: CPT | Performed by: PHYSICIAN ASSISTANT

## 2018-10-05 PROCEDURE — 85027 COMPLETE CBC AUTOMATED: CPT | Performed by: EMERGENCY MEDICINE

## 2018-10-05 PROCEDURE — 21000000 ZZH R&B IMCU HEART CARE

## 2018-10-05 PROCEDURE — 96361 HYDRATE IV INFUSION ADD-ON: CPT

## 2018-10-05 PROCEDURE — 96376 TX/PRO/DX INJ SAME DRUG ADON: CPT

## 2018-10-05 PROCEDURE — 81001 URINALYSIS AUTO W/SCOPE: CPT | Performed by: EMERGENCY MEDICINE

## 2018-10-05 PROCEDURE — 25000128 H RX IP 250 OP 636: Performed by: PHYSICIAN ASSISTANT

## 2018-10-05 PROCEDURE — 80076 HEPATIC FUNCTION PANEL: CPT | Performed by: PHYSICIAN ASSISTANT

## 2018-10-05 PROCEDURE — 84145 PROCALCITONIN (PCT): CPT | Performed by: PHYSICIAN ASSISTANT

## 2018-10-05 PROCEDURE — 99223 1ST HOSP IP/OBS HIGH 75: CPT | Mod: AI | Performed by: HOSPITALIST

## 2018-10-05 RX ORDER — DEXTROSE MONOHYDRATE, SODIUM CHLORIDE, AND POTASSIUM CHLORIDE 50; 1.49; 4.5 G/1000ML; G/1000ML; G/1000ML
INJECTION, SOLUTION INTRAVENOUS CONTINUOUS
Status: DISCONTINUED | OUTPATIENT
Start: 2018-10-05 | End: 2018-10-05

## 2018-10-05 RX ORDER — NICOTINE POLACRILEX 4 MG
15-30 LOZENGE BUCCAL
Status: DISCONTINUED | OUTPATIENT
Start: 2018-10-05 | End: 2018-10-08 | Stop reason: HOSPADM

## 2018-10-05 RX ORDER — LIDOCAINE 40 MG/G
CREAM TOPICAL
Status: CANCELLED | OUTPATIENT
Start: 2018-10-05

## 2018-10-05 RX ORDER — NICOTINE POLACRILEX 4 MG
15-30 LOZENGE BUCCAL
Status: DISCONTINUED | OUTPATIENT
Start: 2018-10-05 | End: 2018-10-05

## 2018-10-05 RX ORDER — HYDROMORPHONE HYDROCHLORIDE 1 MG/ML
.3-.5 INJECTION, SOLUTION INTRAMUSCULAR; INTRAVENOUS; SUBCUTANEOUS
Status: DISCONTINUED | OUTPATIENT
Start: 2018-10-05 | End: 2018-10-08 | Stop reason: HOSPADM

## 2018-10-05 RX ORDER — MAGNESIUM SULFATE HEPTAHYDRATE 40 MG/ML
4 INJECTION, SOLUTION INTRAVENOUS EVERY 4 HOURS PRN
Status: DISCONTINUED | OUTPATIENT
Start: 2018-10-05 | End: 2018-10-08 | Stop reason: HOSPADM

## 2018-10-05 RX ORDER — IOPAMIDOL 755 MG/ML
131 INJECTION, SOLUTION INTRAVASCULAR ONCE
Status: DISCONTINUED | OUTPATIENT
Start: 2018-10-05 | End: 2018-10-05

## 2018-10-05 RX ORDER — LIDOCAINE 40 MG/G
CREAM TOPICAL
Status: DISCONTINUED | OUTPATIENT
Start: 2018-10-05 | End: 2018-10-07

## 2018-10-05 RX ORDER — NITROGLYCERIN 0.4 MG/1
0.4 TABLET SUBLINGUAL EVERY 5 MIN PRN
Status: DISCONTINUED | OUTPATIENT
Start: 2018-10-05 | End: 2018-10-07

## 2018-10-05 RX ORDER — ONDANSETRON 2 MG/ML
4 INJECTION INTRAMUSCULAR; INTRAVENOUS ONCE
Status: COMPLETED | OUTPATIENT
Start: 2018-10-05 | End: 2018-10-05

## 2018-10-05 RX ORDER — GABAPENTIN 300 MG/1
900 CAPSULE ORAL EVERY EVENING
Status: DISCONTINUED | OUTPATIENT
Start: 2018-10-05 | End: 2018-10-08 | Stop reason: HOSPADM

## 2018-10-05 RX ORDER — SODIUM CHLORIDE AND POTASSIUM CHLORIDE 150; 450 MG/100ML; MG/100ML
INJECTION, SOLUTION INTRAVENOUS CONTINUOUS
Status: DISCONTINUED | OUTPATIENT
Start: 2018-10-05 | End: 2018-10-05

## 2018-10-05 RX ORDER — DEXTROSE MONOHYDRATE 25 G/50ML
25-50 INJECTION, SOLUTION INTRAVENOUS
Status: DISCONTINUED | OUTPATIENT
Start: 2018-10-05 | End: 2018-10-08 | Stop reason: HOSPADM

## 2018-10-05 RX ORDER — HYDROMORPHONE HYDROCHLORIDE 1 MG/ML
0.5 INJECTION, SOLUTION INTRAMUSCULAR; INTRAVENOUS; SUBCUTANEOUS
Status: DISCONTINUED | OUTPATIENT
Start: 2018-10-05 | End: 2018-10-05

## 2018-10-05 RX ORDER — PROCHLORPERAZINE 25 MG
12.5 SUPPOSITORY, RECTAL RECTAL EVERY 12 HOURS PRN
Status: DISCONTINUED | OUTPATIENT
Start: 2018-10-05 | End: 2018-10-08 | Stop reason: HOSPADM

## 2018-10-05 RX ORDER — METOCLOPRAMIDE HYDROCHLORIDE 5 MG/ML
5 INJECTION INTRAMUSCULAR; INTRAVENOUS EVERY 6 HOURS PRN
Status: DISCONTINUED | OUTPATIENT
Start: 2018-10-05 | End: 2018-10-08 | Stop reason: HOSPADM

## 2018-10-05 RX ORDER — ONDANSETRON 2 MG/ML
4 INJECTION INTRAMUSCULAR; INTRAVENOUS EVERY 6 HOURS PRN
Status: DISCONTINUED | OUTPATIENT
Start: 2018-10-05 | End: 2018-10-08 | Stop reason: HOSPADM

## 2018-10-05 RX ORDER — PIPERACILLIN SODIUM, TAZOBACTAM SODIUM 3; .375 G/15ML; G/15ML
3.38 INJECTION, POWDER, LYOPHILIZED, FOR SOLUTION INTRAVENOUS EVERY 6 HOURS
Status: DISCONTINUED | OUTPATIENT
Start: 2018-10-05 | End: 2018-10-08

## 2018-10-05 RX ORDER — PIPERACILLIN SODIUM, TAZOBACTAM SODIUM 4; .5 G/20ML; G/20ML
4.5 INJECTION, POWDER, LYOPHILIZED, FOR SOLUTION INTRAVENOUS ONCE
Status: COMPLETED | OUTPATIENT
Start: 2018-10-05 | End: 2018-10-05

## 2018-10-05 RX ORDER — NALOXONE HYDROCHLORIDE 0.4 MG/ML
.1-.4 INJECTION, SOLUTION INTRAMUSCULAR; INTRAVENOUS; SUBCUTANEOUS
Status: DISCONTINUED | OUTPATIENT
Start: 2018-10-05 | End: 2018-10-08 | Stop reason: HOSPADM

## 2018-10-05 RX ORDER — ONDANSETRON 4 MG/1
4 TABLET, ORALLY DISINTEGRATING ORAL EVERY 6 HOURS PRN
Status: DISCONTINUED | OUTPATIENT
Start: 2018-10-05 | End: 2018-10-08 | Stop reason: HOSPADM

## 2018-10-05 RX ORDER — METOCLOPRAMIDE 5 MG/1
5 TABLET ORAL EVERY 6 HOURS PRN
Status: DISCONTINUED | OUTPATIENT
Start: 2018-10-05 | End: 2018-10-08 | Stop reason: HOSPADM

## 2018-10-05 RX ORDER — DEXTROSE MONOHYDRATE 25 G/50ML
25-50 INJECTION, SOLUTION INTRAVENOUS
Status: DISCONTINUED | OUTPATIENT
Start: 2018-10-05 | End: 2018-10-05

## 2018-10-05 RX ORDER — METOPROLOL SUCCINATE 100 MG/1
200 TABLET, EXTENDED RELEASE ORAL DAILY
Status: DISCONTINUED | OUTPATIENT
Start: 2018-10-05 | End: 2018-10-08 | Stop reason: HOSPADM

## 2018-10-05 RX ORDER — PROCHLORPERAZINE MALEATE 5 MG
5 TABLET ORAL EVERY 6 HOURS PRN
Status: DISCONTINUED | OUTPATIENT
Start: 2018-10-05 | End: 2018-10-08 | Stop reason: HOSPADM

## 2018-10-05 RX ORDER — SODIUM CHLORIDE 9 MG/ML
INJECTION, SOLUTION INTRAVENOUS CONTINUOUS
Status: DISCONTINUED | OUTPATIENT
Start: 2018-10-05 | End: 2018-10-08 | Stop reason: HOSPADM

## 2018-10-05 RX ORDER — GABAPENTIN 300 MG/1
600 CAPSULE ORAL EVERY MORNING
Status: DISCONTINUED | OUTPATIENT
Start: 2018-10-06 | End: 2018-10-08 | Stop reason: HOSPADM

## 2018-10-05 RX ORDER — IOPAMIDOL 755 MG/ML
131 INJECTION, SOLUTION INTRAVASCULAR ONCE
Status: COMPLETED | OUTPATIENT
Start: 2018-10-05 | End: 2018-10-05

## 2018-10-05 RX ADMIN — SODIUM CHLORIDE 5.5 UNITS/HR: 9 INJECTION, SOLUTION INTRAVENOUS at 20:15

## 2018-10-05 RX ADMIN — Medication 0.5 MG: at 20:30

## 2018-10-05 RX ADMIN — SODIUM CHLORIDE: 9 INJECTION, SOLUTION INTRAVENOUS at 22:45

## 2018-10-05 RX ADMIN — SODIUM CHLORIDE 1000 ML: 9 INJECTION, SOLUTION INTRAVENOUS at 13:08

## 2018-10-05 RX ADMIN — SODIUM CHLORIDE 1000 ML: 9 INJECTION, SOLUTION INTRAVENOUS at 13:09

## 2018-10-05 RX ADMIN — Medication 0.5 MG: at 22:55

## 2018-10-05 RX ADMIN — POTASSIUM CHLORIDE, DEXTROSE MONOHYDRATE AND SODIUM CHLORIDE: 150; 5; 450 INJECTION, SOLUTION INTRAVENOUS at 18:25

## 2018-10-05 RX ADMIN — PIPERACILLIN SODIUM,TAZOBACTAM SODIUM 4.5 G: 4; .5 INJECTION, POWDER, FOR SOLUTION INTRAVENOUS at 13:40

## 2018-10-05 RX ADMIN — SODIUM CHLORIDE 1000 ML: 9 INJECTION, SOLUTION INTRAVENOUS at 12:23

## 2018-10-05 RX ADMIN — INSULIN GLARGINE 25 UNITS: 100 INJECTION, SOLUTION SUBCUTANEOUS at 18:47

## 2018-10-05 RX ADMIN — METOPROLOL SUCCINATE 200 MG: 100 TABLET, EXTENDED RELEASE ORAL at 17:34

## 2018-10-05 RX ADMIN — INSULIN ASPART 2 UNITS: 100 INJECTION, SOLUTION INTRAVENOUS; SUBCUTANEOUS at 22:46

## 2018-10-05 RX ADMIN — POTASSIUM PHOSPHATE, MONOBASIC AND POTASSIUM PHOSPHATE, DIBASIC 20 MMOL: 224; 236 INJECTION, SOLUTION INTRAVENOUS at 18:47

## 2018-10-05 RX ADMIN — PIPERACILLIN SODIUM,TAZOBACTAM SODIUM 3.38 G: 3; .375 INJECTION, POWDER, FOR SOLUTION INTRAVENOUS at 21:12

## 2018-10-05 RX ADMIN — Medication 0.5 MG: at 17:34

## 2018-10-05 RX ADMIN — POTASSIUM CHLORIDE AND SODIUM CHLORIDE: 450; 150 INJECTION, SOLUTION INTRAVENOUS at 16:21

## 2018-10-05 RX ADMIN — SODIUM CHLORIDE, PRESERVATIVE FREE 102 ML: 5 INJECTION INTRAVENOUS at 13:41

## 2018-10-05 RX ADMIN — SODIUM CHLORIDE 500 ML: 9 INJECTION, SOLUTION INTRAVENOUS at 14:17

## 2018-10-05 RX ADMIN — ONDANSETRON 4 MG: 2 INJECTION INTRAMUSCULAR; INTRAVENOUS at 12:18

## 2018-10-05 RX ADMIN — Medication 0.5 MG: at 12:20

## 2018-10-05 RX ADMIN — SODIUM CHLORIDE 3.5 UNITS/HR: 9 INJECTION, SOLUTION INTRAVENOUS at 14:12

## 2018-10-05 RX ADMIN — Medication 0.5 MG: at 13:50

## 2018-10-05 RX ADMIN — IOPAMIDOL 131 ML: 755 INJECTION, SOLUTION INTRAVENOUS at 13:41

## 2018-10-05 ASSESSMENT — ENCOUNTER SYMPTOMS
DIZZINESS: 1
FEVER: 0
NAUSEA: 1
VOMITING: 1
ABDOMINAL PAIN: 1
SHORTNESS OF BREATH: 0
DIARRHEA: 1

## 2018-10-05 ASSESSMENT — PAIN DESCRIPTION - DESCRIPTORS
DESCRIPTORS: SHARP;CRAMPING
DESCRIPTORS: ACHING
DESCRIPTORS: SHARP;CRAMPING
DESCRIPTORS: ACHING
DESCRIPTORS: ACHING

## 2018-10-05 NOTE — PHARMACY-ADMISSION MEDICATION HISTORY
Admission medication history interview status for the 10/5/2018  admission is complete. See EPIC admission navigator for prior to admission medications     Medication history source reliability:Good    Actions taken by pharmacist (provider contacted, etc):None     Additional medication history information not noted on PTA med list :None    Medication reconciliation/reorder completed by provider prior to medication history? No    Time spent in this activity: 10 min    Prior to Admission medications    Medication Sig Last Dose Taking? Auth Provider   Aspirin (ASPIR-81 PO) Take 81 mg by mouth daily  10/4/2018 at Unknown time Yes Reported, Patient   atorvastatin (LIPITOR) 80 MG tablet Take 80 mg by mouth daily 10/4/2018 at Unknown time Yes Reported, Patient   Gabapentin (NEURONTIN PO) Take 600 mg by mouth every morning 10/4/2018 at Unknown time Yes Unknown, Entered By History   Gabapentin (NEURONTIN PO) Take 900 mg by mouth every evening 10/4/2018 at Unknown time Yes Unknown, Entered By History   IBUPROFEN PO Take 200 mg by mouth every 6 hours as needed for moderate pain  Yes Reported, Patient   insulin aspart (NOVOLOG FLEXPEN) 100 UNIT/ML injection Inject 26 Units Subcutaneous 3 times daily (with meals) 10/4/2018 at Unknown time Yes Reported, Patient   insulin glargine (LANTUS SOLOSTAR) 100 UNIT/ML injection Inject 58 Units Subcutaneous every morning  10/4/2018 at Unknown time Yes Reported, Patient   lisinopril (PRINIVIL/ZESTRIL) 40 MG tablet Take 40 mg by mouth daily 10/4/2018 at Unknown time Yes Reported, Patient   metFORMIN (GLUCOPHAGE-XR) 500 MG 24 hr tablet Take 500 mg by mouth daily (with dinner) 10/4/2018 at Unknown time Yes Reported, Patient   metoprolol (TOPROL-XL) 100 MG 24 hr tablet Take 200 mg by mouth daily 10/4/2018 at Unknown time Yes Reported, Patient   nitroGLYcerin (NITROSTAT) 0.4 MG sublingual tablet Place 0.4 mg under the tongue every 5 minutes as needed for chest pain For chest pain place 1 tablet  under the tongue every 5 minutes for 3 doses. If symptoms persist 5 minutes after 1st dose call 911.  Yes Reported, Patient   spironolactone (ALDACTONE) 25 MG tablet Take 75 mg by mouth daily  10/4/2018 at Unknown time Yes Reported, Patient   insulin pen needle (NOVOFINE) 30G X 8 MM Use 5 pen needles daily or as directed.   Reported, Patient

## 2018-10-05 NOTE — PROGRESS NOTES
Hospitalist admission note dictated. Confirmation #: [id number].    JoAnna Barthell, PA-C  10/5/2018   4:11 PM

## 2018-10-05 NOTE — ED NOTES
[facility]  ED Nurse Handoff Report    ED Chief complaint: Chest Pain (Pain across lower chest, upper abd started Wednesday night, several bouts of emesis since then. Hx of CABG, PPM. )      ED Diagnosis:   Final diagnoses:   Severe sepsis (H)   Diabetic ketoacidosis without coma associated with diabetes mellitus due to underlying condition (H)   Ascending cholangitis   Acute biliary pancreatitis, unspecified complication status       Code Status: Full Code    Allergies: No Known Allergies    Activity level - Baseline/Home:  Independent    Activity Level - Current:   Independent     Needed?: No    Isolation: No  Infection: Not Applicable  Bariatric?: No    Vital Signs:   Vitals:    10/05/18 1346 10/05/18 1347 10/05/18 1352 10/05/18 1415   BP: (!) 172/93  (!) 171/127    Resp:  24 25 12   Temp:       TempSrc:       SpO2:  95% 95% 97%   Weight:           Cardiac Rhythm: ,        Pain level: 0-10 Pain Scale: 3    Is this patient confused?: No   [place] - Suicide Severity Rating Scale Completed?  Yes  If yes, what color did the patient score?  White    Patient Report:   68 year old male who presents with abdominal pain. Patient reports abdomen pain that started 2 nights ago after golfing and eating buffalo wings for dinner. He has had multiple episodes of emesis since. The abdominal pain a slightly worse on the right than left. He has some dizziness. He did not eat at all yesterday and he did not take his insulin yesterday or today. He denies any chest pain, but states his abdominal pain radiates up to his chest.    Focused Assessment:   Alert and oriented times 3  Lung sounds clear kelly.  Complaining of right sided chest pain right abd pain   VSS   Sinus tachycardia   Has not voided in ER yet  Pt has received 3500 ml NS    Tests Performed:   Labs  EKG  CT    Abnormal Results:   Lactic acid 4.4 redrawn done at 1420   Ketones 1.7  D dimer 1.5  Lipase elevated    Treatments provided:   NS 3500  ml  Dilaudid times 2  Zofran   Zoysn    Family Comments:   Wife at bedside    OBS brochure/video discussed/provided to patient/family: No              Name of person given brochure if not patient:               Relationship to patient    ED Medications:   Medications   HYDROmorphone (PF) (DILAUDID) injection 0.5 mg (0.5 mg Intravenous Given 10/5/18 1350)   0.9% sodium chloride BOLUS (500 mLs Intravenous New Bag 10/5/18 1417)   insulin 1 unit/1 mL in NS (NovoLIN, HumuLIN Regular) drip -ED DKA algorithm (3.5 Units/hr Intravenous New Bag 10/5/18 1412)   iopamidol (ISOVUE-370) solution 131 mL (not administered)   sodium chloride 0.9 % bag 500mL for CT scan flush use (not administered)   ondansetron (ZOFRAN) injection 4 mg (4 mg Intravenous Given 10/5/18 1218)   iohexol (OMNIPAQUE) solution 50 mL (50 mLs Oral Given 10/5/18 1224)   0.9% sodium chloride BOLUS (0 mLs Intravenous Stopped 10/5/18 1413)   0.9% sodium chloride BOLUS (0 mLs Intravenous Stopped 10/5/18 1308)   0.9% sodium chloride BOLUS (0 mLs Intravenous Stopped 10/5/18 1417)   piperacillin-tazobactam (ZOSYN) 4.5 g vial to attach to  mL bag (0 g Intravenous Stopped 10/5/18 1414)   iopamidol (ISOVUE-370) solution 131 mL (131 mLs Intravenous Given 10/5/18 1341)   sodium chloride 0.9 % bag 500mL for CT scan flush use (102 mLs Intravenous Given 10/5/18 1341)       Drips infusing?:  Yes    For the majority of the shift this patient was Green.   Interventions performed were none.    Severe Sepsis OR Septic Shock Diagnosis Present:   Yes    Per the ED Provider, Time Zero for severe sepsis or septic shock is:  1215    3 Hour Severe Sepsis Bundle Completion:  1. Initial Lactic Acid Result:   Recent Labs   Lab Test  10/05/18   1410  10/05/18   1215   LACT  2.0  4.4*     2. Blood Cultures before Antibiotics: Yes  3. Broad Spectrum Antibiotics Administered:     Anti-infectives     None        4. 3500 ml of IV fluids have been given so far      6 Hour Severe Sepsis  Bundle Completion:    1. Repeat Lactic Acid Level: 2.0  2. Patient currently on Vasopressors =  No    To be done/followed up on inpatient unit:      ED NURSE PHONE NUMBER:   [phone]

## 2018-10-05 NOTE — ED PROVIDER NOTES
History     Chief Complaint:  Abdominal Pain    HPI   Go Saavedra is a 68 year old male who presents with abdominal pain. Patient reports abdomen pain that started 2 nights ago after golfing and eating [employer] for dinner. He has had multiple episodes of emesis since. He denies a history abdominal surgeries. The abdominal pain a slightly worse on the right than left. He has some dizziness. He did not eat at all yesterday and he did not take his insulin yesterday or today. He denies any chest pain, but states his abdominal pain radiates up to his chest some. He denies any leg pain or swelling, fevers, and trouble breathing. He has some diarrhea yesterday but not today. He denies any recent traveling.     Allergies:  No known drug allergies.    Medications:    Aspirin (aspir-81 po)  Atorvastatin (lipitor) 80 mg tablet  Gabapentin (neurontin po)  Ibuprofen po  Insulin aspart (novolog flexpen) 100 unit/ml injection  Insulin glargine (lantus solostar) 100 unit/ml injection  Insulin pen needle (novofine) 30g x 8 mm  Lisinopril (prinivil/zestril) 40 mg tablet  Metformin (glucophage-xr) 500 mg 24 hr tablet  Metoprolol (toprol-xl) 100 mg 24 hr tablet  Nitroglycerin (nitrostat) 0.4 mg sublingual tablet  Spironolactone (aldactone) 25 mg tablet     Past Medical History:    Coronary artery disease   Diabetes mellitus   History of coronary artery bypass graft x 3   Hyperlipidemia   Ischemic cardiomyopathy   MI (myocardial infarction)   Ventricular tachycardia   Cardiomyopathy    Past Surgical History:    Bypass graft artery coronary  Left heart catheterization  Implant cardioverter defibrillator    Family History:    Cancer    Social History:  Marital Status:   Tobacco Use: No  Alcohol Use: No  PCP: Edwina Rodriguez     Review of Systems   Constitutional: Negative for fever.   Respiratory: Negative for shortness of breath.    Cardiovascular: Negative for chest pain and leg swelling.   Gastrointestinal: Positive  for abdominal pain, diarrhea, nausea and vomiting.   Neurological: Positive for dizziness.   All other systems reviewed and are negative.    Physical Exam   First Vitals:  Patient Vitals for the past 24 hrs:   BP Temp Temp src Heart Rate Resp SpO2 Weight   10/05/18 1352 (!) 171/127 - - 130 25 95 % -   10/05/18 1347 - - - 131 24 95 % -   10/05/18 1346 (!) 172/93 - - - - - -   10/05/18 1315 - - - 100 16 96 % -   10/05/18 1303 188/85 - - 105 17 96 % -   10/05/18 1230 - - - 104 11 95 % -   10/05/18 1225 - - - 108 12 100 % -   10/05/18 1222 143/71 - - - - - -   10/05/18 1220 157/70 - - 111 18 - -   10/05/18 1200 (!) 162/109 - - 117 16 97 % -   10/05/18 1146 161/89 - - 115 18 95 % -   10/05/18 1136 (!) 191/108 - - - - 96 % -   10/05/18 1128 (!) 185/93 98.1  F (36.7  C) Temporal 120 18 98 % 117.9 kg (260 lb)       Physical Exam  Nursing note and vitals reviewed.  Constitutional:  Appears well-developed and well-nourished.   HENT:   Head:    Atraumatic.   Mouth/Throat:   Oropharynx is clear and dry mucous membranes. No oropharyngeal exudate.   Eyes:    Pupils are equal, round, and reactive to light.   Neck:    Normal range of motion. Neck supple.      No tracheal deviation present. No thyromegaly present.   Cardiovascular:  Mildly tachycardic rate, regular rhythm, no murmur   Pulmonary/Chest: Breath sounds are clear and equal without wheezes or crackles.  Abdominal:   Diffuse abdominal tenderness with guarding but not rebound.  Mildly distended.  Bowel sounds present, but hypoactive.  NO mass or hernia.  Musculoskeletal:  Exhibits no edema.   Lymphadenopathy:  No cervical adenopathy.   Neurological:   Alert and oriented to person, place, and time.   Skin:    Skin is warm and dry. No rash noted. No pallor.       Emergency Department Course   ECG:  @ 1122  Indication: Medical evaluation  Vent. Rate 124 bpm. NV interval 146 ms. QRS duration 96 ms. QT/QTc 304/436 ms. P-R-T axis 40 25 131.   Sinus tachycardia. ST & T wave  abnormality, consider lateral ischemia. Abnormal ECG.     Read @ 1134 by Dr. Casanova.    Imaging:  Radiographic findings were communicated with the patient who voiced understanding of the findings.  CT chest/abdomen/pelvis w contrast:  1. No evidence for pulmonary embolism.  2. Subtle induration of the peripancreatic fat could represent mild  pancreatitis and clinical correlation is recommended.  3. No other significant abnormality. Per Radiology read.    Laboratory:  Ketone Beta-Hydroxybutyrate Quantitative: 1.7 (HH)  Lactic Acid: 4.4 (HH)  Troponin: <0.015  D-dimer: 1.5 high  CBC:  WBC 19.6 high, HGB 15.5,   CMP: Glucose 360 high, Anion Gap 16 high,  high,  high, Alkphos 152 high, Bilirubin Total 4.0 high, o/w WNL. (Creatinine 0.95)  Lipase: 1072 high  Blood Cultures x2: Pending    Interventions:  1218: Zofran 4mg IV  1220: Dilaudid 0.5mg IV  1223: NS 1L IV  1224: Omnipaque 50mL PO  1308: NS 1L IV  1309: NS 1L IV  1340: Zosyn 4.5g IV  1341: Isovue 131mL IV  1350: Dilaudid 0.5mg IV   1412: Novolin, Humulin drip 3.5units/hour      Emergency Department Course:  Nursing notes and vitals reviewed.  I performed an exam of the patient as documented above.   Findings and plan explained to the patient who consents to admission.   2:18 PM I discussed the patient with Dr. Garcia of the hospitalist service, who will admit the patient to a [facility] [place] for further monitoring, evaluation, and treatment.      Impression & Plan      CMS Diagnoses:   The patient has signs of Septic Shock as evidenced by:    1. Presence of Sepsis, AND  2. Lactic Acid level greater than or equal to 4    Time septic shock diagnosis confirmed = 12:51 as this was the time when Lactate was resulted and the level was greater than or equal to 4      3 Hour Septic Shock Bundle Completion:  1. Initial Lactic Acid Result:   Recent Labs   Lab Test  10/05/18   1215   LACT  4.4*     2. Blood Cultures before Antibiotics: Yes  3. Broad  Spectrum Antibiotics Administered: Yes     Anti-infectives     None        4. 3500 ml of IV fluids.  Ideal body weight: 68.4 kg (150 lb 12.7 oz)  Adjusted ideal body weight: 88.2 kg (194 lb 7.6 oz)    Severe Sepsis reassessment:  1. Repeat Lactic Acid Level: Pending      Medical Decision Making:  I found the patient to have acute pancreatitis with resulting diabetic ketoacidosis. Due to the signs of severe sepsis with an elevated lactic acid, elevated white blood cell count, abdominal tenderness, and tachycardia he was given zosyn for empiric antibiotic treatment, aggressive IV fluid hydration, and placed on an IV drip and admitted to the [facility] under the care of the internal medicine hospitalist Dr. Garcia. There was no sign of pulmonary embolism, acute myocardial infarction, or pneumonia.       Critical Care time:  was 40 minutes for this patient excluding procedures.    Diagnosis:    ICD-10-CM    1. Severe sepsis (H) A41.9     R65.20    2. Diabetic ketoacidosis without coma associated with diabetes mellitus due to underlying condition (H) E08.10    3. Acute biliary pancreatitis, unspecified complication status K85.10        Disposition:  Admitted to the hospitalist  I, Bradley Aasen, am serving as a scribe on 10/5/2018 at 11:41 AM to personally document services performed by Obdulia Casanova MD based on my observations and the provider's statements to me.        Obdulia Casanova MD  10/05/18 1519       Obdulia Casanova MD  10/05/18 1520

## 2018-10-05 NOTE — IP AVS SNAPSHOT
[facility]    [address] [place] [address]    Phone:  [phone]                                       After Visit Summary   10/5/2018    Go Saavedra    MRN: [id number]           After Visit Summary Signature Page     I have received my discharge instructions, and my questions have been answered. I have discussed any challenges I see with this plan with the nurse or doctor.    ..........................................................................................................................................  Patient/Patient Representative Signature      ..........................................................................................................................................  Patient Representative Print Name and Relationship to Patient    ..................................................               ................................................  Date                                   Time    ..........................................................................................................................................  Reviewed by Signature/Title    ...................................................              ..............................................  Date                                               Time          22EPIC Rev 08/18

## 2018-10-05 NOTE — H&P
Physician Attestation   I, James Garcia, saw and evaluated Go Saavedra as part of a shared visit.  I have reviewed and discussed with the advanced practice provider their history, physical and plan.    I personally reviewed the vital signs, medications, labs and imaging.    My key history or physical exam findings:  Abdominal pain, nausea, vomiting, and jaundice that started on Wednesday  Holding his home lantus because of the vomiting last 36 hours  Exam notable for RUQ tenderness, afebrile, tachycardic    Key management decisions made by me:    1. Gallstone pancreatitis  Rule out cholangitis, but doubt  GI consulted in the EGD  Plan  - abx for now, continue zosyn, check PCT  - GI consult for consideration of EUS or ERCP  - trend lts  - AUS to better delineate biliary anatomy  - pain control and IVF     2. DKA  Secondary to insulin deficiency and possibly infection/pancreatitis  Admit to stepdown for labs/continuation of the DKA   - insulin infusion  - q2 hours labs  - lantus 25 units once gap closes, discharge infusion 2 hours later    3. History of CAD, s/p CABG  - hold antihypertensives    4. Diabetes type 2 with neuropathy  - will be on the insulin infusion      James Garcia  Date of Service (when I saw the patient): 10/05/18

## 2018-10-05 NOTE — IP AVS SNAPSHOT
MRN:[id number]                      After Visit Summary   10/5/2018    Go Saavedra    MRN: [id number]           Thank you!     Thank you for choosing [facility] for your care. Our goal is always to provide you with excellent care. Hearing back from our patients is one way we can continue to improve our services. Please take a few minutes to complete the written survey that you may receive in the mail after you visit with us. Thank you!        Patient Information     Date Of Birth          1950        Designated Caregiver       Most Recent Value    Caregiver    Will someone help with your care after discharge? yes    Name of designated caregiver [facility]    Phone number of caregiver [phone]    Caregiver address [place]      About your hospital stay     You were admitted on:  October 5, 2018 You last received care in the:  [place] [address] [facility]    You were discharged on:  October 8, 2018        Reason for your hospital stay       You had stone in bile duct that needed to be removed and needed gallbladder surgery                  Who to Call     For medical emergencies, please call 911.  For non-urgent questions about your medical care, please call your primary care provider or clinic, [phone]  For questions related to your surgery, please call your surgery clinic        Attending Provider     Provider Specialty    Obdulia Casanova MD Emergency Medicine    [facility]  [facility]       Primary Care Provider Office Phone # Fax #    Edwina Rodriguez [phone]      After Care Instructions     Activity       Your activity upon discharge: activity as tolerated            Diet       Follow this diet upon discharge: Orders Placed This Encounter      Advance Diet as Tolerated: Regular Diet Adult; 8328-2094 Calories: Moderate Consistent CHO (4-6 CHO units/meal)                  Follow-up Appointments     Follow-up and recommended labs  and tests        See discharge instruction sheet.            Follow-up and recommended labs and tests        Follow up with primary care provider, Edwina Rodriguez, within 7 days for hospital follow- up of cholangitis/choledocholithiasis.  The following labs/tests are recommended: WBC.                  Further instructions from your care team       [facility]  Discharge Instructions: Post-Operative Laparoscopic Cholecystectomy    ACTIVITY    Expect to feel tired after your surgery.  This will gradually resolve.      Take frequent, short walks and increase your activity gradually.      Avoid strenuous physical activity or heavy lifting greater than 15-20 lbs. for 2-3 weeks.  You may climb stairs.    You may drive without restrictions when you are not using any prescription pain medication and feel comfortable in a car.    You may return to work/school when you are comfortable without any prescription pain medication.    WOUND CARE    You may remove your outer dressing or Band-Aids and shower 48 hours after the surgery.  Pat your incisions dry and leave them open to air.  Re-apply dressing (Band-Aids or gauze/tape) as needed for comfort or drainage.    You may have steri-strips (looks like white tape) on your incision.  You may peel off the steri-strips 2 weeks after your surgery if they have not peeled off on their own.     Do not soak your incisions in a tub or pool for 2 weeks.     Do not apply any lotions, creams, or ointments to your incisions.    A ridge under your incisions is normal and will gradually resolve.    DIET    Start with liquids, then gradually resume your regular diet as tolerated.  Avoid heavy, spicy, and greasy meals for 2-3 days.    Drink plenty of fluids to stay hydrated.    It is not uncommon to experience some loose stools or diarrhea after surgery.  This is your body s way of adapting to the bile which will slowly drain into your intestine.  A low fat  diet may help with this.  This should improve over 1-2 months.    PAIN    Expect some tenderness and discomfort at the incision sites.  Use the prescribed pain medication at your discretion.  Expect gradual resolution of your pain over several days.    You may take ibuprofen with food (unless you have been told not to) instead of or in addition to your prescribed pain medication.  If you are taking Norco or Percocet, do not take any additional acetaminophen/APAP/Tylenol.    Do not drink alcohol or drive while you are taking pain medications.    You may apply ice to your incisions in 20 minute intervals as needed for the next 48 hours.  After that time, consider switching to heat if you prefer.    EXPECTATIONS    Pain medications can cause constipation.  Limit use when possible.  Take over the counter stool softener/stimulant, such as Colace or Senna, 1-2 times a day with plenty of water.  You may take a mild over the counter laxative, such as Miralax or a suppository, as needed.  You may discontinue these medications once you are having regular bowel movements and/or are no longer taking your narcotic pain medication.      You may have shoulder or upper back discomfort due to the gas used in surgery.  This is temporary and should resolve in 48-72 hours.  Short, frequent walks may help with this.    FOLLOW UP    Our office will contact you approximately 2 weeks to check on your progress and answer any questions you may have.  If you are doing well, you will not need to return for a follow up appointment.  If any concerns are identified over the phone, we will help you make an appointment to see a provider.     If you have not received a phone call, have any questions or concerns, or would like to be seen, please call us at [phone] and ask to speak with our nurse.  We are located at [address] [place] [address].    CALL OUR OFFICE [phone] IF YOU HAVE:     Chills or fever above  "101 F.    Increased redness, warmth, or drainage at your incisions.    Significant bleeding.    Pain not relieved by your pain medication or rest.    Increasing pain after the first 48 hours.    Any other concerns or questions.    Revised 2018    Pending Results     Date and Time Order Name Status Description    10/7/2018 1511 Surgical pathology exam In process     10/5/2018 1531 Blood culture Preliminary     10/5/2018 1302 Blood culture Preliminary     10/5/2018 1302 Blood culture Preliminary             Statement of Approval     Ordered          10/08/18 1131  I have reviewed and agree with all the recommendations and orders detailed in this document.  EFFECTIVE NOW     Approved and electronically signed by:  Otoniel Pandya MD             Admission Information     Date & Time Provider Department Dept. Phone    10/5/2018 James Garcia,  [place] [address] [facility] [phone]      Your Vitals Were     Blood Pressure Pulse Temperature Respirations Height Weight    166/88 79 98.2  F (36.8  C) (Oral) 16 1.727 m (5' 8\") 117 kg (257 lb 15 oz)    Pulse Oximetry BMI (Body Mass Index)                94% 39.22 kg/m2          [employer] Information     "[employer] lets you send messages to your doctor, view your test results, renew your prescriptions, schedule appointments and more. To sign up, go to [email or web address]" . Click on \"Log in\" on the left side of the screen, which will take you to the Welcome page. Then click on \"Sign up Now\" on the right side of the page.     You will be asked to enter the access code listed below, as well as some personal information. Please follow the directions to create your username and password.     Your access code is: [email or web address]  Expires: 2018  1:04 PM     Your access code will  in 90 days. If you need help or a new code, please call your [facility] or [phone].        Care EveryWhere ID     This is your Care EveryWhere ID. This " could be used by other organizations to access your [facility] medical records  [id number]        Equal Access to Services     ZACKDONN CHUCK : Hadii aad ku hadlashellaneudy Aragon, waorada emmashahrzadha, qaybta kadaltonda alekerrylori, pati goldmanjenalvino valentin. So [facility] [phone].    ATENCIÓN: Si habla español, tiene a owens disposición servicios gratuitos de asistencia lingüística. Llame al [phone].    We comply with applicable federal civil rights laws and [place] laws. We do not discriminate on the basis of race, color, national origin, age, disability, sex, sexual orientation, or gender identity.               Review of your medicines      START taking        Dose / Directions    amoxicillin-clavulanate 875-125 MG per tablet   Commonly known as:  AUGMENTIN   Used for:  Ascending cholangitis        Dose:  1 tablet   Take 1 tablet by mouth 2 times daily for 3 days   Quantity:  6 tablet   Refills:  0       oxyCODONE IR 5 MG tablet   Commonly known as:  ROXICODONE   Used for:  Acute post-operative pain        Dose:  5-10 mg   Take 1-2 tablets (5-10 mg) by mouth every 4 hours as needed for moderate to severe pain   Quantity:  20 tablet   Refills:  0         CONTINUE these medicines which may have CHANGED, or have new prescriptions. If we are uncertain of the size of tablets/capsules you have at home, strength may be listed as something that might have changed.        Dose / Directions    LANTUS SOLOSTAR 100 UNIT/ML injection   This may have changed:  how much to take   Generic drug:  insulin glargine        Dose:  30 Units   Inject 30 Units Subcutaneous every morning   Refills:  0         CONTINUE these medicines which have NOT CHANGED        Dose / Directions    ASPIR-81 PO   Notes to Patient:  Not given during hospitalization-ok to resume at home        Dose:  81 mg   Take 81 mg by mouth daily   Refills:  0       atorvastatin 80 MG tablet   Commonly known as:  LIPITOR   Notes to Patient:  Not given during  hospitalization-ok to resume at home        Dose:  80 mg   Take 80 mg by mouth daily   Refills:  0       lisinopril 40 MG tablet   Commonly known as:  PRINIVIL/ZESTRIL   Notes to Patient:  Not given during hospitalization-ok to resume at home        Dose:  40 mg   Take 40 mg by mouth daily   Refills:  0       metFORMIN 500 MG 24 hr tablet   Commonly known as:  GLUCOPHAGE-XR   Notes to Patient:  Not given during hospitalization-ok to resume at home        Dose:  500 mg   Take 500 mg by mouth daily (with dinner)   Refills:  0       metoprolol succinate 100 MG 24 hr tablet   Commonly known as:  TOPROL-XL        Dose:  200 mg   Take 200 mg by mouth daily   Refills:  0       * NEURONTIN PO        Dose:  600 mg   Take 600 mg by mouth every morning   Refills:  0       * NEURONTIN PO        Dose:  900 mg   Take 900 mg by mouth every evening   Refills:  0       nitroGLYcerin 0.4 MG sublingual tablet   Commonly known as:  NITROSTAT        Dose:  0.4 mg   Place 0.4 mg under the tongue every 5 minutes as needed for chest pain For chest pain place 1 tablet under the tongue every 5 minutes for 3 doses. If symptoms persist 5 minutes after 1st dose call 911.   Refills:  0       NOVOFINE 30G X 8 MM   Generic drug:  insulin pen needle        Use 5 pen needles daily or as directed.   Refills:  0       NovoLOG FLEXPEN 100 UNIT/ML injection   Generic drug:  insulin aspart        Dose:  26 Units   Inject 26 Units Subcutaneous 3 times daily (with meals)   Refills:  0       spironolactone 25 MG tablet   Commonly known as:  ALDACTONE   Notes to Patient:  Not given during hospitalization-ok to resume at home        Dose:  75 mg   Take 75 mg by mouth daily   Refills:  0       * Notice:  This list has 2 medication(s) that are the same as other medications prescribed for you. Read the directions carefully, and ask your doctor or other care provider to review them with you.      STOP taking     IBUPROFEN PO                Where to get your  medicines      These medications were sent to [facility] [place], [place] - [address]  [address], [place] [address]     Phone:  [phone]     amoxicillin-clavulanate 875-125 MG per tablet         Some of these will need a paper prescription and others can be bought over the counter. Ask your nurse if you have questions.     Bring a paper prescription for each of these medications     oxyCODONE IR 5 MG tablet                Protect others around you: Learn how to safely use, store and throw away your medicines at www.disposemymeds.org.        ANTIBIOTIC INSTRUCTION     You've Been Prescribed an Antibiotic - Now What?  Your healthcare team thinks that you or your loved one might have an infection. Some infections can be treated with antibiotics, which are powerful, life-saving drugs. Like all medications, antibiotics have side effects and should only be used when necessary. There are some important things you should know about your antibiotic treatment.      Your healthcare team may run tests before you start taking an antibiotic.    Your team may take samples (e.g., from your blood, urine or other areas) to run tests to look for bacteria. These test can be important to determine if you need an antibiotic at all and, if you do, which antibiotic will work best.      Within a few days, your healthcare team might change or even stop your antibiotic.    Your team may start you on an antibiotic while they are working to find out what is making you sick.    Your team might change your antibiotic because test results show that a different antibiotic would be better to treat your infection.    In some cases, once your team has more information, they learn that you do not need an antibiotic at all. They may find out that you don't have an infection, or that the antibiotic you're taking won't work against your infection. For example, an infection caused by a virus can't be treated with  antibiotics. Staying on an antibiotic when you don't need it is more likely to be harmful than helpful.      You may experience side effects from your antibiotic.    Like all medications, antibiotics have side effects. Some of these can be serious.    Let you healthcare team know if you have any known allergies when you are admitted to the hospital.    One significant side effect of nearly all antibiotics is the risk of severe and sometimes deadly diarrhea caused by Clostridium difficile (C. Difficile). This occurs when a person takes antibiotics because some good germs are destroyed. Antibiotic use allows C. diificile to take over, putting patients at high risk for this serious infection.    As a patient or caregiver, it is important to understand your or your loved one's antibiotic treatment. It is especially important for caregivers to speak up when patients can't speak for themselves. Here are some important questions to ask your healthcare team.    What infection is this antibiotic treating and how do you know I have that infection?    What side effects might occur from this antibiotic?    How long will I need to take this antibiotic?    Is it safe to take this antibiotic with other medications or supplements (e.g., vitamins) that I am taking?     Are there any special directions I need to know about taking this antibiotic? For example, should I take it with food?    How will I be monitored to know whether my infection is responding to the antibiotic?    What tests may help to make sure the right antibiotic is prescribed for me?      Information provided by:  www.cdc.gov/getsmart  U.S. Department of Health and Human Services  Centers for disease Control and Prevention  National Center for Emerging and Zoonotic Infectious Diseases  Division of Healthcare Quality Promotion        Information about OPIOIDS     PRESCRIPTION OPIOIDS: WHAT YOU NEED TO KNOW   We gave you an opioid (narcotic) pain medicine. It is  important to manage your pain, but opioids are not always the best choice. You should first try all the other options your care team gave you. Take this medicine for as short a time (and as few doses) as possible.    Some activities can increase your pain, such as bandage changes or therapy sessions. It may help to take your pain medicine 30 to 60 minutes before these activities. Reduce your stress by getting enough sleep, working on hobbies you enjoy and practicing relaxation or meditation. Talk to your care team about ways to manage your pain beyond prescription opioids.    These medicines have risks:    DO NOT drive when on new or higher doses of pain medicine. These medicines can affect your alertness and reaction times, and you could be arrested for driving under the influence (DUI). If you need to use opioids long-term, talk to your care team about driving.    DO NOT operate heavy machinery    DO NOT do any other dangerous activities while taking these medicines.    DO NOT drink any alcohol while taking these medicines.     If the opioid prescribed includes acetaminophen, DO NOT take with any other medicines that contain acetaminophen. Read all labels carefully. Look for the word  acetaminophen  or  Tylenol.  Ask your pharmacist if you have questions or are unsure.    You can get addicted to pain medicines, especially if you have a history of addiction (chemical, alcohol or substance dependence). Talk to your care team about ways to reduce this risk.    All opioids tend to cause constipation. Drink plenty of water and eat foods that have a lot of fiber, such as fruits, vegetables, prune juice, apple juice and high-fiber cereal. Take a laxative (Miralax, milk of magnesia, Colace, Senna) if you don t move your bowels at least every other day. Other side effects include upset stomach, sleepiness, dizziness, throwing up, tolerance (needing more of the medicine to have the same effect), physical dependence and  slowed breathing.    Store your pills in a secure place, locked if possible. We will not replace any lost or stolen medicine. If you don t finish your medicine, please throw away (dispose) as directed by your pharmacist. The [place] Pollution Control Agency has more information about safe disposal: https://www.pca.[facility].[place].us/living-green/managing-unwanted-medications             Medication List: This is a list of all your medications and when to take them. Check marks below indicate your daily home schedule. Keep this list as a reference.      Medications           Morning Afternoon Evening Bedtime As Needed    amoxicillin-clavulanate 875-125 MG per tablet   Commonly known as:  AUGMENTIN   Take 1 tablet by mouth 2 times daily for 3 days   Next Dose Due:  10-8 evening                                        ASPIR-81 PO   Take 81 mg by mouth daily   Next Dose Due:  10-9 AM   Notes to Patient:  Not given during hospitalization-ok to resume at home                                   atorvastatin 80 MG tablet   Commonly known as:  LIPITOR   Take 80 mg by mouth daily   Next Dose Due:  10-9 AM   Notes to Patient:  Not given during hospitalization-ok to resume at home                                   LANTUS SOLOSTAR 100 UNIT/ML injection   Inject 30 Units Subcutaneous every morning   Last time this was given:  25 Units on 10/7/2018 10:47 PM   Generic drug:  insulin glargine   Next Dose Due:  10-9 AM                                     lisinopril 40 MG tablet   Commonly known as:  PRINIVIL/ZESTRIL   Take 40 mg by mouth daily   Notes to Patient:  Not given during hospitalization-ok to resume at home                                   metFORMIN 500 MG 24 hr tablet   Commonly known as:  GLUCOPHAGE-XR   Take 500 mg by mouth daily (with dinner)   Next Dose Due:  10-8 evening   Notes to Patient:  Not given during hospitalization-ok to resume at home                                   metoprolol succinate 100 MG 24 hr tablet    Commonly known as:  TOPROL-XL   Take 200 mg by mouth daily   Last time this was given:  200 mg on 10/8/2018  8:16 AM   Next Dose Due:  10-9 AM                                   * NEURONTIN PO   Take 600 mg by mouth every morning   Last time this was given:  600 mg on 10/8/2018  8:16 AM   Next Dose Due:  10-9 AM                                   * NEURONTIN PO   Take 900 mg by mouth every evening   Last time this was given:  600 mg on 10/8/2018  8:16 AM   Next Dose Due:  10-8 PM                                   nitroGLYcerin 0.4 MG sublingual tablet   Commonly known as:  NITROSTAT   Place 0.4 mg under the tongue every 5 minutes as needed for chest pain For chest pain place 1 tablet under the tongue every 5 minutes for 3 doses. If symptoms persist 5 minutes after 1st dose call 911.                                   NOVOFINE 30G X 8 MM   Use 5 pen needles daily or as directed.   Generic drug:  insulin pen needle                                NovoLOG FLEXPEN 100 UNIT/ML injection   Inject 26 Units Subcutaneous 3 times daily (with meals)   Last time this was given:  1 Units on 10/8/2018  8:34 AM   Generic drug:  insulin aspart                                oxyCODONE IR 5 MG tablet   Commonly known as:  ROXICODONE   Take 1-2 tablets (5-10 mg) by mouth every 4 hours as needed for moderate to severe pain   Last time this was given:  5 mg on 10/8/2018  8:33 AM   Next Dose Due:  Can have anytime                                   spironolactone 25 MG tablet   Commonly known as:  ALDACTONE   Take 75 mg by mouth daily   Next Dose Due:  10-9 AM   Notes to Patient:  Not given during hospitalization-ok to resume at home                                   * Notice:  This list has 2 medication(s) that are the same as other medications prescribed for you. Read the directions carefully, and ask your doctor or other care provider to review them with you.

## 2018-10-05 NOTE — PLAN OF CARE
Problem: Patient Care Overview  Goal: Plan of Care/Patient Progress Review  Outcome: Improving  Patient came to floor with insulin infusion for DKA, BGMs in 200 range.  ST, BP stable.   A/O informed on plan of care.  Wife present.  Labs drawn and awaiting results.  Continue to monitor.

## 2018-10-06 ENCOUNTER — ANESTHESIA EVENT (OUTPATIENT)
Dept: SURGERY | Facility: CLINIC | Age: 68
DRG: 853 | End: 2018-10-06
Payer: MEDICARE

## 2018-10-06 ENCOUNTER — APPOINTMENT (OUTPATIENT)
Dept: ULTRASOUND IMAGING | Facility: CLINIC | Age: 68
DRG: 853 | End: 2018-10-06
Attending: HOSPITALIST
Payer: MEDICARE

## 2018-10-06 ENCOUNTER — APPOINTMENT (OUTPATIENT)
Dept: GENERAL RADIOLOGY | Facility: CLINIC | Age: 68
DRG: 853 | End: 2018-10-06
Attending: HOSPITALIST
Payer: MEDICARE

## 2018-10-06 ENCOUNTER — ANESTHESIA (OUTPATIENT)
Dept: SURGERY | Facility: CLINIC | Age: 68
DRG: 853 | End: 2018-10-06
Payer: MEDICARE

## 2018-10-06 LAB
ALBUMIN SERPL-MCNC: 2.9 G/DL (ref 3.4–5)
ALP SERPL-CCNC: 103 U/L (ref 40–150)
ALT SERPL W P-5'-P-CCNC: 267 U/L (ref 0–70)
ANION GAP SERPL CALCULATED.3IONS-SCNC: 6 MMOL/L (ref 3–14)
ANION GAP SERPL CALCULATED.3IONS-SCNC: 8 MMOL/L (ref 3–14)
ANION GAP SERPL CALCULATED.3IONS-SCNC: 8 MMOL/L (ref 3–14)
AST SERPL W P-5'-P-CCNC: 117 U/L (ref 0–45)
BILIRUB DIRECT SERPL-MCNC: 0.5 MG/DL (ref 0–0.2)
BILIRUB SERPL-MCNC: 1.5 MG/DL (ref 0.2–1.3)
BUN SERPL-MCNC: 13 MG/DL (ref 7–30)
BUN SERPL-MCNC: 14 MG/DL (ref 7–30)
BUN SERPL-MCNC: 14 MG/DL (ref 7–30)
CALCIUM SERPL-MCNC: 8 MG/DL (ref 8.5–10.1)
CALCIUM SERPL-MCNC: 8 MG/DL (ref 8.5–10.1)
CALCIUM SERPL-MCNC: 8.1 MG/DL (ref 8.5–10.1)
CHLORIDE SERPL-SCNC: 105 MMOL/L (ref 94–109)
CHLORIDE SERPL-SCNC: 106 MMOL/L (ref 94–109)
CHLORIDE SERPL-SCNC: 107 MMOL/L (ref 94–109)
CO2 SERPL-SCNC: 22 MMOL/L (ref 20–32)
CO2 SERPL-SCNC: 23 MMOL/L (ref 20–32)
CO2 SERPL-SCNC: 26 MMOL/L (ref 20–32)
CREAT SERPL-MCNC: 0.71 MG/DL (ref 0.66–1.25)
CREAT SERPL-MCNC: 0.74 MG/DL (ref 0.66–1.25)
CREAT SERPL-MCNC: 0.78 MG/DL (ref 0.66–1.25)
ERCP: NORMAL
ERYTHROCYTE [DISTWIDTH] IN BLOOD BY AUTOMATED COUNT: 13.7 % (ref 10–15)
GFR SERPL CREATININE-BSD FRML MDRD: >90 ML/MIN/1.7M2
GLUCOSE BLDC GLUCOMTR-MCNC: 169 MG/DL (ref 70–99)
GLUCOSE BLDC GLUCOMTR-MCNC: 170 MG/DL (ref 70–99)
GLUCOSE BLDC GLUCOMTR-MCNC: 179 MG/DL (ref 70–99)
GLUCOSE BLDC GLUCOMTR-MCNC: 190 MG/DL (ref 70–99)
GLUCOSE BLDC GLUCOMTR-MCNC: 218 MG/DL (ref 70–99)
GLUCOSE SERPL-MCNC: 184 MG/DL (ref 70–99)
GLUCOSE SERPL-MCNC: 187 MG/DL (ref 70–99)
GLUCOSE SERPL-MCNC: 195 MG/DL (ref 70–99)
HCT VFR BLD AUTO: 37.5 % (ref 40–53)
HGB BLD-MCNC: 12.6 G/DL (ref 13.3–17.7)
KETONES BLD-SCNC: 0.2 MMOL/L (ref 0–0.6)
KETONES BLD-SCNC: 0.3 MMOL/L (ref 0–0.6)
KETONES BLD-SCNC: 0.3 MMOL/L (ref 0–0.6)
KETONES BLD-SCNC: 0.4 MMOL/L (ref 0–0.6)
LACTATE BLD-SCNC: 0.8 MMOL/L (ref 0.7–2)
LIPASE SERPL-CCNC: 190 U/L (ref 73–393)
MCH RBC QN AUTO: 30.4 PG (ref 26.5–33)
MCHC RBC AUTO-ENTMCNC: 33.6 G/DL (ref 31.5–36.5)
MCV RBC AUTO: 91 FL (ref 78–100)
PHOSPHATE SERPL-MCNC: 1.6 MG/DL (ref 2.5–4.5)
PHOSPHATE SERPL-MCNC: 2.3 MG/DL (ref 2.5–4.5)
PHOSPHATE SERPL-MCNC: 2.4 MG/DL (ref 2.5–4.5)
PLATELET # BLD AUTO: 157 10E9/L (ref 150–450)
PLATELET # BLD AUTO: 164 10E9/L (ref 150–450)
POTASSIUM SERPL-SCNC: 4.7 MMOL/L (ref 3.4–5.3)
POTASSIUM SERPL-SCNC: 4.7 MMOL/L (ref 3.4–5.3)
POTASSIUM SERPL-SCNC: 4.9 MMOL/L (ref 3.4–5.3)
PROT SERPL-MCNC: 6.2 G/DL (ref 6.8–8.8)
RBC # BLD AUTO: 4.14 10E12/L (ref 4.4–5.9)
SODIUM SERPL-SCNC: 136 MMOL/L (ref 133–144)
SODIUM SERPL-SCNC: 137 MMOL/L (ref 133–144)
SODIUM SERPL-SCNC: 138 MMOL/L (ref 133–144)
WBC # BLD AUTO: 16.3 10E9/L (ref 4–11)

## 2018-10-06 PROCEDURE — 82010 KETONE BODYS QUAN: CPT | Performed by: PHYSICIAN ASSISTANT

## 2018-10-06 PROCEDURE — 36415 COLL VENOUS BLD VENIPUNCTURE: CPT | Performed by: INTERNAL MEDICINE

## 2018-10-06 PROCEDURE — 37000009 ZZH ANESTHESIA TECHNICAL FEE, EACH ADDTL 15 MIN: Performed by: INTERNAL MEDICINE

## 2018-10-06 PROCEDURE — 76705 ECHO EXAM OF ABDOMEN: CPT

## 2018-10-06 PROCEDURE — 25000132 ZZH RX MED GY IP 250 OP 250 PS 637: Mod: GY | Performed by: PHYSICIAN ASSISTANT

## 2018-10-06 PROCEDURE — 80048 BASIC METABOLIC PNL TOTAL CA: CPT | Performed by: PHYSICIAN ASSISTANT

## 2018-10-06 PROCEDURE — 99233 SBSQ HOSP IP/OBS HIGH 50: CPT | Performed by: STUDENT IN AN ORGANIZED HEALTH CARE EDUCATION/TRAINING PROGRAM

## 2018-10-06 PROCEDURE — 84100 ASSAY OF PHOSPHORUS: CPT | Performed by: PHYSICIAN ASSISTANT

## 2018-10-06 PROCEDURE — C1887 CATHETER, GUIDING: HCPCS | Performed by: INTERNAL MEDICINE

## 2018-10-06 PROCEDURE — 25000132 ZZH RX MED GY IP 250 OP 250 PS 637: Mod: GY | Performed by: HOSPITALIST

## 2018-10-06 PROCEDURE — 82010 KETONE BODYS QUAN: CPT | Performed by: HOSPITALIST

## 2018-10-06 PROCEDURE — 36415 COLL VENOUS BLD VENIPUNCTURE: CPT | Performed by: HOSPITALIST

## 2018-10-06 PROCEDURE — 25000128 H RX IP 250 OP 636: Performed by: HOSPITALIST

## 2018-10-06 PROCEDURE — 25000125 ZZHC RX 250: Performed by: PHYSICIAN ASSISTANT

## 2018-10-06 PROCEDURE — 40000277 XR SURGERY CARM FLUORO LESS THAN 5 MIN W STILLS

## 2018-10-06 PROCEDURE — 25000128 H RX IP 250 OP 636: Performed by: PHYSICIAN ASSISTANT

## 2018-10-06 PROCEDURE — 25000566 ZZH SEVOFLURANE, EA 15 MIN: Performed by: INTERNAL MEDICINE

## 2018-10-06 PROCEDURE — 71000012 ZZH RECOVERY PHASE 1 LEVEL 1 FIRST HR: Performed by: INTERNAL MEDICINE

## 2018-10-06 PROCEDURE — 25000128 H RX IP 250 OP 636: Performed by: NURSE ANESTHETIST, CERTIFIED REGISTERED

## 2018-10-06 PROCEDURE — 0FC98ZZ EXTIRPATION OF MATTER FROM COMMON BILE DUCT, VIA NATURAL OR ARTIFICIAL OPENING ENDOSCOPIC: ICD-10-PCS | Performed by: INTERNAL MEDICINE

## 2018-10-06 PROCEDURE — 84100 ASSAY OF PHOSPHORUS: CPT | Performed by: HOSPITALIST

## 2018-10-06 PROCEDURE — 85049 AUTOMATED PLATELET COUNT: CPT | Performed by: INTERNAL MEDICINE

## 2018-10-06 PROCEDURE — 36000058 ZZH SURGERY LEVEL 3 EA 15 ADDTL MIN: Performed by: INTERNAL MEDICINE

## 2018-10-06 PROCEDURE — 83690 ASSAY OF LIPASE: CPT | Performed by: PHYSICIAN ASSISTANT

## 2018-10-06 PROCEDURE — 25000131 ZZH RX MED GY IP 250 OP 636 PS 637: Mod: GY | Performed by: STUDENT IN AN ORGANIZED HEALTH CARE EDUCATION/TRAINING PROGRAM

## 2018-10-06 PROCEDURE — 27210286 ZZH BALLOON ADDITIONAL: Performed by: INTERNAL MEDICINE

## 2018-10-06 PROCEDURE — 36415 COLL VENOUS BLD VENIPUNCTURE: CPT | Performed by: PHYSICIAN ASSISTANT

## 2018-10-06 PROCEDURE — 80076 HEPATIC FUNCTION PANEL: CPT | Performed by: HOSPITALIST

## 2018-10-06 PROCEDURE — 36000060 ZZH SURGERY LEVEL 3 W FLUORO 1ST 30 MIN: Performed by: INTERNAL MEDICINE

## 2018-10-06 PROCEDURE — 25000128 H RX IP 250 OP 636: Performed by: ANESTHESIOLOGY

## 2018-10-06 PROCEDURE — 85027 COMPLETE CBC AUTOMATED: CPT | Performed by: PHYSICIAN ASSISTANT

## 2018-10-06 PROCEDURE — A9270 NON-COVERED ITEM OR SERVICE: HCPCS | Mod: GY | Performed by: PHYSICIAN ASSISTANT

## 2018-10-06 PROCEDURE — 25000125 ZZHC RX 250: Performed by: NURSE ANESTHETIST, CERTIFIED REGISTERED

## 2018-10-06 PROCEDURE — 80048 BASIC METABOLIC PNL TOTAL CA: CPT | Performed by: HOSPITALIST

## 2018-10-06 PROCEDURE — 83605 ASSAY OF LACTIC ACID: CPT | Performed by: INTERNAL MEDICINE

## 2018-10-06 PROCEDURE — 00000146 ZZHCL STATISTIC GLUCOSE BY METER IP

## 2018-10-06 PROCEDURE — A9270 NON-COVERED ITEM OR SERVICE: HCPCS | Mod: GY | Performed by: HOSPITALIST

## 2018-10-06 PROCEDURE — 37000008 ZZH ANESTHESIA TECHNICAL FEE, 1ST 30 MIN: Performed by: INTERNAL MEDICINE

## 2018-10-06 PROCEDURE — 40000170 ZZH STATISTIC PRE-PROCEDURE ASSESSMENT II: Performed by: INTERNAL MEDICINE

## 2018-10-06 PROCEDURE — 21000000 ZZH R&B IMCU HEART CARE

## 2018-10-06 RX ORDER — ONDANSETRON 4 MG/1
4 TABLET, ORALLY DISINTEGRATING ORAL EVERY 30 MIN PRN
Status: DISCONTINUED | OUTPATIENT
Start: 2018-10-06 | End: 2018-10-06 | Stop reason: HOSPADM

## 2018-10-06 RX ORDER — FENTANYL CITRATE 50 UG/ML
25-50 INJECTION, SOLUTION INTRAMUSCULAR; INTRAVENOUS
Status: DISCONTINUED | OUTPATIENT
Start: 2018-10-06 | End: 2018-10-06 | Stop reason: HOSPADM

## 2018-10-06 RX ORDER — SODIUM CHLORIDE, SODIUM LACTATE, POTASSIUM CHLORIDE, CALCIUM CHLORIDE 600; 310; 30; 20 MG/100ML; MG/100ML; MG/100ML; MG/100ML
INJECTION, SOLUTION INTRAVENOUS CONTINUOUS
Status: DISCONTINUED | OUTPATIENT
Start: 2018-10-06 | End: 2018-10-06 | Stop reason: HOSPADM

## 2018-10-06 RX ORDER — LIDOCAINE 40 MG/G
CREAM TOPICAL
Status: DISCONTINUED | OUTPATIENT
Start: 2018-10-06 | End: 2018-10-06 | Stop reason: HOSPADM

## 2018-10-06 RX ORDER — FENTANYL CITRATE 50 UG/ML
INJECTION, SOLUTION INTRAMUSCULAR; INTRAVENOUS PRN
Status: DISCONTINUED | OUTPATIENT
Start: 2018-10-06 | End: 2018-10-06

## 2018-10-06 RX ORDER — FLUMAZENIL 0.1 MG/ML
0.2 INJECTION, SOLUTION INTRAVENOUS
Status: DISCONTINUED | OUTPATIENT
Start: 2018-10-06 | End: 2018-10-07

## 2018-10-06 RX ORDER — NALOXONE HYDROCHLORIDE 0.4 MG/ML
.1-.4 INJECTION, SOLUTION INTRAMUSCULAR; INTRAVENOUS; SUBCUTANEOUS
Status: DISCONTINUED | OUTPATIENT
Start: 2018-10-06 | End: 2018-10-06

## 2018-10-06 RX ORDER — HYDROMORPHONE HYDROCHLORIDE 1 MG/ML
.3-.5 INJECTION, SOLUTION INTRAMUSCULAR; INTRAVENOUS; SUBCUTANEOUS EVERY 5 MIN PRN
Status: DISCONTINUED | OUTPATIENT
Start: 2018-10-06 | End: 2018-10-06 | Stop reason: HOSPADM

## 2018-10-06 RX ORDER — PROPOFOL 10 MG/ML
INJECTION, EMULSION INTRAVENOUS CONTINUOUS PRN
Status: DISCONTINUED | OUTPATIENT
Start: 2018-10-06 | End: 2018-10-06

## 2018-10-06 RX ORDER — ACETAMINOPHEN 650 MG/1
650 SUPPOSITORY RECTAL EVERY 4 HOURS PRN
Status: DISCONTINUED | OUTPATIENT
Start: 2018-10-06 | End: 2018-10-08 | Stop reason: HOSPADM

## 2018-10-06 RX ORDER — ONDANSETRON 2 MG/ML
4 INJECTION INTRAMUSCULAR; INTRAVENOUS EVERY 30 MIN PRN
Status: DISCONTINUED | OUTPATIENT
Start: 2018-10-06 | End: 2018-10-06 | Stop reason: HOSPADM

## 2018-10-06 RX ORDER — PROPOFOL 10 MG/ML
INJECTION, EMULSION INTRAVENOUS PRN
Status: DISCONTINUED | OUTPATIENT
Start: 2018-10-06 | End: 2018-10-06

## 2018-10-06 RX ORDER — INDOMETHACIN 50 MG/1
100 SUPPOSITORY RECTAL
Status: DISCONTINUED | OUTPATIENT
Start: 2018-10-06 | End: 2018-10-06 | Stop reason: HOSPADM

## 2018-10-06 RX ORDER — ONDANSETRON 2 MG/ML
INJECTION INTRAMUSCULAR; INTRAVENOUS PRN
Status: DISCONTINUED | OUTPATIENT
Start: 2018-10-06 | End: 2018-10-06

## 2018-10-06 RX ORDER — ACETAMINOPHEN 500 MG
1000 TABLET ORAL EVERY 8 HOURS PRN
Status: DISCONTINUED | OUTPATIENT
Start: 2018-10-06 | End: 2018-10-06

## 2018-10-06 RX ORDER — LIDOCAINE HYDROCHLORIDE 20 MG/ML
INJECTION, SOLUTION INFILTRATION; PERINEURAL PRN
Status: DISCONTINUED | OUTPATIENT
Start: 2018-10-06 | End: 2018-10-06

## 2018-10-06 RX ADMIN — SODIUM CHLORIDE: 9 INJECTION, SOLUTION INTRAVENOUS at 04:48

## 2018-10-06 RX ADMIN — INSULIN GLARGINE 25 UNITS: 100 INJECTION, SOLUTION SUBCUTANEOUS at 21:31

## 2018-10-06 RX ADMIN — PIPERACILLIN SODIUM,TAZOBACTAM SODIUM 3.38 G: 3; .375 INJECTION, POWDER, FOR SOLUTION INTRAVENOUS at 02:13

## 2018-10-06 RX ADMIN — Medication 0.5 MG: at 23:29

## 2018-10-06 RX ADMIN — Medication 0.5 MG: at 10:05

## 2018-10-06 RX ADMIN — PIPERACILLIN SODIUM,TAZOBACTAM SODIUM 3.38 G: 3; .375 INJECTION, POWDER, FOR SOLUTION INTRAVENOUS at 23:41

## 2018-10-06 RX ADMIN — FENTANYL CITRATE 50 MCG: 50 INJECTION, SOLUTION INTRAMUSCULAR; INTRAVENOUS at 12:18

## 2018-10-06 RX ADMIN — POTASSIUM PHOSPHATE, MONOBASIC AND POTASSIUM PHOSPHATE, DIBASIC 20 MMOL: 224; 236 INJECTION, SOLUTION INTRAVENOUS at 21:31

## 2018-10-06 RX ADMIN — Medication 0.5 MG: at 02:30

## 2018-10-06 RX ADMIN — ONDANSETRON 4 MG: 2 INJECTION INTRAMUSCULAR; INTRAVENOUS at 12:41

## 2018-10-06 RX ADMIN — Medication 0.5 MG: at 18:17

## 2018-10-06 RX ADMIN — INSULIN ASPART 1 UNITS: 100 INJECTION, SOLUTION INTRAVENOUS; SUBCUTANEOUS at 02:30

## 2018-10-06 RX ADMIN — GABAPENTIN 900 MG: 300 CAPSULE ORAL at 19:55

## 2018-10-06 RX ADMIN — Medication 0.5 MG: at 05:52

## 2018-10-06 RX ADMIN — PROPOFOL 50 MCG/KG/MIN: 10 INJECTION, EMULSION INTRAVENOUS at 12:31

## 2018-10-06 RX ADMIN — INSULIN ASPART 1 UNITS: 100 INJECTION, SOLUTION INTRAVENOUS; SUBCUTANEOUS at 21:31

## 2018-10-06 RX ADMIN — ACETAMINOPHEN 1000 MG: 500 TABLET ORAL at 19:55

## 2018-10-06 RX ADMIN — Medication 0.5 MG: at 07:49

## 2018-10-06 RX ADMIN — POTASSIUM PHOSPHATE, MONOBASIC AND POTASSIUM PHOSPHATE, DIBASIC 15 MMOL: 224; 236 INJECTION, SOLUTION INTRAVENOUS at 03:14

## 2018-10-06 RX ADMIN — PIPERACILLIN SODIUM,TAZOBACTAM SODIUM 3.38 G: 3; .375 INJECTION, POWDER, FOR SOLUTION INTRAVENOUS at 08:44

## 2018-10-06 RX ADMIN — PHENYLEPHRINE HYDROCHLORIDE 100 MCG: 10 INJECTION, SOLUTION INTRAMUSCULAR; INTRAVENOUS; SUBCUTANEOUS at 12:25

## 2018-10-06 RX ADMIN — LIDOCAINE HYDROCHLORIDE 100 MG: 20 INJECTION, SOLUTION INFILTRATION; PERINEURAL at 12:18

## 2018-10-06 RX ADMIN — GABAPENTIN 600 MG: 600 TABLET, FILM COATED ORAL at 08:44

## 2018-10-06 RX ADMIN — METOPROLOL SUCCINATE 200 MG: 100 TABLET, EXTENDED RELEASE ORAL at 08:43

## 2018-10-06 RX ADMIN — PIPERACILLIN SODIUM,TAZOBACTAM SODIUM 3.38 G: 3; .375 INJECTION, POWDER, FOR SOLUTION INTRAVENOUS at 17:55

## 2018-10-06 RX ADMIN — ONDANSETRON 4 MG: 2 INJECTION INTRAMUSCULAR; INTRAVENOUS at 07:55

## 2018-10-06 RX ADMIN — MIDAZOLAM 2 MG: 1 INJECTION INTRAMUSCULAR; INTRAVENOUS at 12:15

## 2018-10-06 RX ADMIN — INSULIN ASPART 1 UNITS: 100 INJECTION, SOLUTION INTRAVENOUS; SUBCUTANEOUS at 18:11

## 2018-10-06 RX ADMIN — INSULIN ASPART 2 UNITS: 100 INJECTION, SOLUTION INTRAVENOUS; SUBCUTANEOUS at 06:40

## 2018-10-06 RX ADMIN — PROPOFOL 200 MG: 10 INJECTION, EMULSION INTRAVENOUS at 12:18

## 2018-10-06 RX ADMIN — INSULIN ASPART 2 UNITS: 100 INJECTION, SOLUTION INTRAVENOUS; SUBCUTANEOUS at 10:05

## 2018-10-06 RX ADMIN — SUCCINYLCHOLINE CHLORIDE 200 MG: 20 INJECTION, SOLUTION INTRAMUSCULAR; INTRAVENOUS; PARENTERAL at 12:18

## 2018-10-06 RX ADMIN — SODIUM CHLORIDE, POTASSIUM CHLORIDE, SODIUM LACTATE AND CALCIUM CHLORIDE: 600; 310; 30; 20 INJECTION, SOLUTION INTRAVENOUS at 11:19

## 2018-10-06 RX ADMIN — INSULIN ASPART 1 UNITS: 100 INJECTION, SOLUTION INTRAVENOUS; SUBCUTANEOUS at 15:49

## 2018-10-06 ASSESSMENT — PAIN DESCRIPTION - DESCRIPTORS
DESCRIPTORS: SHARP
DESCRIPTORS: SHARP;ACHING
DESCRIPTORS: ACHING
DESCRIPTORS: SHARP

## 2018-10-06 ASSESSMENT — ENCOUNTER SYMPTOMS: DYSRHYTHMIAS: 1

## 2018-10-06 NOTE — PLAN OF CARE
Problem: Patient Care Overview  Goal: Plan of Care/Patient Progress Review  Outcome: Improving  Patient doing well post op ERCP.  VSS.  Had 2 loose BMs, passing gas and belching.  Medicated for pain times one this afternoon.  Tolerating clear liquids.  Afebrile.  Continue to monitor.

## 2018-10-06 NOTE — PROVIDER NOTIFICATION
Paged Dr. Garcia 10/05/18 7:53 PM regarding temp 100.9 F; orders for transition from gtt to sliding scale insulin; fluid orders; tylenol limits with elevated liver enzymes. Orders received: MD to place sliding scale insulin, fluid, and lab orders; labs to switch to q6hr; stop insulin gtt at 20:45; tylenol limit of 2 gm daily.

## 2018-10-06 NOTE — PROGRESS NOTES
[facility]  Gastroenterology Progress Note     Go Saavedra MRN# [id number]   YOB: 1950 Age: 68 year old          Assessment and Plan:     DKA (diabetic ketoacidosis) (H)  Doing well patient could not have good night sleep otherwise patient is feeling much better nausea is improved abdominal pain is improved.  Patient has minimal increase in his white blood cell count.  Patient denied any fever chills.  ERCP done today showed 1 common bile duct stone and sludge along with some mucus and possible pus.  Patient tolerated procedure very well patient was treated with endoscopic sphincterotomy.  Continue on IV antibiotics.  I will recommend him to be evaluated in surgery for possible cholecystectomy.  Clear liquid diet today.              Severe sepsis (H)  Diabetic ketoacidosis without coma associated with diabetes mellitus due to underlying condition (H)  Ascending cholangitis  Acute biliary pancreatitis, unspecified complication status      Interval History:   doing well; no cp, sob, n/v/d, or abd pain.              Review of Systems:   C: NEGATIVE for fever, chills, change in weight  E/M: NEGATIVE for ear, mouth and throat problems  R: NEGATIVE for significant cough or SOB  CV: NEGATIVE for chest pain, palpitations or peripheral edema             Medications:   I have reviewed this patient's current medications    gabapentin  900 mg Oral QPM     gabapentin (NEURONTIN) tablet 600 mg  600 mg Oral QAM     insulin aspart  1-6 Units Subcutaneous Q4H     insulin glargine  25 Units Subcutaneous At Bedtime     metoprolol succinate  200 mg Oral Daily     piperacillin-tazobactam  3.375 g Intravenous Q6H     sodium chloride (PF)  3 mL Intracatheter Q8H                  Physical Exam:   Vitals were reviewed  Vital Signs with Ranges  Temp:  [96.5  F (35.8  C)-100.9  F (38.3  C)] 98  F (36.7  C)  Heart Rate:  [] 71  Resp:  [8-25] 12  BP: (114-161)/(46-96) 154/68  SpO2:  [81 %-99 %] 98  %  I/O last 3 completed shifts:  In: 2214.14 [P.O.:150; I.V.:2064.14]  Out: 1325 [Urine:1325]  Constitutional: healthy, alert and no distress   Cardiovascular: negative, PMI normal. No lifts, heaves, or thrills. RRR. No murmurs, clicks gallops or rub  Respiratory: negative, Percussion normal. Good diaphragmatic excursion. Lungs clear  Head: Normocephalic. No masses, lesions, tenderness or abnormalities  Neck: Neck supple. No adenopathy. Thyroid symmetric, normal size,, Carotids without bruits.  Abdomen: Abdomen soft, non-tender. BS normal. No masses, organomegaly           Data:   I reviewed the patient's new clinical lab test results.   Recent Labs   Lab Test  10/06/18   1326  10/06/18   0546  10/05/18   1555  10/05/18   1140   WBC   --   16.3*  14.2*  19.6*   HGB   --   12.6*  13.4  15.5   MCV   --   91  89  89   PLT  164  157  189  248     Recent Labs   Lab Test  10/06/18   1322  10/06/18   0546  10/06/18   0055   POTASSIUM  4.9  4.7  4.7   CHLORIDE  107  105  106   CO2  22  23  26   BUN  14  13  14   ANIONGAP  8  8  6     Recent Labs   Lab Test  10/06/18   0546  10/05/18   1806  10/05/18   1555  10/05/18   1450  10/05/18   1140  10/26/17   1135   ALBUMIN  2.9*  3.2*  3.2*   --   3.9  3.8   BILITOTAL  1.5*  1.8*  2.6*   --   4.0*  0.7   ALT  267*  347*  364*   --   428*  46   AST  117*  143*  157*   --   146*  28   PROTEIN   --    --    --   Negative   --    --    LIPASE  190   --    --    --   1072*  179       I reviewed the patient's new imaging results.    All laboratory data reviewed  All imaging studies reviewed by me.    Oc Yang MD,  10/6/2018  [facility]  Office : [phone]  Cell: [phone]

## 2018-10-06 NOTE — ANESTHESIA CARE TRANSFER NOTE
Patient: Go Saavedra    Procedure(s):  ENDOSCOPIC ULTRASOUND, ENDOSCOPIC RETROGRADE CHOLANGIOPANCREATOGRAM (MAC) - Wound Class: II-Clean Contaminated   - Wound Class: II-Clean Contaminated    Diagnosis: UNKNOWN  Diagnosis Additional Information: No value filed.    Anesthesia Type:   General, ETT     Note:  Airway :Face Mask  Patient transferred to:PACU  Comments: Anesthesia Care Note    Patient: Go Saavedra    Transferred to: PACU    Patient vital signs: Stable    Airway: FM    Monitors placed. VSS. PIV patent. No change in dentition. Report given to DARIO Leone CRNA   10/6/2018  Handoff Report: Identifed the Patient, Identified the Reponsible Provider, Reviewed the pertinent medical history, Discussed the surgical course, Reviewed Intra-OP anesthesia mangement and issues during anesthesia, Set expectations for post-procedure period and Allowed opportunity for questions and acknowledgement of understanding      Vitals: (Last set prior to Anesthesia Care Transfer)    CRNA VITALS  10/6/2018 1224 - 10/6/2018 1258      10/6/2018             Pulse: 77    SpO2: 100 %    Resp Rate (observed): 24                Electronically Signed By: MACY Mena CRNA  October 6, 2018  12:58 PM

## 2018-10-06 NOTE — H&P
Admitted:     10/05/2018      HOSPITALIST ADMISSION      DATE OF ADMISSION:  10/05/2018      PRIMARY CARE PROVIDER:  Edwina Rodriguez MD.      CHIEF COMPLAINT:  Abdominal pain.      History was provided by the patient.      HISTORY OF PRESENT ILLNESS:  Go Saavedra is a pleasant 68-year-old  male with history of HLD, HTN, ischemic cardiomyopathy, NSVT status post AICD, and CAD status post 3-vessel CABG, and type 2 diabetes who presented to the Emergency Department with persistent right upper quadrant abdominal pain and associated nausea and vomiting x 2 days.  The patient reports he ate 6 buffalo wings on the evening of symptom onset.  He does not drink alcohol.  He has no history of pancreatitis.  He denies fevers, recent chest pain, difficulty breathing, orthopnea, lower extremity edema.  Due to his symptoms, he did not take his diabetes medications including metformin and both long and short-acting insulin yesterday or on the day of admission.      In the Emergency Department, the patient was found to be afebrile, but tachycardic into the 120s to 130s and hypertensive.  Basic labs were obtained and showed LFT elevation with a total bili of 4.0 and lipase elevation of about 1100.  Additionally, he was found to have an elevated anion gap of 16 with quantitative ketones of 1.7, lactic acid of 4.4 and blood glucose of 360.  His white blood cell count was 19.6.  D-dimer was also elevated at 1.5.  CT chest, abdomen and pelvis was obtained and no evidence for PE, but did suggest mild pancreatitis with subtle induration of the peripancreatic fat.  He was treated with approximately 3.5 normal saline fluid bolus and started on an insulin drip.  He was treated empirically with Zosyn due to evidence for severe sepsis and possible gallstone pancreatitis.      Presently, the patient is evaluated at bedside and appears markedly well, given his tachycardia and evidence of sepsis on laboratory evaluation.  At present,  his pain is well controlled until pushing on his abdomen.  His repeat lactic acid has returned at 2.0, or within normal limits following above fluid bolus.  The patient reports having diarrhea, 2-3 episodes since symptom onset above.  No hematochezia or melena noted.      PAST MEDICAL HISTORY:   1.  Type 2 diabetes.   2.  CAD with history of inferior MI in  with late presentation and unsuccessful revascularization of the RCA.  Subsequently underwent 3-vessel CABG 2008.   3.  Nonsustained V-tach, status post AICD.   4.  Ischemic cardiomyopathy with EF 35%, status post AICD.  Most recent echo in 2018 with EF 50%.   5.  HTN.   6.  HLD.   7.  Diabetic peripheral neuropathy.      PAST SURGICAL HISTORY:   1.  Three-vessel CABG, LIMA to LAD, vein graft to diagonal and posterior descending artery.   2.  Left heart catheterization, .   3.  Status post AICD.      FAMILY HISTORY:  Father:  Parents both  at older age.  Father had history of heart valve replacement.  Brother with pancreatic cancer.      SOCIAL HISTORY:  The patient is a never smoker, does not use alcohol, and does not use illicit drugs.  He is a retired [employer]  x 42 years.      PRIOR TO ADMISSION MEDICATIONS:   1.  Aspirin 81 mg daily.   2.  Atorvastatin 80 mg daily.   3.  Gabapentin 600 mg q.a.m.   4.  Gabapentin 900 mg at bedtime.   5.  Ibuprofen 200 mg every 6 hours p.r.n.   6.  NovoLog 26 units t.i.d. a.c.   7.  Lantus 58 units every morning.   8.  Lisinopril 40 mg daily.   9.  Metformin 500 mg daily with dinner.   10.  Toprol  mg daily.   11.  PRN nitroglycerin.   12.  Spironolactone 75 mg daily.      ALLERGIES:  NO KNOWN DRUG ALLERGIES.      REVIEW OF SYSTEMS:  A complete review of systems was performed and is negative except for that noted in the HPI.      PHYSICAL EXAMINATION:   VITAL SIGNS:  Blood pressure 144/80, temperature 99.5, respirations 18, oxygen saturation 94%.  Weight is 117.9 kg.   GENERAL:  Pleasant male  who appears stated age.  He looks comfortable lying flat in bed.   SKIN:  Warm, dry.  No rash or lesions on exposed skin.   HEENT:  Normocephalic, atraumatic.  EOMs grossly intact, PERRLA.  Dry mucous membranes.   NECK:  Supple.  No cervical lymphadenopathy or JVD.   LUNGS:  Breath sounds clear to auscultation.  No increased work of breathing on 1 liter of supplemental O2.   CARDIOVASCULAR:  Tachycardic.  No rub or murmur appreciated.  No peripheral edema.  DP pulses detected and symmetric.   ABDOMEN:  Obese, guarding with diffuse mild abdominal tenderness that is moderate to palpation in the right upper quadrant and left lower quadrant.   MUSCULOSKELETAL:  Moves all 4 extremities.   NEUROLOGIC:  Cranial nerves II-XII grossly intact.      LABORATORY DATA:     -- Sodium 136, potassium 4.3, creatinine 0.95, GFR 79, anion gap of 16.   -- Total bilirubin 4.0, alk phos 152, , .    -- Lipase 1072.   -- Blood glucose 360, quantitative ketones 1.7.   -- Lactic acid 4.4, 2.0.   -- WBC 19.6, hgb 15.5, platelets 248.   -- D-dimer 1.5.   -- Urinalysis with greater than 1000 glucosuria and 40 ketones and elevated specific gravity.   -- Blood cultures x 2 pending.      IMAGING:  CT chest, abdomen and pelvis with contrast per radiologist's interpretation shows:   1.  No evidence of pulmonary embolism.   2.  Subtle induration of the peripancreatic fat which could represent mild pancreatitis.      ASSESSMENT AND PLAN:  Go Saavedra is a pleasant 68-year-old  male with history of HLD, HTN, NSVT and ischemic cardiomyopathy status post AICD, CAD status post 3-vessel CABG, and type 2 diabetes, who presented to the Emergency Department with nausea, vomiting and right upper quadrant abdominal pain x 2 days found to have evidence of severe sepsis secondary to acute pancreatitis and DKA.    Severe sepsis secondary to suspected acute gallstone pancreatitis.  Elevated LFTs and total bili 4.0 on admission, lipase  around 1100.  WBC 19.6, tachycardic and lactic acid elevated at 4.4 on admission.  Treated with 3.5 normal saline liter bolus and Zosyn in the Emergency Department.  CT of chest, abdomen and pelvis suggests mild pancreatitis with subtle induration of the peripancreatic fat.   -- To receive additional 1 liter IV fluid bolus for DKA below and then aggressive half normal saline with potassium at 200 mL per hour to treat both pancreatitis and DKA below.   -- Continues on Zosyn antibiotic initiated in the Emergency Department due to suspected gallstone pancreatitis and concern for progression to ascending cholangitis.   -- Blood cultures x 2 pending.   -- NPO.   -- PRN analgesics and antiemetics available.   -- The patient was discussed between attending provider and Dr. Yang with Gastroenterology for consideration of ERCP, who will see the patient later today.   -- Obtain right upper quadrant ultrasound.   -- Obtain procalcitonin.   -- Recheck LFTs in 2 hours.     DKA.  Type 2 diabetes with peripheral neuropathy.  Precipitated by above and holding diabetic medications x 2 days due to not eating.  Anion gap 16, ketones 1.7, lactic acid 4.4, blood glucose 360, WBC 20.  Started on an insulin drip and received 3.5 liter normal saline IV fluid bolus as above in the Emergency Department.  [PTA: Lantus 58u QAM, Novolog 26u TID AC, metformin 500mg with dinner, gabapentin 600mg QAM and 900mg QHS]  -- Continue insulin drip per protocol. Contact provider to discontinue when anion gap and ketones normalized.  -- Lantus 25 units 2 hours prior to discontinuation of insulin infusion and initiate NPO medium SSI.  Note patient indicates he takes 50 units Lantus not 58 units as noted in pharmacy med reconciliation.  -- Half normal saline with potassium at 200 mL per hour per DKA protocol and with high rate to treat acute pancreatitis as above.  Transition to D5 IV fluids per protocol.   -- Repeat BMP and ketones every 2 hours.   --  Continue n.p.o. After insulin drip discontinued d/t pancreatitis above.  -- PTA regimen on hold, reason for gabapentin being to await medical stabilization and reduce potential for AMS.    CAD status post 3v-CABG ().   Ischemic cardiomyopathy and history NSVT status post AICD ().  EF 50% (2018).   Denies recent chest pain, shortness of breath, dyspnea on exertion, orthopnea, lower extremity edema.   -- Hold prior to admission aspirin as may undergo procedure for further evaluation of pancreatitis and atorvastatin until LFTs normalizing.   -- Continue prior to admission Toprol 200 mg daily with hold parameters in place.   -- Hold PTA spironolactone and lisinopril.  -- Daily weights and I&Os.  -- Monitor for signs fluid overload with aggressive fluids as above and underlying cardiac history.    Elevated D-dimer.  CT PE study negative for PE.    DVT prophylaxis:  PCDs.      CODE STATUS:  FULL CODE discussed with patient at time of admission.      DISPOSITION:  Anticipate discharge in greater than 2 days pending resolution of DKA and improvement in abdominal pain, nausea, vomiting, as well as complete GI workup and evaluation.      This patient was discussed with Dr. Luisa Tracy of the Hospitalist Service who was in agreement with my assessment and plan of care as indicated above.         LUISA TRACY, DO       As dictated by JOANNA ULMEN BARTHELL, PA-C            D: 10/05/2018   T: 10/05/2018   MT: JAMAL      Name:     AUDREY MENDEZ   MRN:      [id number]        Account:      [id number]   :      1950        Admitted:     10/05/2018                   Document: [id number]       cc: Edwina Rodriguez MD

## 2018-10-06 NOTE — PROGRESS NOTES
Acute GS pancreatitis with possible low grade cholangitis.   DKA  Continue I/V pain control, I/V antibiotics and I/V fluids.  Plan for EUS and ERCP tomorrow.    Full consult dictated    Oc Yang MD

## 2018-10-06 NOTE — CONSULTS
[facility]  Gastroenterology Consultation         Go Saavedra  [address] [facility] [place] [address]  68 year old male    Admission Date/Time: 10/5/2018  Primary Care Provider: Edwina Rodriguez  Referring / Attending Physician:  Dr. Barthell    We were asked to see the patient in consultation by Dr. Barthell  for evaluation of acute abdominal pain        CC: Acute abdominal pain with nausea vomiting    HPI:  Go Saavedra is a 68 year old male who was admitted through ER with history of acute onset of GI symptoms with onset of abdominal pain in the epigastric area multiple episodes of nausea vomiting some loose stools.  Patient has a known history of coronary artery disease defibrillator diabetes mellitus.   Patient was in mild diabetic ketoacidosis.  Patient is currently on IV insulin IV fluid patient is on IV medications for pain control overall patient is doing very well patient is denying any history of nausea now patient is still fairly uncomfortable in the upper abdomen area patient had episodes of Reiger's and chills yesterday patient has not noticed feverish since then patient has not taken any temperature.  Patient denied any history of chest pain shortness of breath or any other significant systemic complaints.  Patient denied any history of drinking recent travel or any other aggravating factor.  Patient has significantly elevated pancreatic function tests and liver function tests along with minimally elevated white count.  -  ROS: A comprehensive ten point review of systems was negative aside from those in mentioned in the HPI.      PAST MED HX:  I have reviewed this patient's medical history and updated it with pertinent information if needed.   Past Medical History:   Diagnosis Date     Coronary artery disease      Diabetes mellitus (H)      History of coronary artery bypass graft x 3 2008     Hyperlipidemia      Ischemic cardiomyopathy 2008     MI (myocardial  infarction) (H) 11/2008    inferior     Ventricular tachycardia (H)        MEDICATIONS:   Prior to Admission Medications   Prescriptions Last Dose Informant Patient Reported? Taking?   Aspirin (ASPIR-81 PO) 10/4/2018 at Unknown time Self Yes Yes   Sig: Take 81 mg by mouth daily    Gabapentin (NEURONTIN PO) 10/4/2018 at Unknown time Self Yes Yes   Sig: Take 600 mg by mouth every morning   Gabapentin (NEURONTIN PO) 10/4/2018 at Unknown time Self Yes Yes   Sig: Take 900 mg by mouth every evening   IBUPROFEN PO  Self Yes Yes   Sig: Take 200 mg by mouth every 6 hours as needed for moderate pain   atorvastatin (LIPITOR) 80 MG tablet 10/4/2018 at Unknown time Self Yes Yes   Sig: Take 80 mg by mouth daily   insulin aspart (NOVOLOG FLEXPEN) 100 UNIT/ML injection 10/4/2018 at Unknown time Self Yes Yes   Sig: Inject 26 Units Subcutaneous 3 times daily (with meals)   insulin glargine (LANTUS SOLOSTAR) 100 UNIT/ML injection 10/4/2018 at Unknown time Self Yes Yes   Sig: Inject 58 Units Subcutaneous every morning    insulin pen needle (NOVOFINE) 30G X 8 MM  Self Yes No   Sig: Use 5 pen needles daily or as directed.   lisinopril (PRINIVIL/ZESTRIL) 40 MG tablet 10/4/2018 at Unknown time Self Yes Yes   Sig: Take 40 mg by mouth daily   metFORMIN (GLUCOPHAGE-XR) 500 MG 24 hr tablet 10/4/2018 at Unknown time Self Yes Yes   Sig: Take 500 mg by mouth daily (with dinner)   metoprolol (TOPROL-XL) 100 MG 24 hr tablet 10/4/2018 at Unknown time Self Yes Yes   Sig: Take 200 mg by mouth daily   nitroGLYcerin (NITROSTAT) 0.4 MG sublingual tablet  Self Yes Yes   Sig: Place 0.4 mg under the tongue every 5 minutes as needed for chest pain For chest pain place 1 tablet under the tongue every 5 minutes for 3 doses. If symptoms persist 5 minutes after 1st dose call 911.   spironolactone (ALDACTONE) 25 MG tablet 10/4/2018 at Unknown time Self Yes Yes   Sig: Take 75 mg by mouth daily       Facility-Administered Medications: None       ALLERGIES: No Known  Allergies    SOCIAL HISTORY:  Social History   Substance Use Topics     Smoking status: Never Smoker     Smokeless tobacco: Never Used     Alcohol use No       FAMILY HISTORY:  Family History   Problem Relation Age of Onset     Cancer Brother      Diabetes No family hx of      Hypertension No family hx of        PHYSICAL EXAM:   General awake alert oriented  Vital Signs with Ranges  Temp: 100.9  F (38.3  C) Temp src: Oral BP: 129/68   Heart Rate: 107 Resp: 17 SpO2: 96 % O2 Device: None (Room air) Oxygen Delivery: 1 LPM       Constitutional: healthy, alert and no distress   Cardiovascular: negative, PMI normal. No lifts, heaves, or thrills. RRR. No murmurs, clicks gallops or rub  Respiratory: negative, Percussion normal. Good diaphragmatic excursion. Lungs clear  Head: Normocephalic. No masses, lesions, tenderness or abnormalities  Neck: Neck supple. No adenopathy. Thyroid symmetric, normal size,, Carotids without bruits.  Abdomen: Abdomen soft, non-tender. BS normal. No masses, organomegaly          ADDITIONAL COMMENTS:   I reviewed the patient's new clinical lab test results.   Recent Labs   Lab Test  10/05/18   1555  10/05/18   1140  10/26/17   1135   WBC  14.2*  19.6*  6.8   HGB  13.4  15.5  15.1   MCV  89  89  86   PLT  189  248  231     Recent Labs   Lab Test  10/05/18   1806  10/05/18   1555  10/05/18   1140   POTASSIUM  4.0  4.2  4.3   CHLORIDE  107  106  99   CO2  26  23  21   BUN  15  15  20   ANIONGAP  7  8  16*     Recent Labs   Lab Test  10/05/18   1806  10/05/18   1555  10/05/18   1450  10/05/18   1140  10/26/17   1135   ALBUMIN  3.2*  3.2*   --   3.9  3.8   BILITOTAL  1.8*  2.6*   --   4.0*  0.7   ALT  347*  364*   --   428*  46   AST  143*  157*   --   146*  28   PROTEIN   --    --   Negative   --    --    LIPASE   --    --    --   1072*  179       I reviewed the patient's new imaging results.        CONSULTATION ASSESSMENT AND PLAN:    Active Problems:    DKA (diabetic ketoacidosis) (H)     Assessment: Very pleasant 68-year-old gentleman with known history of diabetes significantly advanced coronary artery disease with implanted defibrillator diabetes who was admitted with acute GI symptoms consistent with possible acute gallstone pancreatitis with elevated liver function tests and diabetic ketoacidosis.  Patient is currently being treated with IV fluids IV insulin pain management and n.p.o.  Patient is on IV antibiotics.  Patient findings are concerning for possible cholangitis.  I will recommend the patient to continue on the current plan.  We will plan to evaluate his biliary tract with possible ERCP and endoscopic ultrasound tomorrow repeat her CBC and white blood cells in the morning.  Patient will need further evaluation and general surgery for possible cholecystectomy.  Risks benefit complications were discussed in detail with patient.  Thank you very much for letting me participate in his care.                 Oc Yang MD, FACP  [facility].  Office: [phone]  Cell : [phone] Dr. Reinoso

## 2018-10-06 NOTE — PROGRESS NOTES
Hospitalist Progress Note    Date of Service: 10/06/2018         Assessment and Plan:       Go Saavedra is a pleasant 68-year-old  male with history of HLD, HTN, NSVT and ischemic cardiomyopathy status post AICD, CAD status post 3-vessel CABG, and type 2 diabetes, who presented to the Emergency Department with nausea, vomiting and right upper quadrant abdominal pain x 2 days admitted to [facility] on 10/05/2018 for severe sepsis secondary to acute pancreatitis and DKA.    Severe sepsis secondary to suspected acute gallstone pancreatitis.  Assessment: Elevated LFTs and total bili 4.0 on admission, lipase around 1100.  WBC 19.6, tachycardic and lactic acid elevated at 4.4 on admission.  Treated with 3.5 normal saline liter bolus and Zosyn in the Emergency Department.  CT of chest, abdomen and pelvis suggests mild pancreatitis with subtle induration of the peripancreatic fat. Received additional 1 liter IV fluid bolus for DKA . ERCP showed Choledocholithiasis was found. Complete removal was accomplished by biliary sphincterotomy and balloon extraction. On Zosyn for likely cholangitis.  Plan:  -- Continues on Zosyn antibiotic due to ascending cholangitis.   -- Blood cultures x 2 pending.   -- Clear liquid diet   -- PRN analgesics and antiemetics available.   -- Obtain right upper quadrant ultrasound.   -- Monitor vitals/temp  -- General Surgery consult post ERCP     DKA.  Type 2 diabetes with peripheral neuropathy.  Assessment: Precipitated by above and holding diabetic medications x 2 days due to not eating.  Anion gap 16, ketones 1.7, lactic acid 4.4, blood glucose 360, WBC 20.  Started on an insulin drip and received 3.5 liter normal saline IV fluid bolus as above in the Emergency Department.  [PTA: Lantus 50u QAM, Novolog 26u TID AC, metformin 500mg with dinner, gabapentin 600mg QAM and 900mg QHS]. Insulin gtt stopped, transitioned to subcutaneous insulin with no  complications.  Assessment:   -- Lantus 25 U starting this evening. Titrate based on trend over next 24 hours  -- D5 IV fluids per protocol.    -- Follow BG  -- Hypoglycemia protocol  -- NPO after midnight     CAD status post 3v-CABG (2008).   Ischemic cardiomyopathy and history NSVT status post AICD (2008).  EF 50% (01/2018).   Assessment: Denies recent chest pain, shortness of breath, dyspnea on exertion, orthopnea, lower extremity edema.   Plan:   --Hold prior to admission aspirin as may undergo procedure for further evaluation of pancreatitis and atorvastatin until LFTs normalizing.   -- Continue prior to admission Toprol 200 mg daily with hold parameters in place.   -- Hold PTA spironolactone and lisinopril.  -- Daily weights and I&Os.  -- Monitor for signs fluid overload with aggressive fluids as above and underlying cardiac history.     Elevated D-dimer.  CT PE study negative for PE.      Active Diet Order      NPO for Medical/Clinical Reasons Except for: Ice Chips, Meds    DVT Prophylaxis: Pneumatic Compression Devices  Code Status: Prior    Disposition: Expected discharge pending ERCP and gen surg consult    Attestation:   I have reviewed today's relevant vital signs, notes, medications, labs and imaging.I personally reviewed the Chest/Abdomen CT image(s) showing see below.  1. No evidence for pulmonary embolism.  2. Subtle induration of the peripancreatic fat could represent mild  pancreatitis and clinical correlation is recommended.  3. No other significant abnormality.    Otoniel Pandya MD  [facility]  Text Page  (7am - 6pm)        Interval History:        Epigastric pain 6-7/10  No fever, no CP/SOB  No nausea/vomiting  No further complaints         Physical Exam:        Physical Exam   Temp: 98.5  F (36.9  C) Temp src: Oral BP: 146/74   Heart Rate: 88 Resp: 16 SpO2: 94 % O2 Device: None (Room air) Oxygen Delivery: 2 LPM  Vitals:    10/05/18 1128 10/05/18 1534 10/06/18 0707   Weight:  117.9 kg (260 lb) 115.6 kg (254 lb 13.6 oz) 115.5 kg (254 lb 11.2 oz)     Vital Signs with Ranges  Temp:  [97.8  F (36.6  C)-100.9  F (38.3  C)] 98.5  F (36.9  C)  Heart Rate:  [] 88  Resp:  [8-31] 16  BP: (114-191)/() 146/74  SpO2:  [82 %-100 %] 94 %  I/O last 3 completed shifts:  In: 1714.14 [P.O.:150; I.V.:1564.14]  Out: 950 [Urine:950]    Constitutional: Awake, alert, cooperative, no apparent distress.   Respiratory: Clear to auscultation bilaterally, no crackles or wheezing  HEENT: atraumatic. No yellowing of eyes  Neck: supple, full ROM  Cardiovascular: Regular rate and rhythm, normal S1 and S2, and no murmur noted  GI: Normal bowel sounds, soft, non-distended, epigastric tenderness, No HSM  Skin/Integumen: No rashes, no cyanosis, no edema  Other: Normal mood and affect  Neuro: A/Ox3. Moving all extremities    # Pain Assessment:  Current Pain Score 10/6/2018   Patient currently in pain? yes   Pain score (0-10) 6   Pain location Abdomen   Pain descriptors Sharp   - Go is experiencing pain due to pancreatitis. Pain management was discussed and the plan was created in a collaborative fashion.  Go's response to the current recommendations: engaged  - Please see the plan for pain management as documented above         Data:     CBC RESULTS:    Recent Labs  Lab 10/06/18  0546 10/05/18  1555 10/05/18  1140   WBC 16.3* 14.2* 19.6*   RBC 4.14* 4.31* 4.90   HGB 12.6* 13.4 15.5   HCT 37.5* 38.4* 43.8    189 248       BASIC METABOLIC PANEL:    Recent Labs  Lab 10/06/18  0546 10/06/18  0055 10/05/18  1806 10/05/18  1555 10/05/18  1140    138 140 137 136   POTASSIUM 4.7 4.7 4.0 4.2 4.3   CHLORIDE 105 106 107 106 99   CO2 23 26 26 23 21   BUN 13 14 15 15 20   CR 0.71 0.78 0.84 0.85 0.95   * 187* 193* 268* 360*   CLAIR 8.0* 8.1* 8.5 8.4* 9.8       HEPATIC FUNCTION PANEL    Recent Labs  Lab 10/05/18  1806 10/05/18  1555 10/05/18  1140   * 157* 146*   * 364* 428*    ALKPHOS 122 124 152*   BILITOTAL 1.8* 2.6* 4.0*     INR  No results for input(s): INR in the last 168 hours.    OTHER LABS    None    Medications     sodium chloride 150 mL/hr at 10/06/18 0448       gabapentin  900 mg Oral QPM     gabapentin (NEURONTIN) tablet 600 mg  600 mg Oral QAM     insulin aspart  1-6 Units Subcutaneous Q4H     metoprolol succinate  200 mg Oral Daily     piperacillin-tazobactam  3.375 g Intravenous Q6H     sodium chloride (PF)  3 mL Intracatheter Q8H         Recent Results (from the past 24 hour(s))   CT Chest (PE) Abdomen Pelvis w Contrast    Narrative    CT CHEST PE ABDOMEN AND PELVIS WITH CONTRAST 10/5/2018 1:58 PM    HISTORY: Abdominal pain and vomiting with elevated D-dimer.    TECHNIQUE: Helical axial scans from the lung apices through pubic  symphysis with 131 mL Isovue-370 IV contrast. Radiation dose for this  scan was reduced using automated exposure control, adjustment of the  mA and/or kV according to patient size, or iterative reconstruction  technique.    COMPARISON: None.    FINDINGS:    Chest: There is no CT evidence for pulmonary embolism or other acute  vascular abnormality of the chest. Prior median sternotomy. Multi lead  cardiac device. Coronary artery calcifications. Mediastinal and hilar  structures are otherwise unremarkable. The lungs are clear  bilaterally.    Abdomen and pelvis: The liver and spleen are unremarkable. There is  very subtle induration of the fat around the pancreas which could  represent mild pancreatitis and clinical correlation is recommended.  The pancreas is otherwise unremarkable. The adrenal glands bilaterally  and left kidney are normal. The right kidney is normal with exception  of a benign cyst in the upper pole measuring 2 cm. The bowel and  mesentery in the upper abdomen show no abnormality. Mild vascular  calcifications.    Scans through the pelvis show no acute abnormality or free fluid. The  appendix is identified and appears normal.  Multilevel degenerative  disc disease in the lumbar spine. Mild degenerative changes of the  hips bilaterally. Chronic ossifications in the region of the right  hamstring tendon origins, likely related to chronic injury.      Impression    IMPRESSION:  1. No evidence for pulmonary embolism.  2. Subtle induration of the peripancreatic fat could represent mild  pancreatitis and clinical correlation is recommended.  3. No other significant abnormality.    NANCY ALVAREZ MD

## 2018-10-06 NOTE — ANESTHESIA PREPROCEDURE EVALUATION
Anesthesia Evaluation     .             ROS/MED HX    ENT/Pulmonary:       Neurologic:       Cardiovascular:     (+) Dyslipidemia, hypertension--CAD, -past MI,CABG-date: 11/2008, . : . . . :ICD Reason placed:VT  type;Dual lead [employer] (RA, RV) . dysrhythmias Other, Irregular Heartbeat/Palpitations, .       METS/Exercise Tolerance:     Hematologic:         Musculoskeletal:         GI/Hepatic:         Renal/Genitourinary:         Endo:     (+) type II DM Diabetic complications: neuropathy, .      Psychiatric:         Infectious Disease:         Malignancy:         Other:                     Physical Exam  Normal systems: cardiovascular, pulmonary and dental    Airway   Mallampati: II  TM distance: >3 FB  Neck ROM: full    Dental     Cardiovascular       Pulmonary                     Anesthesia Plan      History & Physical Review  History and physical reviewed and following examination; no interval change.    ASA Status:  3 .        Plan for General and ETT with Intravenous induction.   PONV prophylaxis:  Ondansetron (or other 5HT-3)  Additional equipment: Videolaryngoscope      Postoperative Care      Consents  Anesthetic plan, risks, benefits and alternatives discussed with:  Patient..                          .

## 2018-10-06 NOTE — OR NURSING
Patient Go Saavedra is in stable condition and has met criteria for PACU discharge.  MDA Dr. WINSOME Burton at bedside and a sign out was given for Unit/Station transfer.

## 2018-10-06 NOTE — ANESTHESIA POSTPROCEDURE EVALUATION
Patient: Go Saavedra    Procedure(s):  ENDOSCOPIC ULTRASOUND, ENDOSCOPIC RETROGRADE CHOLANGIOPANCREATOGRAM (MAC) - Wound Class: II-Clean Contaminated   - Wound Class: II-Clean Contaminated    Diagnosis:UNKNOWN  Diagnosis Additional Information: No value filed.    Anesthesia Type:  General, ETT    Note:  Anesthesia Post Evaluation    Patient location during evaluation: PACU  Patient participation: Able to fully participate in evaluation  Level of consciousness: awake and alert  Pain management: adequate  Airway patency: patent  Cardiovascular status: acceptable and hemodynamically stable  Respiratory status: acceptable and unassisted  Hydration status: acceptable  PONV: none             Last vitals:  Vitals:    10/06/18 1330 10/06/18 1340 10/06/18 1350   BP: 136/72 140/69 142/73   Resp: 12 20 25   Temp:      SpO2: 97% 96% 96%         Electronically Signed By: Austin Burton DO  October 6, 2018  2:08 PM

## 2018-10-06 NOTE — PLAN OF CARE
Problem: Patient Care Overview  Goal: Plan of Care/Patient Progress Review  Pt A&Ox4, VSS on 2 L 02 overnight for sats dropping to 85% when sleeping, RA when awake. Pt states 5/10 abd pain, received dilaudid 0.5 mg x3 w/ moderate relief. Q4H S/S BG's 179, 219 overnight. NS @ 150. Pt's phos was 2.3, currently being replaced w/ recheck 4 hours after completion. Pt NPO since midnight for Abd US and ERCP. Will continue to monitor.

## 2018-10-06 NOTE — PLAN OF CARE
Problem: Patient Care Overview  Goal: Plan of Care/Patient Progress Review  Outcome: Improving  A/O, neuros intact. VSS ex temp 100.9 --> 99.4 F, MD aware. Tele SR/ST 90s-110s c PVCs. Given dilaudid IV x2 for abdominal pain with improvement. SBA, steady. Insulin gtt off at 21:12, next , 2 units given per sliding scale. Plan for recheck labs at midnight, BG every 4 hrs, NPO for ultrasound in AM.

## 2018-10-07 ENCOUNTER — ANESTHESIA (OUTPATIENT)
Dept: SURGERY | Facility: CLINIC | Age: 68
DRG: 853 | End: 2018-10-07
Payer: MEDICARE

## 2018-10-07 ENCOUNTER — ANESTHESIA EVENT (OUTPATIENT)
Dept: SURGERY | Facility: CLINIC | Age: 68
DRG: 853 | End: 2018-10-07
Payer: MEDICARE

## 2018-10-07 LAB
ALBUMIN SERPL-MCNC: 2.5 G/DL (ref 3.4–5)
ALBUMIN SERPL-MCNC: 2.5 G/DL (ref 3.4–5)
ALP SERPL-CCNC: 84 U/L (ref 40–150)
ALP SERPL-CCNC: 88 U/L (ref 40–150)
ALT SERPL W P-5'-P-CCNC: 169 U/L (ref 0–70)
ALT SERPL W P-5'-P-CCNC: 170 U/L (ref 0–70)
ANION GAP SERPL CALCULATED.3IONS-SCNC: 7 MMOL/L (ref 3–14)
AST SERPL W P-5'-P-CCNC: 57 U/L (ref 0–45)
AST SERPL W P-5'-P-CCNC: 78 U/L (ref 0–45)
BILIRUB DIRECT SERPL-MCNC: 0.3 MG/DL (ref 0–0.2)
BILIRUB DIRECT SERPL-MCNC: 0.4 MG/DL (ref 0–0.2)
BILIRUB SERPL-MCNC: 0.9 MG/DL (ref 0.2–1.3)
BILIRUB SERPL-MCNC: 0.9 MG/DL (ref 0.2–1.3)
BUN SERPL-MCNC: 13 MG/DL (ref 7–30)
CALCIUM SERPL-MCNC: 7.7 MG/DL (ref 8.5–10.1)
CHLORIDE SERPL-SCNC: 104 MMOL/L (ref 94–109)
CO2 SERPL-SCNC: 26 MMOL/L (ref 20–32)
CREAT SERPL-MCNC: 0.72 MG/DL (ref 0.66–1.25)
ERYTHROCYTE [DISTWIDTH] IN BLOOD BY AUTOMATED COUNT: 13.3 % (ref 10–15)
GFR SERPL CREATININE-BSD FRML MDRD: >90 ML/MIN/1.7M2
GLUCOSE BLDC GLUCOMTR-MCNC: 138 MG/DL (ref 70–99)
GLUCOSE BLDC GLUCOMTR-MCNC: 147 MG/DL (ref 70–99)
GLUCOSE BLDC GLUCOMTR-MCNC: 173 MG/DL (ref 70–99)
GLUCOSE BLDC GLUCOMTR-MCNC: 176 MG/DL (ref 70–99)
GLUCOSE BLDC GLUCOMTR-MCNC: 186 MG/DL (ref 70–99)
GLUCOSE BLDC GLUCOMTR-MCNC: 188 MG/DL (ref 70–99)
GLUCOSE BLDC GLUCOMTR-MCNC: 190 MG/DL (ref 70–99)
GLUCOSE SERPL-MCNC: 163 MG/DL (ref 70–99)
HCT VFR BLD AUTO: 35.4 % (ref 40–53)
HGB BLD-MCNC: 12.1 G/DL (ref 13.3–17.7)
MAGNESIUM SERPL-MCNC: 1.8 MG/DL (ref 1.6–2.3)
MAGNESIUM SERPL-MCNC: 1.8 MG/DL (ref 1.6–2.3)
MCH RBC QN AUTO: 30.9 PG (ref 26.5–33)
MCHC RBC AUTO-ENTMCNC: 34.2 G/DL (ref 31.5–36.5)
MCV RBC AUTO: 91 FL (ref 78–100)
PHOSPHATE SERPL-MCNC: 1.8 MG/DL (ref 2.5–4.5)
PHOSPHATE SERPL-MCNC: 2.1 MG/DL (ref 2.5–4.5)
PLATELET # BLD AUTO: 189 10E9/L (ref 150–450)
POTASSIUM SERPL-SCNC: 4.4 MMOL/L (ref 3.4–5.3)
PROT SERPL-MCNC: 6.2 G/DL (ref 6.8–8.8)
PROT SERPL-MCNC: 6.3 G/DL (ref 6.8–8.8)
RBC # BLD AUTO: 3.91 10E12/L (ref 4.4–5.9)
SODIUM SERPL-SCNC: 137 MMOL/L (ref 133–144)
WBC # BLD AUTO: 11.7 10E9/L (ref 4–11)

## 2018-10-07 PROCEDURE — 88304 TISSUE EXAM BY PATHOLOGIST: CPT | Performed by: SURGERY

## 2018-10-07 PROCEDURE — 80048 BASIC METABOLIC PNL TOTAL CA: CPT | Performed by: HOSPITALIST

## 2018-10-07 PROCEDURE — 25000125 ZZHC RX 250: Performed by: SURGERY

## 2018-10-07 PROCEDURE — 99232 SBSQ HOSP IP/OBS MODERATE 35: CPT | Performed by: STUDENT IN AN ORGANIZED HEALTH CARE EDUCATION/TRAINING PROGRAM

## 2018-10-07 PROCEDURE — 40000170 ZZH STATISTIC PRE-PROCEDURE ASSESSMENT II: Performed by: SURGERY

## 2018-10-07 PROCEDURE — 99204 OFFICE O/P NEW MOD 45 MIN: CPT | Mod: 57 | Performed by: SURGERY

## 2018-10-07 PROCEDURE — 25000128 H RX IP 250 OP 636: Performed by: STUDENT IN AN ORGANIZED HEALTH CARE EDUCATION/TRAINING PROGRAM

## 2018-10-07 PROCEDURE — 25000128 H RX IP 250 OP 636: Performed by: NURSE ANESTHETIST, CERTIFIED REGISTERED

## 2018-10-07 PROCEDURE — 00000146 ZZHCL STATISTIC GLUCOSE BY METER IP

## 2018-10-07 PROCEDURE — 25000125 ZZHC RX 250: Performed by: NURSE ANESTHETIST, CERTIFIED REGISTERED

## 2018-10-07 PROCEDURE — 12000007 ZZH R&B INTERMEDIATE

## 2018-10-07 PROCEDURE — 25000566 ZZH SEVOFLURANE, EA 15 MIN: Performed by: SURGERY

## 2018-10-07 PROCEDURE — 85027 COMPLETE CBC AUTOMATED: CPT | Performed by: STUDENT IN AN ORGANIZED HEALTH CARE EDUCATION/TRAINING PROGRAM

## 2018-10-07 PROCEDURE — 47562 LAPAROSCOPIC CHOLECYSTECTOMY: CPT | Mod: AS | Performed by: PHYSICIAN ASSISTANT

## 2018-10-07 PROCEDURE — 25000128 H RX IP 250 OP 636: Performed by: ANESTHESIOLOGY

## 2018-10-07 PROCEDURE — 36000056 ZZH SURGERY LEVEL 3 1ST 30 MIN: Performed by: SURGERY

## 2018-10-07 PROCEDURE — 36415 COLL VENOUS BLD VENIPUNCTURE: CPT | Performed by: HOSPITALIST

## 2018-10-07 PROCEDURE — 25000128 H RX IP 250 OP 636: Performed by: PHYSICIAN ASSISTANT

## 2018-10-07 PROCEDURE — A9270 NON-COVERED ITEM OR SERVICE: HCPCS | Mod: GY | Performed by: HOSPITALIST

## 2018-10-07 PROCEDURE — 25000132 ZZH RX MED GY IP 250 OP 250 PS 637: Mod: GY | Performed by: PHYSICIAN ASSISTANT

## 2018-10-07 PROCEDURE — 25000132 ZZH RX MED GY IP 250 OP 250 PS 637: Mod: GY | Performed by: HOSPITALIST

## 2018-10-07 PROCEDURE — 37000008 ZZH ANESTHESIA TECHNICAL FEE, 1ST 30 MIN: Performed by: SURGERY

## 2018-10-07 PROCEDURE — 80076 HEPATIC FUNCTION PANEL: CPT | Performed by: HOSPITALIST

## 2018-10-07 PROCEDURE — 80076 HEPATIC FUNCTION PANEL: CPT | Performed by: STUDENT IN AN ORGANIZED HEALTH CARE EDUCATION/TRAINING PROGRAM

## 2018-10-07 PROCEDURE — 0FT44ZZ RESECTION OF GALLBLADDER, PERCUTANEOUS ENDOSCOPIC APPROACH: ICD-10-PCS | Performed by: SURGERY

## 2018-10-07 PROCEDURE — 37000009 ZZH ANESTHESIA TECHNICAL FEE, EACH ADDTL 15 MIN: Performed by: SURGERY

## 2018-10-07 PROCEDURE — 36000058 ZZH SURGERY LEVEL 3 EA 15 ADDTL MIN: Performed by: SURGERY

## 2018-10-07 PROCEDURE — 83735 ASSAY OF MAGNESIUM: CPT | Performed by: STUDENT IN AN ORGANIZED HEALTH CARE EDUCATION/TRAINING PROGRAM

## 2018-10-07 PROCEDURE — 84100 ASSAY OF PHOSPHORUS: CPT | Performed by: HOSPITALIST

## 2018-10-07 PROCEDURE — 25000131 ZZH RX MED GY IP 250 OP 636 PS 637: Mod: GY | Performed by: STUDENT IN AN ORGANIZED HEALTH CARE EDUCATION/TRAINING PROGRAM

## 2018-10-07 PROCEDURE — 36415 COLL VENOUS BLD VENIPUNCTURE: CPT | Performed by: STUDENT IN AN ORGANIZED HEALTH CARE EDUCATION/TRAINING PROGRAM

## 2018-10-07 PROCEDURE — 83735 ASSAY OF MAGNESIUM: CPT | Performed by: HOSPITALIST

## 2018-10-07 PROCEDURE — 88304 TISSUE EXAM BY PATHOLOGIST: CPT | Mod: 26 | Performed by: SURGERY

## 2018-10-07 PROCEDURE — 71000012 ZZH RECOVERY PHASE 1 LEVEL 1 FIRST HR: Performed by: SURGERY

## 2018-10-07 PROCEDURE — 25000125 ZZHC RX 250: Performed by: PHYSICIAN ASSISTANT

## 2018-10-07 PROCEDURE — 27210794 ZZH OR GENERAL SUPPLY STERILE: Performed by: SURGERY

## 2018-10-07 PROCEDURE — 84100 ASSAY OF PHOSPHORUS: CPT | Performed by: STUDENT IN AN ORGANIZED HEALTH CARE EDUCATION/TRAINING PROGRAM

## 2018-10-07 PROCEDURE — A9270 NON-COVERED ITEM OR SERVICE: HCPCS | Mod: GY | Performed by: PHYSICIAN ASSISTANT

## 2018-10-07 PROCEDURE — 47562 LAPAROSCOPIC CHOLECYSTECTOMY: CPT | Performed by: SURGERY

## 2018-10-07 RX ORDER — NEOSTIGMINE METHYLSULFATE 1 MG/ML
VIAL (ML) INJECTION PRN
Status: DISCONTINUED | OUTPATIENT
Start: 2018-10-07 | End: 2018-10-07

## 2018-10-07 RX ORDER — ONDANSETRON 4 MG/1
4 TABLET, ORALLY DISINTEGRATING ORAL EVERY 30 MIN PRN
Status: DISCONTINUED | OUTPATIENT
Start: 2018-10-07 | End: 2018-10-07 | Stop reason: HOSPADM

## 2018-10-07 RX ORDER — FENTANYL CITRATE 50 UG/ML
25-50 INJECTION, SOLUTION INTRAMUSCULAR; INTRAVENOUS
Status: DISCONTINUED | OUTPATIENT
Start: 2018-10-07 | End: 2018-10-07 | Stop reason: HOSPADM

## 2018-10-07 RX ORDER — NALOXONE HYDROCHLORIDE 0.4 MG/ML
.1-.4 INJECTION, SOLUTION INTRAMUSCULAR; INTRAVENOUS; SUBCUTANEOUS
Status: DISCONTINUED | OUTPATIENT
Start: 2018-10-07 | End: 2018-10-07

## 2018-10-07 RX ORDER — PROPOFOL 10 MG/ML
INJECTION, EMULSION INTRAVENOUS PRN
Status: DISCONTINUED | OUTPATIENT
Start: 2018-10-07 | End: 2018-10-07

## 2018-10-07 RX ORDER — LIDOCAINE HYDROCHLORIDE 20 MG/ML
INJECTION, SOLUTION INFILTRATION; PERINEURAL PRN
Status: DISCONTINUED | OUTPATIENT
Start: 2018-10-07 | End: 2018-10-07

## 2018-10-07 RX ORDER — BUPIVACAINE HYDROCHLORIDE AND EPINEPHRINE 2.5; 5 MG/ML; UG/ML
INJECTION, SOLUTION INFILTRATION; PERINEURAL PRN
Status: DISCONTINUED | OUTPATIENT
Start: 2018-10-07 | End: 2018-10-07 | Stop reason: HOSPADM

## 2018-10-07 RX ORDER — SODIUM CHLORIDE, SODIUM LACTATE, POTASSIUM CHLORIDE, CALCIUM CHLORIDE 600; 310; 30; 20 MG/100ML; MG/100ML; MG/100ML; MG/100ML
INJECTION, SOLUTION INTRAVENOUS CONTINUOUS
Status: DISCONTINUED | OUTPATIENT
Start: 2018-10-07 | End: 2018-10-07 | Stop reason: HOSPADM

## 2018-10-07 RX ORDER — HYDROMORPHONE HYDROCHLORIDE 1 MG/ML
.3-.5 INJECTION, SOLUTION INTRAMUSCULAR; INTRAVENOUS; SUBCUTANEOUS EVERY 5 MIN PRN
Status: DISCONTINUED | OUTPATIENT
Start: 2018-10-07 | End: 2018-10-07 | Stop reason: HOSPADM

## 2018-10-07 RX ORDER — LABETALOL HYDROCHLORIDE 5 MG/ML
INJECTION, SOLUTION INTRAVENOUS PRN
Status: DISCONTINUED | OUTPATIENT
Start: 2018-10-07 | End: 2018-10-07

## 2018-10-07 RX ORDER — ONDANSETRON 2 MG/ML
4 INJECTION INTRAMUSCULAR; INTRAVENOUS EVERY 30 MIN PRN
Status: DISCONTINUED | OUTPATIENT
Start: 2018-10-07 | End: 2018-10-07 | Stop reason: HOSPADM

## 2018-10-07 RX ORDER — NALOXONE HYDROCHLORIDE 0.4 MG/ML
.1-.4 INJECTION, SOLUTION INTRAMUSCULAR; INTRAVENOUS; SUBCUTANEOUS
Status: CANCELLED | OUTPATIENT
Start: 2018-10-07 | End: 2018-10-08

## 2018-10-07 RX ORDER — GLYCOPYRROLATE 0.2 MG/ML
INJECTION, SOLUTION INTRAMUSCULAR; INTRAVENOUS PRN
Status: DISCONTINUED | OUTPATIENT
Start: 2018-10-07 | End: 2018-10-07

## 2018-10-07 RX ORDER — OXYCODONE HYDROCHLORIDE 5 MG/1
5-10 TABLET ORAL EVERY 4 HOURS PRN
Status: DISCONTINUED | OUTPATIENT
Start: 2018-10-07 | End: 2018-10-08 | Stop reason: HOSPADM

## 2018-10-07 RX ORDER — MAGNESIUM HYDROXIDE 1200 MG/15ML
LIQUID ORAL PRN
Status: DISCONTINUED | OUTPATIENT
Start: 2018-10-07 | End: 2018-10-07 | Stop reason: HOSPADM

## 2018-10-07 RX ORDER — DEXAMETHASONE SODIUM PHOSPHATE 4 MG/ML
INJECTION, SOLUTION INTRA-ARTICULAR; INTRALESIONAL; INTRAMUSCULAR; INTRAVENOUS; SOFT TISSUE PRN
Status: DISCONTINUED | OUTPATIENT
Start: 2018-10-07 | End: 2018-10-07

## 2018-10-07 RX ORDER — FENTANYL CITRATE 50 UG/ML
INJECTION, SOLUTION INTRAMUSCULAR; INTRAVENOUS PRN
Status: DISCONTINUED | OUTPATIENT
Start: 2018-10-07 | End: 2018-10-07

## 2018-10-07 RX ADMIN — PROPOFOL 150 MG: 10 INJECTION, EMULSION INTRAVENOUS at 14:05

## 2018-10-07 RX ADMIN — NEOSTIGMINE METHYLSULFATE 3 MG: 1 INJECTION, SOLUTION INTRAVENOUS at 15:15

## 2018-10-07 RX ADMIN — Medication 0.5 MG: at 01:51

## 2018-10-07 RX ADMIN — LABETALOL HYDROCHLORIDE 10 MG: 5 INJECTION INTRAVENOUS at 14:41

## 2018-10-07 RX ADMIN — FENTANYL CITRATE 100 MCG: 50 INJECTION, SOLUTION INTRAMUSCULAR; INTRAVENOUS at 13:55

## 2018-10-07 RX ADMIN — PROPOFOL 50 MG: 10 INJECTION, EMULSION INTRAVENOUS at 14:35

## 2018-10-07 RX ADMIN — ROCURONIUM BROMIDE 40 MG: 10 INJECTION INTRAVENOUS at 14:05

## 2018-10-07 RX ADMIN — Medication 0.5 MG: at 08:26

## 2018-10-07 RX ADMIN — INSULIN ASPART 1 UNITS: 100 INJECTION, SOLUTION INTRAVENOUS; SUBCUTANEOUS at 05:38

## 2018-10-07 RX ADMIN — GABAPENTIN 900 MG: 300 CAPSULE ORAL at 20:37

## 2018-10-07 RX ADMIN — Medication 0.5 MG: at 12:51

## 2018-10-07 RX ADMIN — SODIUM CHLORIDE: 9 INJECTION, SOLUTION INTRAVENOUS at 17:35

## 2018-10-07 RX ADMIN — ONDANSETRON 4 MG: 2 INJECTION INTRAMUSCULAR; INTRAVENOUS at 08:26

## 2018-10-07 RX ADMIN — FENTANYL CITRATE 50 MCG: 50 INJECTION, SOLUTION INTRAMUSCULAR; INTRAVENOUS at 14:35

## 2018-10-07 RX ADMIN — SODIUM CHLORIDE: 9 INJECTION, SOLUTION INTRAVENOUS at 06:41

## 2018-10-07 RX ADMIN — GLYCOPYRROLATE 0.4 MG: 0.2 INJECTION, SOLUTION INTRAMUSCULAR; INTRAVENOUS at 15:15

## 2018-10-07 RX ADMIN — GABAPENTIN 600 MG: 600 TABLET, FILM COATED ORAL at 08:38

## 2018-10-07 RX ADMIN — INSULIN GLARGINE 25 UNITS: 100 INJECTION, SOLUTION SUBCUTANEOUS at 22:47

## 2018-10-07 RX ADMIN — Medication 0.5 MG: at 16:15

## 2018-10-07 RX ADMIN — POTASSIUM PHOSPHATE, MONOBASIC AND POTASSIUM PHOSPHATE, DIBASIC 20 MMOL: 224; 236 INJECTION, SOLUTION INTRAVENOUS at 09:47

## 2018-10-07 RX ADMIN — DEXAMETHASONE SODIUM PHOSPHATE 4 MG: 4 INJECTION, SOLUTION INTRA-ARTICULAR; INTRALESIONAL; INTRAMUSCULAR; INTRAVENOUS; SOFT TISSUE at 14:20

## 2018-10-07 RX ADMIN — PIPERACILLIN SODIUM,TAZOBACTAM SODIUM 3.38 G: 3; .375 INJECTION, POWDER, FOR SOLUTION INTRAVENOUS at 05:35

## 2018-10-07 RX ADMIN — METOPROLOL SUCCINATE 200 MG: 100 TABLET, EXTENDED RELEASE ORAL at 08:38

## 2018-10-07 RX ADMIN — SODIUM CHLORIDE, POTASSIUM CHLORIDE, SODIUM LACTATE AND CALCIUM CHLORIDE: 600; 310; 30; 20 INJECTION, SOLUTION INTRAVENOUS at 12:45

## 2018-10-07 RX ADMIN — PIPERACILLIN SODIUM,TAZOBACTAM SODIUM 3.38 G: 3; .375 INJECTION, POWDER, FOR SOLUTION INTRAVENOUS at 17:31

## 2018-10-07 RX ADMIN — PIPERACILLIN SODIUM,TAZOBACTAM SODIUM 3.38 G: 3; .375 INJECTION, POWDER, FOR SOLUTION INTRAVENOUS at 11:40

## 2018-10-07 RX ADMIN — ONDANSETRON 4 MG: 2 INJECTION INTRAMUSCULAR; INTRAVENOUS at 16:05

## 2018-10-07 RX ADMIN — INSULIN ASPART 1 UNITS: 100 INJECTION, SOLUTION INTRAVENOUS; SUBCUTANEOUS at 01:51

## 2018-10-07 RX ADMIN — Medication 0.5 MG: at 17:32

## 2018-10-07 RX ADMIN — FENTANYL CITRATE 50 MCG: 50 INJECTION, SOLUTION INTRAMUSCULAR; INTRAVENOUS at 15:19

## 2018-10-07 RX ADMIN — Medication 0.5 MG: at 05:19

## 2018-10-07 RX ADMIN — LIDOCAINE HYDROCHLORIDE 100 MG: 20 INJECTION, SOLUTION INFILTRATION; PERINEURAL at 14:05

## 2018-10-07 ASSESSMENT — ACTIVITIES OF DAILY LIVING (ADL): ADLS_ACUITY_SCORE: 9

## 2018-10-07 ASSESSMENT — ENCOUNTER SYMPTOMS: DYSRHYTHMIAS: 1

## 2018-10-07 ASSESSMENT — PAIN DESCRIPTION - DESCRIPTORS
DESCRIPTORS: ACHING
DESCRIPTORS: SHARP

## 2018-10-07 NOTE — OP NOTE
General Surgery Operative Note    PREOPERATIVE DIAGNOSIS: Acute cholecystitis, choledocholithiasis    POSTOPERATIVE DIAGNOSIS: Chronic cholecystitis, cholelithiasis, choledocholithiasis    PROCEDURE:   Procedure(s):  LAPAROSCOPIC CHOLECYSTECTOMY    ANESTHESIA:  General.    PREOPERATIVE MEDICATIONS:  Ancef IV.    SURGEON:  Dhiraj Matute MD    ASSISTANT:  Macie Waggoner PA-C  A first assistant was necessary owing to challenging laparoscopic visualization and exposure.  Retraction was also necessary.    INDICATIONS: Patient was admitted to the hospital with upper abdominal pain, sepsis, and probable cholangitis.  He underwent ERCP with removal of biliary stones and sludge.  Now presents for cholecystectomy.    PROCEDURE:  The patient was taken to the operating suite and uneventfully endotracheally intubated.  The abdomen was prepped and draped in a sterile fashion.  Surgeon initiated timeout was acknowledged.  We entered the abdomen in the left upper quadrant using Visiport technique.  Three other trocars were placed under laparoscopic visualization.  We elevated the liver and were able to identify a very small, contracted gallbladder.  It was full of stones.  The gallbladder was grasped and used to elevate the liver further.  We began dissecting out some fatty adhesions down near the neck of the gallbladder until a cystic duct was encountered.  We continued our dissection using combination of sharp and blunt dissection until the cystic duct was largely dissected out.  We continued our dissection up along the sides of the gallbladder, both medially and laterally, until we had created a space between the gallbladder and the liver.  At this point, we encountered the cystic artery, just posterior and lateral to the cystic duct.  This again was dissected out.  Once we had created a window where only the cystic artery and duct were noted to be entering the gallbladder, we felt that this represented our critical view.  The  cystic artery and duct were then doubly clipped and divided.  The duct was on the larger side and the clips were just barely large enough.  We then elevated the cystic duct stump and placed an Endoloop around it.  We continued our dissection up along the body of the gallbladder, freeing all attachments and adhesions of the gallbladder to the liver.  Gallbladder was removed from the liver in an atraumatic fashion.  The gallbladder was then brought up through the umbilical port site and removed from the abdomen.  The gallbladder fossa was reinspected, and all areas of bleeding were managed with electrocautery.  We irrigated the area with normal saline and aspirated it out.  We then removed the umbilical port trocar and closed the fascia with a figure-of-eight 0 Vicryl suture.  This was done using the Franco-Luciana device.  We then reinspected the abdomen, and everything appeared to be in pristine condition.  We removed the trocars under laparoscopic visualization and desufflated the abdomen with the Valerie suction .  The skin edges were reapproximated with 4-0 Vicryl and Steri-Strips.  The patient was uneventfully extubated, awakened and taken to the PACU in stable condition.  At the conclusion of the case, all lap and needle counts were correct.      ESTIMATED BLOOD LOSS: 25 mL    INTRAOPERATIVE FINDINGS: Contracted thick-walled gallbladder full of stones    Dhiraj Matute MD, MD

## 2018-10-07 NOTE — BRIEF OP NOTE
General Surgery Brief Operative Note    Pre-operative diagnosis: Cholelithiasis   Post-operative diagnosis Same   Procedure: Procedure(s):   Laparoscopic Cholecystectomy - Wound Class: II-Clean Contaminated    Surgeon(s), Assistant(s): Surgeon(s) and Role:     * Dhiraj Matute MD - Primary     * Macie Waggoner PA-C - Assisting   Estimated blood loss: 20 mL   Drains: None   Specimens:   ID Type Source Tests Collected by Time Destination   A :  Tissue Gallbladder and Contents SURGICAL PATHOLOGY EXAM Dhiraj Matute MD 10/7/2018  3:02 PM       Findings: See postop diagnosis   Condition: Stable   Comments:      Macie Waggoner PA-C See dictated operative report for full details

## 2018-10-07 NOTE — CONSULTS
Surgery Consultation, [facility]         Dhiraj Matute MD    Go Saavedra MRN# [id number]   YOB: 1950 Age: 68 year old     PCP:  Edwina Rodriguez [phone]    Chief Complaint:  Right upper quadrant pain, nausea    History of Present Illness:  Go Saavedra is a 68 year old male who presented with several episodes of upper abdominal pain and intermittent nausea, usually after eating.  Commonly associated with eating greasy foods.  Was most recently seen through the emergency department where he presented with fevers and lab work consistent with cholangitis versus gallstone pancreatitis.  Underwent ERCP yesterday where purulent discharge and stones were swept from the common duct.  Patient was also noted to have multiple stones within the gallbladder.  We are asked to evaluate the patient for consideration of cholecystectomy.  Of note, at the time of admission patient was diagnosed with and treated for diabetic ketoacidosis.     PMH:  Go Saavedra  has a past medical history of Coronary artery disease; Diabetes mellitus (H); History of coronary artery bypass graft x 3 (2008); Hyperlipidemia; Ischemic cardiomyopathy (2008); MI (myocardial infarction) (H) (11/2008); and Ventricular tachycardia (H).  PSH:  Go Saavedra  has a past surgical history that includes Implant implantable cardioverter defibrillator (11/2008); Bypass graft artery coronary (11/2008); and LEFT HEART CATHETERIZATION (10/2008).    Home medications and allergies reviewed.    Social History:  Go Saavedra  reports that he has never smoked. He has never used smokeless tobacco. He reports that he does not drink alcohol.  Family History:  Go Saavedra family history includes Cancer in his brother. There is no history of Diabetes or Hypertension.    ROS:  The 10 point Review of Systems is negative other than noted in the HPI.  Low-grade fevers on admission, now regressed.  Nausea  "without emesis.  No recent weight loss or weight gain..    Physical Exam:  Blood pressure 132/69, temperature 99.5  F (37.5  C), temperature source Oral, resp. rate 14, height 1.727 m (5' 8\"), weight 116.8 kg (257 lb 9.6 oz), SpO2 97 %.  257 lbs 9.6 oz  Pleasant overweight gentleman in no distress.  Patient has a pleasant affect, speaks without difficulty.   Pupils equal round and reactive to light.   No cervical lymphadenopathy or thyromegaly.   Lung fields clear, breathing comfortably.   Heart normal sinus rhythm.  No murmurs rubs or gallops.  Well-healed sternotomy scar.  Abdomen soft, nontender, nondistended.  Minimal tenderness in the right upper quadrant, no masses appreciated. No peritoneal signs or rebound.  Skin warm, dry, without rashes or lesions.  Not visibly jaundiced.    All new lab and imaging data was reviewed.  Abdominal ultrasound shows gallbladder with stones, mild wall thickening.  Initial imaging suggested dilated common bile duct with choledocholithiasis.     Assessment/plan:  Go Saavedra is a 68 year old male with signs and symptoms suggesting acute cholecystitis with cholangitis and choledocholithiasis.  He is clinically improved per his ERCP.  I explained that the stones in his gallbladder represent the potential for recurrent episodes of cholecystitis and cholangitis and recommended cholecystectomy during this hospital admission.  He was eager to do this to complete his treatment during this hospital stay.  The risks and benefits of the surgery were explained to the patient.  We will get him on the surgery schedule today.  He should be able to eat later today and potentially discharge in the next 1-2 days.    Surgical comorbidities include coronary artery disease, sepsis, cholangitis, ischemic cardiomyopathy.    Giuseppe Matute M.D.  [facility]  [phone]    Please route or send letter to:  Primary Care Provider (PCP) and Referring Provider  "

## 2018-10-07 NOTE — OR NURSING
1558hr - [facility] Micheal rounded on pt - pt awake and interactive w/MDA. VS & Surgical sites are stable.  Okay for pt to transfer back to floor when appropriate.

## 2018-10-07 NOTE — PROGRESS NOTES
Hospitalist Progress Note    Date of Service: 10/07/2018         Assessment and Plan:       Go Saavedra is a pleasant 68-year-old  male with history of HLD, HTN, NSVT and ischemic cardiomyopathy status post AICD, CAD status post 3-vessel CABG, and type 2 diabetes, who presented to the Emergency Department with nausea, vomiting and right upper quadrant abdominal pain x 2 days admitted to [facility] on 10/05/2018 for severe sepsis secondary to acute pancreatitis and DKA.    Severe sepsis secondary to suspected acute gallstone pancreatitis.  Assessment: Elevated LFTs and total bili 4.0 on admission, lipase around 1100.  WBC 19.6, tachycardic and lactic acid elevated at 4.4 on admission.  Treated with 3.5 normal saline liter bolus and Zosyn in the Emergency Department.  CT of chest, abdomen and pelvis suggests mild pancreatitis with subtle induration of the peripancreatic fat. Received additional 1 liter IV fluid bolus for DKA . ERCP showed Choledocholithiasis was found with pus present. Complete removal was accomplished by biliary sphincterotomy and balloon extraction. On Zosyn for likely cholangitis. BC NGTD  Plan:  -- Continues on Zosyn antibiotic due to ascending cholangitis.   -- Clear liquid diet  -- PRN analgesics and antiemetics available.   -- Monitor vitals/temp  -- General Surgery consult pending  -- If having recurrent fevers over next 24-48 hours and pain not improving     DKA.  Type 2 diabetes with peripheral neuropathy.  Assessment: Precipitated by above and holding diabetic medications x 2 days due to not eating.  Anion gap 16, ketones 1.7, lactic acid 4.4, blood glucose 360, WBC 20.  Started on an insulin drip and received 3.5 liter normal saline IV fluid bolus as above in the Emergency Department.  [PTA: Lantus 50u QAM, Novolog 26u TID AC, metformin 500mg with dinner, gabapentin 600mg QAM and 900mg QHS]. Insulin gtt stopped, transitioned to subcutaneous insulin with  no complications.  Assessment:   -- Lantus 25 U evening. Titrate based on trend over next 24 hours  -- IV fluids per protocol.    -- Follow BG  -- Hypoglycemia protocol     CAD status post 3v-CABG (2008).   Ischemic cardiomyopathy and history NSVT status post AICD (2008).  EF 50% (01/2018).   Assessment: Denies recent chest pain, shortness of breath, dyspnea on exertion, orthopnea, lower extremity edema.   Plan:   --Hold prior to admission aspirin   -- Continue prior to admission Toprol 200 mg daily with hold parameters in place.   -- Hold PTA spironolactone and lisinopril.  -- Daily weights and I&Os.  -- Monitor for signs fluid overload with aggressive fluids as above and underlying cardiac history.     Elevated D-dimer.  CT PE study negative for PE.      Active Diet Order      NPO for Medical/Clinical Reasons Except for: Meds, Ice Chips    DVT Prophylaxis: Pneumatic Compression Devices  Code Status: Prior    Disposition: Expected discharge pending gen surg consult    Attestation:   I have reviewed today's relevant vital signs, notes, medications, labs and imaging.I personally reviewed the RUQ US image(s) showing see below.  IMPRESSION:    1. Limited evaluation of the gallbladder. Contracted gallbladder with  possible gallstones.  2. No evidence of biliary dilatation.  3. Fatty infiltration of the liver.     Otoniel Pandya MD  [facility]  Text Page  (7am - 6pm)        Interval History:        Pain 7/10 this am, epigastric  Fever overnight, still with low grade temp  Mild nausea/no vomiting  No CP/SOB  No new physical complaints         Physical Exam:        Physical Exam   Temp: 99.5  F (37.5  C) Temp src: Oral BP: 132/60   Heart Rate: 69 Resp: 16 SpO2: 95 % O2 Device: None (Room air) Oxygen Delivery: 2 LPM  Vitals:    10/05/18 1534 10/06/18 0707 10/07/18 0600   Weight: 115.6 kg (254 lb 13.6 oz) 115.5 kg (254 lb 11.2 oz) 116.8 kg (257 lb 9.6 oz)     Vital Signs with Ranges  Temp:  [96.5  F (35.8   C)-101.7  F (38.7  C)] 99.5  F (37.5  C)  Heart Rate:  [66-90] 69  Resp:  [9-26] 16  BP: (101-194)/(40-96) 132/60  SpO2:  [81 %-99 %] 95 %  I/O last 3 completed shifts:  In: 1200 [P.O.:700; I.V.:500]  Out: 375 [Urine:375]    Constitutional: Awake, alert, cooperative, no apparent distress.   Respiratory: Clear to auscultation bilaterally, no crackles or wheezing  HEENT: atraumatic. No yellowing of eyes  Neck: supple, full ROM  Cardiovascular: Regular rate and rhythm, normal S1 and S2, and no murmur noted  GI: Normal bowel sounds, soft, non-distended, epigastric tenderness, No HSM  Skin/Integumen: No rashes, no cyanosis, no edema  Other: Normal mood and affect  Neuro: A/Ox3. Moving all extremities    # Pain Assessment:  Current Pain Score 10/7/2018   Patient currently in pain? no   Pain score (0-10) -   Pain location -   Pain descriptors -   - Go is experiencing pain due to pancreatitis. Pain management was discussed and the plan was created in a collaborative fashion.  Go's response to the current recommendations: engaged  - Please see the plan for pain management as documented above         Data:     CBC RESULTS:    Recent Labs  Lab 10/06/18  1326 10/06/18  0546 10/05/18  1555 10/05/18  1140   WBC  --  16.3* 14.2* 19.6*   RBC  --  4.14* 4.31* 4.90   HGB  --  12.6* 13.4 15.5   HCT  --  37.5* 38.4* 43.8    157 189 248       BASIC METABOLIC PANEL:    Recent Labs  Lab 10/07/18  0515 10/06/18  1322 10/06/18  0546 10/06/18  0055 10/05/18  1806 10/05/18  1555    137 136 138 140 137   POTASSIUM 4.4 4.9 4.7 4.7 4.0 4.2   CHLORIDE 104 107 105 106 107 106   CO2 26 22 23 26 26 23   BUN 13 14 13 14 15 15   CR 0.72 0.74 0.71 0.78 0.84 0.85   * 184* 195* 187* 193* 268*   CLAIR 7.7* 8.0* 8.0* 8.1* 8.5 8.4*       HEPATIC FUNCTION PANEL    Recent Labs  Lab 10/07/18  0515 10/06/18  0546 10/05/18  1806   AST 57* 117* 143*   * 267* 347*   ALKPHOS 84 103 122   BILITOTAL 0.9 1.5* 1.8*     INR  No  results for input(s): INR in the last 168 hours.    OTHER LABS    None    Medications     - MEDICATION INSTRUCTIONS -       sodium chloride 100 mL/hr at 10/07/18 0641       gabapentin  900 mg Oral QPM     gabapentin (NEURONTIN) tablet 600 mg  600 mg Oral QAM     insulin aspart  1-6 Units Subcutaneous Q4H     insulin glargine  25 Units Subcutaneous At Bedtime     metoprolol succinate  200 mg Oral Daily     piperacillin-tazobactam  3.375 g Intravenous Q6H     sodium chloride (PF)  3 mL Intracatheter Q8H         Recent Results (from the past 24 hour(s))   US Abdomen Limited    Narrative    RIGHT UPPER QUADRANT ULTRASOUND 10/6/2018 8:28 AM    HISTORY:  Evaluate biliary tree. Probable gallstone pancreatitis,  epigastric pain, jaundice.     COMPARISON: None.    FINDINGS:    Gallbladder: Contracted gallbladder which significantly limits  evaluation. There is shadowing extending from the gallbladder which  may be due to gallstones.    Bile ducts:   CHD is normal diameter.  No intrahepatic biliary  dilatation.    Liver:  Fatty infiltration. No focal hepatic masses are seen.    Pancreas:  Partially obscured. Visualized portions are unremarkable.    Right kidney:   Normal.       Impression    IMPRESSION:    1. Limited evaluation of the gallbladder. Contracted gallbladder with  possible gallstones.  2. No evidence of biliary dilatation.  3. Fatty infiltration of the liver.    MARIANN FALLON MD   XR Surgery ROSE Fluoro L/T 5 Min w Stills    Narrative    FLUOROSCOPY FOR ERCP   10/6/2018 12:44 PM    HISTORY: ERCP.    COMPARISON: None.    NUMBER OF IMAGES ACQUIRED: 3.    VIEWS: 1.    FLUOROSCOPY TIME: 1.5 minutes.      Impression    IMPRESSION: Images demonstrate an ERCP with contrast seen in the  common bile duct. No abnormality is identified.

## 2018-10-07 NOTE — OR NURSING
1637hr - Pt now transferred to flr.   No postOp Pacer interrogation required per CARLO Burton when he rounded on pt earlier on.

## 2018-10-07 NOTE — ANESTHESIA CARE TRANSFER NOTE
Patient: Go Saavedra    Procedure(s):   Laparoscopic Cholecystectomy - Wound Class: II-Clean Contaminated    Diagnosis: unknown  Diagnosis Additional Information: No value filed.    Anesthesia Type:   General, ETT     Note:  Airway :Nasal Cannula  Patient transferred to:PACU  Comments: BP: 151/86     Resp: 18  SpO2: 97 %  Temp: 37.1  C (98.7  F)    TOF 4/4 with sustained tetany > 5secs. Spontaneous resp with tidal volume >400ml.. Followed commands et purposeful. Strong tongue thrust. Extubated with cuff down. Pt maintains resp. O2 sat > 97%. To PACU: VSS, placed on monitors with alarms on. Report given to RN.; Handoff Report: Identifed the Patient, Identified the Reponsible Provider, Reviewed the pertinent medical history, Discussed the surgical course, Reviewed Intra-OP anesthesia mangement and issues during anesthesia, Set expectations for post-procedure period and Allowed opportunity for questions and acknowledgement of understanding      Vitals: (Last set prior to Anesthesia Care Transfer)    CRNA VITALS  10/7/2018 1459 - 10/7/2018 1534      10/7/2018             Pulse: 90    SpO2: 99 %    Resp Rate (observed): 24                Electronically Signed By: MACY Mcintyre CRNA  October 7, 2018  3:34 PM

## 2018-10-07 NOTE — ANESTHESIA PREPROCEDURE EVALUATION
Anesthesia Evaluation     .             ROS/MED HX    ENT/Pulmonary:       Neurologic:       Cardiovascular:     (+) Dyslipidemia, hypertension--CAD, -past MI,CABG-date: 11/2008, . : . . . :ICD Reason placed:VT  type;Dual lead [employer] (RA, RV) . dysrhythmias Other, Irregular Heartbeat/Palpitations, .       METS/Exercise Tolerance:     Hematologic:         Musculoskeletal:         GI/Hepatic:         Renal/Genitourinary:         Endo:     (+) type II DM Diabetic complications: neuropathy, .      Psychiatric:         Infectious Disease:         Malignancy:         Other:                     Physical Exam  Normal systems: cardiovascular, pulmonary and dental    Airway   Mallampati: II  TM distance: >3 FB  Neck ROM: full    Dental     Cardiovascular       Pulmonary                         Anesthesia Plan      History & Physical Review  History and physical reviewed and following examination; no interval change.    ASA Status:  3 .        Plan for General and ETT with Intravenous induction.   PONV prophylaxis:  Ondansetron (or other 5HT-3)  Additional equipment: Videolaryngoscope Glidescope      Postoperative Care      Consents  Anesthetic plan, risks, benefits and alternatives discussed with:  Patient..                          .

## 2018-10-07 NOTE — SIGNIFICANT EVENT
Called for fever  Pt is s/p ERCP  Taking orals  Already on zosyn for possible ascending cholangitis  Could be related to procedure  Start tylenol prn fever, continue zosyn  Discussed with nursing

## 2018-10-07 NOTE — OR NURSING
OK to use magnet over ICD / pacemaker during surgery.  Remove when done, no further interrogation needed post op per Jameson Laws

## 2018-10-08 VITALS
OXYGEN SATURATION: 94 % | WEIGHT: 257.94 LBS | TEMPERATURE: 98.2 F | DIASTOLIC BLOOD PRESSURE: 88 MMHG | BODY MASS INDEX: 39.09 KG/M2 | SYSTOLIC BLOOD PRESSURE: 166 MMHG | HEART RATE: 79 BPM | HEIGHT: 68 IN | RESPIRATION RATE: 16 BRPM

## 2018-10-08 LAB
ALBUMIN SERPL-MCNC: 2.7 G/DL (ref 3.4–5)
ALP SERPL-CCNC: 90 U/L (ref 40–150)
ALT SERPL W P-5'-P-CCNC: 166 U/L (ref 0–70)
ANION GAP SERPL CALCULATED.3IONS-SCNC: 9 MMOL/L (ref 3–14)
AST SERPL W P-5'-P-CCNC: 67 U/L (ref 0–45)
BILIRUB DIRECT SERPL-MCNC: 0.2 MG/DL (ref 0–0.2)
BILIRUB SERPL-MCNC: 0.7 MG/DL (ref 0.2–1.3)
BUN SERPL-MCNC: 12 MG/DL (ref 7–30)
CALCIUM SERPL-MCNC: 8.3 MG/DL (ref 8.5–10.1)
CHLORIDE SERPL-SCNC: 104 MMOL/L (ref 94–109)
CO2 SERPL-SCNC: 24 MMOL/L (ref 20–32)
CREAT SERPL-MCNC: 0.71 MG/DL (ref 0.66–1.25)
ERYTHROCYTE [DISTWIDTH] IN BLOOD BY AUTOMATED COUNT: 13.4 % (ref 10–15)
GFR SERPL CREATININE-BSD FRML MDRD: >90 ML/MIN/1.7M2
GLUCOSE BLDC GLUCOMTR-MCNC: 163 MG/DL (ref 70–99)
GLUCOSE BLDC GLUCOMTR-MCNC: 174 MG/DL (ref 70–99)
GLUCOSE BLDC GLUCOMTR-MCNC: 174 MG/DL (ref 70–99)
GLUCOSE BLDC GLUCOMTR-MCNC: 183 MG/DL (ref 70–99)
GLUCOSE BLDC GLUCOMTR-MCNC: 215 MG/DL (ref 70–99)
GLUCOSE SERPL-MCNC: 187 MG/DL (ref 70–99)
HCT VFR BLD AUTO: 37.7 % (ref 40–53)
HGB BLD-MCNC: 12.8 G/DL (ref 13.3–17.7)
MCH RBC QN AUTO: 30.8 PG (ref 26.5–33)
MCHC RBC AUTO-ENTMCNC: 34 G/DL (ref 31.5–36.5)
MCV RBC AUTO: 91 FL (ref 78–100)
PHOSPHATE SERPL-MCNC: 1.9 MG/DL (ref 2.5–4.5)
PLATELET # BLD AUTO: 255 10E9/L (ref 150–450)
POTASSIUM SERPL-SCNC: 4.2 MMOL/L (ref 3.4–5.3)
PROT SERPL-MCNC: 7 G/DL (ref 6.8–8.8)
RBC # BLD AUTO: 4.15 10E12/L (ref 4.4–5.9)
SODIUM SERPL-SCNC: 137 MMOL/L (ref 133–144)
WBC # BLD AUTO: 12.4 10E9/L (ref 4–11)

## 2018-10-08 PROCEDURE — 25000125 ZZHC RX 250: Performed by: PHYSICIAN ASSISTANT

## 2018-10-08 PROCEDURE — 36415 COLL VENOUS BLD VENIPUNCTURE: CPT | Performed by: PHYSICIAN ASSISTANT

## 2018-10-08 PROCEDURE — 25000132 ZZH RX MED GY IP 250 OP 250 PS 637: Mod: GY | Performed by: STUDENT IN AN ORGANIZED HEALTH CARE EDUCATION/TRAINING PROGRAM

## 2018-10-08 PROCEDURE — 84100 ASSAY OF PHOSPHORUS: CPT | Performed by: PHYSICIAN ASSISTANT

## 2018-10-08 PROCEDURE — A9270 NON-COVERED ITEM OR SERVICE: HCPCS | Mod: GY | Performed by: STUDENT IN AN ORGANIZED HEALTH CARE EDUCATION/TRAINING PROGRAM

## 2018-10-08 PROCEDURE — A9270 NON-COVERED ITEM OR SERVICE: HCPCS | Mod: GY | Performed by: HOSPITALIST

## 2018-10-08 PROCEDURE — 99239 HOSP IP/OBS DSCHRG MGMT >30: CPT | Performed by: STUDENT IN AN ORGANIZED HEALTH CARE EDUCATION/TRAINING PROGRAM

## 2018-10-08 PROCEDURE — 85027 COMPLETE CBC AUTOMATED: CPT | Performed by: PHYSICIAN ASSISTANT

## 2018-10-08 PROCEDURE — 80048 BASIC METABOLIC PNL TOTAL CA: CPT | Performed by: PHYSICIAN ASSISTANT

## 2018-10-08 PROCEDURE — A9270 NON-COVERED ITEM OR SERVICE: HCPCS | Mod: GY | Performed by: PHYSICIAN ASSISTANT

## 2018-10-08 PROCEDURE — 25000132 ZZH RX MED GY IP 250 OP 250 PS 637: Mod: GY | Performed by: PHYSICIAN ASSISTANT

## 2018-10-08 PROCEDURE — 25000132 ZZH RX MED GY IP 250 OP 250 PS 637: Mod: GY | Performed by: HOSPITALIST

## 2018-10-08 PROCEDURE — 00000146 ZZHCL STATISTIC GLUCOSE BY METER IP

## 2018-10-08 PROCEDURE — 80076 HEPATIC FUNCTION PANEL: CPT | Performed by: PHYSICIAN ASSISTANT

## 2018-10-08 PROCEDURE — 25000128 H RX IP 250 OP 636: Performed by: PHYSICIAN ASSISTANT

## 2018-10-08 PROCEDURE — 25000128 H RX IP 250 OP 636: Performed by: STUDENT IN AN ORGANIZED HEALTH CARE EDUCATION/TRAINING PROGRAM

## 2018-10-08 RX ORDER — PIPERACILLIN SODIUM, TAZOBACTAM SODIUM 3; .375 G/15ML; G/15ML
3.38 INJECTION, POWDER, LYOPHILIZED, FOR SOLUTION INTRAVENOUS EVERY 6 HOURS
Status: DISCONTINUED | OUTPATIENT
Start: 2018-10-08 | End: 2018-10-08 | Stop reason: HOSPADM

## 2018-10-08 RX ORDER — OXYCODONE HYDROCHLORIDE 5 MG/1
5-10 TABLET ORAL EVERY 4 HOURS PRN
Qty: 20 TABLET | Refills: 0 | Status: SHIPPED | OUTPATIENT
Start: 2018-10-08 | End: 2020-01-22

## 2018-10-08 RX ADMIN — OXYCODONE HYDROCHLORIDE 5 MG: 5 TABLET ORAL at 00:16

## 2018-10-08 RX ADMIN — SODIUM CHLORIDE: 9 INJECTION, SOLUTION INTRAVENOUS at 00:10

## 2018-10-08 RX ADMIN — METOPROLOL SUCCINATE 200 MG: 100 TABLET, EXTENDED RELEASE ORAL at 08:16

## 2018-10-08 RX ADMIN — POTASSIUM PHOSPHATE, MONOBASIC AND POTASSIUM PHOSPHATE, DIBASIC 15 MMOL: 224; 236 INJECTION, SOLUTION INTRAVENOUS at 01:33

## 2018-10-08 RX ADMIN — GABAPENTIN 600 MG: 600 TABLET, FILM COATED ORAL at 08:16

## 2018-10-08 RX ADMIN — OXYCODONE HYDROCHLORIDE 5 MG: 5 TABLET ORAL at 08:33

## 2018-10-08 RX ADMIN — PIPERACILLIN SODIUM,TAZOBACTAM SODIUM 3.38 G: 3; .375 INJECTION, POWDER, FOR SOLUTION INTRAVENOUS at 00:11

## 2018-10-08 RX ADMIN — PIPERACILLIN SODIUM,TAZOBACTAM SODIUM 3.38 G: 3; .375 INJECTION, POWDER, FOR SOLUTION INTRAVENOUS at 06:16

## 2018-10-08 ASSESSMENT — ACTIVITIES OF DAILY LIVING (ADL)
ADLS_ACUITY_SCORE: 9

## 2018-10-08 NOTE — PROGRESS NOTES
[facility]  Gastroenterology Progress Note     Go Saavedra MRN# [id number]   YOB: 1950 Age: 68 year old          Assessment and Plan:     DKA (diabetic ketoacidosis) (H)  Choledocholithiasis- Status post ERCp on 10/6/2018 revealed 1 common bile duct stone and sludge and possible pus. Endoscopic sphincterotomy was performed. LFTs trending down. Status post lap ifeanyi on 10/7/2018. Patient doing clinically well. Ok with GI to discharge home today and follow up in in 1-2 weeks at [facility].            Severe sepsis (H)  Diabetic ketoacidosis without coma associated with diabetes mellitus due to underlying condition (H)  Ascending cholangitis  Acute biliary pancreatitis, unspecified complication status      Interval History:   no new complaints, denies chest pain, denies shortness of breath, pain is controlled, alert, oriented to person, place and time and doing well; no cp, sob, n/v/d, or abd pain.              Review of Systems:   C: NEGATIVE for fever, chills, change in weight  E/M: NEGATIVE for ear, mouth and throat problems  R: NEGATIVE for significant cough or SOB  CV: NEGATIVE for chest pain, palpitations or peripheral edema             Medications:   I have reviewed this patient's current medications    gabapentin  900 mg Oral QPM     gabapentin (NEURONTIN) tablet 600 mg  600 mg Oral QAM     insulin aspart  1-7 Units Subcutaneous TID AC     insulin aspart  1-5 Units Subcutaneous At Bedtime     insulin glargine  25 Units Subcutaneous At Bedtime     metoprolol succinate  200 mg Oral Daily     piperacillin-tazobactam  3.375 g Intravenous Q6H                  Physical Exam:   Vitals were reviewed  Vital Signs with Ranges  Temp:  [96.9  F (36.1  C)-98.9  F (37.2  C)] 98  F (36.7  C)  Pulse:  [79] 79  Heart Rate:  [66-79] 69  Resp:  [11-23] 16  BP: (110-172)/(46-86) 166/81  SpO2:  [93 %-99 %] 97 %  I/O last 3 completed shifts:  In: 3336 [P.O.:800; I.V.:2536]  Out: 1820  [Urine:1800; Blood:20]  Constitutional: healthy, alert and no distress   Cardiovascular: negative, PMI normal. No lifts, heaves, or thrills. RRR. No murmurs, clicks gallops or rub  Respiratory: negative, Percussion normal. Good diaphragmatic excursion. Lungs clear  Abdomen: Abdomen soft, non-tender. BS normal. No masses, organomegaly, positive findings: tenderness mild epigastric and RUQ at incision site. NO evidence of erythema or infection           Data:   I reviewed the patient's new clinical lab test results.   Recent Labs   Lab Test  10/08/18   0749  10/07/18   1848  10/06/18   1326  10/06/18   0546   WBC  12.4*  11.7*   --   16.3*   HGB  12.8*  12.1*   --   12.6*   MCV  91  91   --   91   PLT  255  189  164  157     Recent Labs   Lab Test  10/08/18   0749  10/07/18   0515  10/06/18   1322   POTASSIUM  4.2  4.4  4.9   CHLORIDE  104  104  107   CO2  24  26  22   BUN  12  13  14   ANIONGAP  9  7  8     Recent Labs   Lab Test  10/08/18   0749  10/07/18   1848  10/07/18   0515  10/06/18   0546   10/05/18   1450  10/05/18   1140  10/26/17   1135   ALBUMIN  2.7*  2.5*  2.5*  2.9*   < >   --   3.9  3.8   BILITOTAL  0.7  0.9  0.9  1.5*   < >   --   4.0*  0.7   ALT  166*  170*  169*  267*   < >   --   428*  46   AST  67*  78*  57*  117*   < >   --   146*  28   PROTEIN   --    --    --    --    --   Negative   --    --    LIPASE   --    --    --   190   --    --   1072*  179    < > = values in this interval not displayed.       I reviewed the patient's new imaging results.    All laboratory data reviewed  All imaging studies reviewed by me.    Cindy Marshall PA-C,  10/8/2018  [facility]  Office : [phone]  Cell: [phone] (Dr. Yang)  Cell: [phone] (Cindy Marshall PA-C)

## 2018-10-08 NOTE — DISCHARGE INSTRUCTIONS
[facility]  Discharge Instructions: Post-Operative Laparoscopic Cholecystectomy    ACTIVITY    Expect to feel tired after your surgery.  This will gradually resolve.      Take frequent, short walks and increase your activity gradually.      Avoid strenuous physical activity or heavy lifting greater than 15-20 lbs. for 2-3 weeks.  You may climb stairs.    You may drive without restrictions when you are not using any prescription pain medication and feel comfortable in a car.    You may return to work/school when you are comfortable without any prescription pain medication.    WOUND CARE    You may remove your outer dressing or Band-Aids and shower 48 hours after the surgery.  Pat your incisions dry and leave them open to air.  Re-apply dressing (Band-Aids or gauze/tape) as needed for comfort or drainage.    You may have steri-strips (looks like white tape) on your incision.  You may peel off the steri-strips 2 weeks after your surgery if they have not peeled off on their own.     Do not soak your incisions in a tub or pool for 2 weeks.     Do not apply any lotions, creams, or ointments to your incisions.    A ridge under your incisions is normal and will gradually resolve.    DIET    Start with liquids, then gradually resume your regular diet as tolerated.  Avoid heavy, spicy, and greasy meals for 2-3 days.    Drink plenty of fluids to stay hydrated.    It is not uncommon to experience some loose stools or diarrhea after surgery.  This is your body s way of adapting to the bile which will slowly drain into your intestine.  A low fat diet may help with this.  This should improve over 1-2 months.    PAIN    Expect some tenderness and discomfort at the incision sites.  Use the prescribed pain medication at your discretion.  Expect gradual resolution of your pain over several days.    You may take ibuprofen with food (unless you have been told not to) instead of or in addition to  your prescribed pain medication.  If you are taking Norco or Percocet, do not take any additional acetaminophen/APAP/Tylenol.    Do not drink alcohol or drive while you are taking pain medications.    You may apply ice to your incisions in 20 minute intervals as needed for the next 48 hours.  After that time, consider switching to heat if you prefer.    EXPECTATIONS    Pain medications can cause constipation.  Limit use when possible.  Take over the counter stool softener/stimulant, such as Colace or Senna, 1-2 times a day with plenty of water.  You may take a mild over the counter laxative, such as Miralax or a suppository, as needed.  You may discontinue these medications once you are having regular bowel movements and/or are no longer taking your narcotic pain medication.      You may have shoulder or upper back discomfort due to the gas used in surgery.  This is temporary and should resolve in 48-72 hours.  Short, frequent walks may help with this.    FOLLOW UP    Our office will contact you approximately 2 weeks to check on your progress and answer any questions you may have.  If you are doing well, you will not need to return for a follow up appointment.  If any concerns are identified over the phone, we will help you make an appointment to see a provider.     If you have not received a phone call, have any questions or concerns, or would like to be seen, please call us at [phone] and ask to speak with our nurse.  We are located at [address] [place] [address].    CALL OUR OFFICE -[phone] IF YOU HAVE:     Chills or fever above 101 F.    Increased redness, warmth, or drainage at your incisions.    Significant bleeding.    Pain not relieved by your pain medication or rest.    Increasing pain after the first 48 hours.    Any other concerns or questions.    Revised January 2018

## 2018-10-08 NOTE — DISCHARGE SUMMARY
Discharge Summary  Hospitalist    Date of Admission:  10/5/2018  Date of Discharge:  10/8/2018  Discharging Provider: Otoniel Pandya MD  Date of Service (when I saw the patient): 10/08/18    Discharge Diagnoses     Ascending Cholangitis  Acute Biliary Pancreatitis  Choledocholithiasis  DKA    History of Present Illness     Go Saavedra is a pleasant 68-year-old  male with history of HLD, HTN, NSVT and ischemic cardiomyopathy status post AICD, CAD status post 3-vessel CABG, and type 2 diabetes, who presented to the Emergency Department with nausea, vomiting and right upper quadrant abdominal pain x 2 days admitted to [facility] on 10/05/2018 for severe sepsis secondary to acute pancreatitis and DKA.    Hospital Course   Go Saavedra was admitted on 10/5/2018.  The following problems were addressed during his hospitalization:    Severe sepsis Secondary to Ascending cholangitis  Acute gallstone pancreatitis  Cholangitis  Choledocholithiasis  Hospital Course: Elevated LFTs and total bili 4.0 on admission, lipase around 1100.  WBC 19.6, tachycardic and lactic acid elevated at 4.4 on admission. With source of infection (cholangitis), SIRS criteria met, and evidence of acute organ dysfunction (LFTs elevated, Total Bili > 2.0, Lactate > 2.0), patient meets Severe sepsis criteria. Treated with 3.5 normal saline liter bolus and Zosyn in the Emergency Department.  CT of chest, abdomen and pelvis suggests mild pancreatitis with subtle induration of the peripancreatic fat. Received additional 1 liter IV fluid bolus for DKA . ERCP showed Choledocholithiasis was found with pus present. Complete removal was accomplished by biliary sphincterotomy and balloon extraction. Was on Zosyn for likely cholangitis. BC NGTD. S/p lap ifeanyi on 10/17. Discharged with 3 days of Augmentin (total 5 day course). Will follow up with GI in 1-2 weeks.      DKA.  Type 2 diabetes with peripheral neuropathy  Metabolic  Acidosis 2/2 to DKA and Severe Sepsis  Assessment: Precipitated by above and holding diabetic medications x 2 days due to not eating.  Anion gap 16, ketones 1.7, lactic acid 4.4, blood glucose 360, WBC 20.  Started on an insulin drip and received 3.5 liter normal saline IV fluid bolus as above in the Emergency Department.  [PTA: Lantus 50u QAM, Novolog 26u TID AC, metformin 500mg with dinner, gabapentin 600mg QAM and 900mg QHS]. Insulin gtt stopped, transitioned to subcutaneous insulin with no complications.  Assessment:   -- Reduced Lantus to 30 U QAM at discharge, titrate to PTA 50 U based on BG at home      CAD status post 3v-CABG (2008).   Ischemic cardiomyopathy and history NSVT status post AICD (2008).  EF 50% (01/2018).   Assessment: Denies recent chest pain, shortness of breath, dyspnea on exertion, orthopnea, lower extremity edema.   Plan:   -- Resumed prior to admission aspirin at dsicharge  -- Resumed prior to admission Toprol 200 mg daily  -- Resumed PTA spironolactone and lisinopril.    Elevated D-dimer.  CT PE study negative for PE.    # Discharge Pain Plan:   - During his hospitalization, Go experienced pain due to post operative pain and pancreatitis.  The pain plan for discharge was discussed with Go and the plan was created in a collaborative fashion.    - Opioids prescribed on discharge: per general surgery    Otoniel Pandya MD    Significant Results and Procedures     10/07/2018  LAPAROSCOPIC CHOLECYSTECTOMY    Pending Results     Unresulted Labs Ordered in the Past 30 Days of this Admission     Date and Time Order Name Status Description    10/7/2018 1511 Surgical pathology exam In process     10/5/2018 1531 Blood culture Preliminary     10/5/2018 1302 Blood culture Preliminary     10/5/2018 1302 Blood culture Preliminary           Code Status   Full Code       Primary Care Physician   Edwina Rodriguez    Physical Exam   Temp: 98  F (36.7  C) Temp src: Oral BP: 166/81 Pulse: 79 Heart  Rate: 69 Resp: 16 SpO2: 97 % O2 Device: None (Room air) Oxygen Delivery: 2 LPM  Vitals:    10/06/18 0707 10/07/18 0600 10/08/18 0600   Weight: 115.5 kg (254 lb 11.2 oz) 116.8 kg (257 lb 9.6 oz) 117 kg (257 lb 15 oz)     Vital Signs with Ranges  Temp:  [96.9  F (36.1  C)-98.9  F (37.2  C)] 98  F (36.7  C)  Pulse:  [79] 79  Heart Rate:  [66-79] 69  Resp:  [11-23] 16  BP: (110-172)/(46-86) 166/81  SpO2:  [93 %-99 %] 97 %  I/O last 3 completed shifts:  In: 3336 [P.O.:800; I.V.:2536]  Out: 1820 [Urine:1800; Blood:20]    Constitutional: Awake, alert, cooperative, no apparent distress  Respiratory: Clear to auscultation bilaterally, no crackles or wheezing  Cardiovascular: Regular rate and rhythm, normal S1 and S2, and no murmur noted  GI: Normal bowel sounds, soft, non-distended, non-tender. Tenderness around lap ifeanyi incision sites, C/D/I  Skin/Integumen: No rashes, no cyanosis, no edema  Other: Normal mood and affect      Discharge Disposition   Discharged to home  Condition at discharge: Stable    Consultations This Hospital Stay   GASTROENTEROLOGY IP CONSULT  SURGERY GENERAL IP CONSULT    Time Spent on this Encounter   I, Otoniel Pandya, personally saw the patient today and spent greater than 30 minutes discharging this patient.    Discharge Orders     Follow-up and recommended labs and tests    See discharge instruction sheet.     Reason for your hospital stay   You had stone in bile duct that needed to be removed and needed gallbladder surgery     Follow-up and recommended labs and tests    Follow up with primary care provider, Edwina Rodriguez, within 7 days for hospital follow- up of cholangitis/choledocholithiasis.  The following labs/tests are recommended: WBC.     Activity   Your activity upon discharge: activity as tolerated     Full Code     Diet   Follow this diet upon discharge: Orders Placed This Encounter     Advance Diet as Tolerated: Regular Diet Adult; 6909-3011 Calories: Moderate Consistent CHO (4-6 CHO  units/meal)       Discharge Medications   Current Discharge Medication List      START taking these medications    Details   amoxicillin-clavulanate (AUGMENTIN) 875-125 MG per tablet Take 1 tablet by mouth 2 times daily for 3 days  Qty: 6 tablet, Refills: 0    Associated Diagnoses: Ascending cholangitis      oxyCODONE IR (ROXICODONE) 5 MG tablet Take 1-2 tablets (5-10 mg) by mouth every 4 hours as needed for moderate to severe pain  Qty: 20 tablet, Refills: 0    Associated Diagnoses: Acute post-operative pain         CONTINUE these medications which have NOT CHANGED    Details   Aspirin (ASPIR-81 PO) Take 81 mg by mouth daily       atorvastatin (LIPITOR) 80 MG tablet Take 80 mg by mouth daily      !! Gabapentin (NEURONTIN PO) Take 600 mg by mouth every morning      !! Gabapentin (NEURONTIN PO) Take 900 mg by mouth every evening      insulin aspart (NOVOLOG FLEXPEN) 100 UNIT/ML injection Inject 26 Units Subcutaneous 3 times daily (with meals)      insulin glargine (LANTUS SOLOSTAR) 100 UNIT/ML injection Inject 58 Units Subcutaneous every morning       lisinopril (PRINIVIL/ZESTRIL) 40 MG tablet Take 40 mg by mouth daily      metFORMIN (GLUCOPHAGE-XR) 500 MG 24 hr tablet Take 500 mg by mouth daily (with dinner)      metoprolol (TOPROL-XL) 100 MG 24 hr tablet Take 200 mg by mouth daily      nitroGLYcerin (NITROSTAT) 0.4 MG sublingual tablet Place 0.4 mg under the tongue every 5 minutes as needed for chest pain For chest pain place 1 tablet under the tongue every 5 minutes for 3 doses. If symptoms persist 5 minutes after 1st dose call 911.      spironolactone (ALDACTONE) 25 MG tablet Take 75 mg by mouth daily       insulin pen needle (NOVOFINE) 30G X 8 MM Use 5 pen needles daily or as directed.       !! - Potential duplicate medications found. Please discuss with provider.      STOP taking these medications       IBUPROFEN PO Comments:   Reason for Stopping:             Allergies   No Known Allergies  Data   Most Recent  3 CBC's:  Recent Labs   Lab Test  10/08/18   0749  10/07/18   1848  10/06/18   1326  10/06/18   0546   WBC  12.4*  11.7*   --   16.3*   HGB  12.8*  12.1*   --   12.6*   MCV  91  91   --   91   PLT  255  189  164  157      Most Recent 3 BMP's:  Recent Labs   Lab Test  10/08/18   0749  10/07/18   0515  10/06/18   1322   NA  137  137  137   POTASSIUM  4.2  4.4  4.9   CHLORIDE  104  104  107   CO2  24  26  22   BUN  12  13  14   CR  0.71  0.72  0.74   ANIONGAP  9  7  8   CLAIR  8.3*  7.7*  8.0*   GLC  187*  163*  184*     Most Recent 2 LFT's:  Recent Labs   Lab Test  10/08/18   0749  10/07/18   1848   AST  67*  78*   ALT  166*  170*   ALKPHOS  90  88   BILITOTAL  0.7  0.9     Most Recent INR's and Anticoagulation Dosing History:  Anticoagulation Dose History     Recent Dosing and Labs Latest Ref Rng & Units 10/17/2008 10/31/2008 11/3/2008 11/9/2008 11/13/2008    INR 0.86 - 1.14 1.12 1.08 1.11 1.11 1.18(H)        Most Recent 3 Troponin's:  Recent Labs   Lab Test  10/05/18   1140  10/26/17   2055  10/26/17   1526   TROPI  <0.015  <0.015  <0.015     Most Recent Cholesterol Panel:No lab results found.  Most Recent 6 Bacteria Isolates From Any Culture (See EPIC Reports for Culture Details):  Recent Labs   Lab Test  10/05/18   1555  10/05/18   1306  10/05/18   1302   CULT  No growth after 3 days  No growth after 3 days  No growth after 3 days     Most Recent TSH, T4 and A1c Labs:  Recent Labs   Lab Test  10/05/18   1555   A1C  7.5*     Results for orders placed or performed during the hospital encounter of 10/05/18   CT Chest (PE) Abdomen Pelvis w Contrast    Narrative    CT CHEST PE ABDOMEN AND PELVIS WITH CONTRAST 10/5/2018 1:58 PM    HISTORY: Abdominal pain and vomiting with elevated D-dimer.    TECHNIQUE: Helical axial scans from the lung apices through pubic  symphysis with 131 mL Isovue-370 IV contrast. Radiation dose for this  scan was reduced using automated exposure control, adjustment of the  mA and/or kV according to  patient size, or iterative reconstruction  technique.    COMPARISON: None.    FINDINGS:    Chest: There is no CT evidence for pulmonary embolism or other acute  vascular abnormality of the chest. Prior median sternotomy. Multi lead  cardiac device. Coronary artery calcifications. Mediastinal and hilar  structures are otherwise unremarkable. The lungs are clear  bilaterally.    Abdomen and pelvis: The liver and spleen are unremarkable. There is  very subtle induration of the fat around the pancreas which could  represent mild pancreatitis and clinical correlation is recommended.  The pancreas is otherwise unremarkable. The adrenal glands bilaterally  and left kidney are normal. The right kidney is normal with exception  of a benign cyst in the upper pole measuring 2 cm. The bowel and  mesentery in the upper abdomen show no abnormality. Mild vascular  calcifications.    Scans through the pelvis show no acute abnormality or free fluid. The  appendix is identified and appears normal. Multilevel degenerative  disc disease in the lumbar spine. Mild degenerative changes of the  hips bilaterally. Chronic ossifications in the region of the right  hamstring tendon origins, likely related to chronic injury.      Impression    IMPRESSION:  1. No evidence for pulmonary embolism.  2. Subtle induration of the peripancreatic fat could represent mild  pancreatitis and clinical correlation is recommended.  3. No other significant abnormality.    NANCY ALVAREZ MD   US Abdomen Limited    Narrative    RIGHT UPPER QUADRANT ULTRASOUND 10/6/2018 8:28 AM    HISTORY:  Evaluate biliary tree. Probable gallstone pancreatitis,  epigastric pain, jaundice.     COMPARISON: None.    FINDINGS:    Gallbladder: Contracted gallbladder which significantly limits  evaluation. There is shadowing extending from the gallbladder which  may be due to gallstones.    Bile ducts:   CHD is normal diameter.  No intrahepatic biliary  dilatation.    Liver:  Fatty  infiltration. No focal hepatic masses are seen.    Pancreas:  Partially obscured. Visualized portions are unremarkable.    Right kidney:   Normal.       Impression    IMPRESSION:    1. Limited evaluation of the gallbladder. Contracted gallbladder with  possible gallstones.  2. No evidence of biliary dilatation.  3. Fatty infiltration of the liver.    MARIANN FALLON MD    Surgery [facility] Fluoro L/T 5 Min w Stills    Narrative    FLUOROSCOPY FOR ERCP   10/6/2018 12:44 PM    HISTORY: ERCP.    COMPARISON: None.    NUMBER OF IMAGES ACQUIRED: 3.    VIEWS: 1.    FLUOROSCOPY TIME: 1.5 minutes.      Impression    IMPRESSION: Images demonstrate an ERCP with contrast seen in the  common bile duct. No abnormality is identified.    SUMEET GAITAN MD

## 2018-10-08 NOTE — PLAN OF CARE
Problem: Patient Care Overview  Goal: Plan of Care/Patient Progress Review  Outcome: Improving  Patient A/O x4. Some HTN (150s/70s), otherwise VSS on RA. BS are audible and active. passing gas, denies nausea. Incision is covered with bandaids, CDI. Pt c/o mild pain, prn oxycodone effective for management. Mod CHO diet, tolerating well. Voiding in urinal. Plan on continuing to monitor and manage pain.

## 2018-10-08 NOTE — PLAN OF CARE
Problem: Patient Care Overview  Goal: Plan of Care/Patient Progress Review  Outcome: Improving  Arrived from A&O, AVSS (has ICD). Sats 95% with 2L O2 via NC. 3 laps, BAs, scant shadowing, otherwise WNL. Arrived nauseous, now resolved without antiemetics. Phos recheck placed for tonight and AM per protocols. Baseline neuropathy in BLE, otherwise CMS intact.

## 2018-10-08 NOTE — PROGRESS NOTES
"[facility]  GENERAL SURGERY Progress Note    Admission Date: 10/5/2018  10/8/2018         Assessment and Plan:   Go Saavedra is a 68 year old male S/P Procedure(s):  LAPAROSCOPIC CHOLECYSTECTOMY, 1 Day Post-Op.  - ADAT  - Oxycodone for pain control  - WBC 12.4, discontinue Zosyn after today or at discharge  - Ambulate 4x day and encourage IS  - Discharge when ok with medicine  - Discharge instructions discussed             Interval History:   Doing well, sore at incisions but otherwise no complaints, pain controlled, tolerating food, ambulating, UO adequate. Meds reviewed.                      Physical Exam:   Blood pressure 166/81, pulse 79, temperature 98  F (36.7  C), temperature source Oral, resp. rate 16, height 5' 8\" (1.727 m), weight 257 lb 15 oz (117 kg), SpO2 97 %.  Temperature Temp  Av.2  F (36.8  C)  Min: 96.9  F (36.1  C)  Max: 98.9  F (37.2  C)   I/O last 3 completed shifts:  In: 3336 [P.O.:800; I.V.:2536]  Out: 1820 [Urine:1800; Blood:20]  Constitutional:  Awake, alert, oriented, and in no apparent distress.   Lungs: No increased work of breathing, good air exchange, clear to auscultation bilaterally, and no crackles or wheezing.   Cardiovascular: Regular rate and rhythm, normal S1 and S2, and no murmur noted.   Abdomen: Soft, non-distended, appropriately tender at incision(s), + BS.   Wounds: Clean, dry, and intact. Steri strips in place. No erythema or drainage.    Extremities: No edema or calf tenderness.          Data:     Recent Labs   Lab Test  10/08/18   0749  10/07/18   1848  10/06/18   1326  10/06/18   0546   WBC  12.4*  11.7*   --   16.3*   HGB  12.8*  12.1*   --   12.6*   HCT  37.7*  35.4*   --   37.5*   PLT  255  189  164  157      Recent Labs   Lab Test  10/08/18   0749  10/07/18   0515  10/06/18   1322   NA  137  137  137   POTASSIUM  4.2  4.4  4.9   CHLORIDE  104  104  107   CO2  24  26  22   BUN  12  13  14   CR  0.71  0.72  0.74     Recent Labs   Lab Test  " 10/08/18   0749  10/07/18   1848  10/07/18   0515  10/06/18   0546   10/05/18   1140  10/26/17   1135   BILITOTAL  0.7  0.9  0.9  1.5*   < >  4.0*  0.7   DBIL  0.2  0.4*  0.3*  0.5*   < >   --    --    ALT  166*  170*  169*  267*   < >  428*  46   AST  67*  78*  57*  117*   < >  146*  28   ALKPHOS  90  88  84  103   < >  152*  53   LIPASE   --    --    --   190   --   1072*  179    < > = values in this interval not displayed.       Macie Waggoner PA-C  [facility]  [phone]    Agree with above.  Pt doing well, okay for discharge when approved by medical team  Giuseppe Matute MD  General Surgery, Office [phone]

## 2018-10-09 ENCOUNTER — TELEPHONE (OUTPATIENT)
Dept: SURGERY | Facility: CLINIC | Age: 68
End: 2018-10-09

## 2018-10-09 DIAGNOSIS — Z98.890 POSTOPERATIVE NAUSEA: Primary | ICD-10-CM

## 2018-10-09 DIAGNOSIS — R11.0 POSTOPERATIVE NAUSEA: Primary | ICD-10-CM

## 2018-10-09 LAB — COPATH REPORT: NORMAL

## 2018-10-09 RX ORDER — ONDANSETRON 4 MG/1
4-8 TABLET, ORALLY DISINTEGRATING ORAL EVERY 8 HOURS PRN
Qty: 15 TABLET | Refills: 0 | Status: SHIPPED | OUTPATIENT
Start: 2018-10-09 | End: 2020-11-13

## 2018-10-09 NOTE — TELEPHONE ENCOUNTER
Patient had laparoscopic cholecystectomy with Dr. Matute on 10/07/2018.    He is calling today wishing to discuss discharge instructions as he misplaced his forms.  Patient provided his email and instructions will be mailed to him.  He does not have any pressing questions or concerns.  He reports that he is still experiencing some nausea and was under the impression that he would be sent home with some anti nausea medications, but wasn't/  Will e-prescribe zofran per Ryan Landry to pharmacy of patient's choice.    He will call PRN.    Geri Rodriguez RN

## 2018-10-11 LAB
BACTERIA SPEC CULT: NO GROWTH
Lab: NORMAL
SPECIMEN SOURCE: NORMAL

## 2018-10-22 ENCOUNTER — TELEPHONE (OUTPATIENT)
Dept: SURGERY | Facility: CLINIC | Age: 68
End: 2018-10-22

## 2018-10-22 NOTE — TELEPHONE ENCOUNTER
[facility]  Post op call note - No Answer    Go Saavedra was called for an update regarding his recovery. There was no answer and a message was left instructing the patient to call the office with any questions or concerns.    Macie Waggoner PA-C

## 2018-12-10 ENCOUNTER — ANCILLARY PROCEDURE (OUTPATIENT)
Dept: CARDIOLOGY | Facility: CLINIC | Age: 68
End: 2018-12-10
Attending: INTERNAL MEDICINE
Payer: COMMERCIAL

## 2018-12-10 DIAGNOSIS — Z95.810 ICD (IMPLANTABLE CARDIOVERTER-DEFIBRILLATOR) IN PLACE: ICD-10-CM

## 2018-12-10 PROCEDURE — 93283 PRGRMG EVAL IMPLANTABLE DFB: CPT | Performed by: INTERNAL MEDICINE

## 2018-12-10 NOTE — PROGRESS NOTES
[employer] Teligen (D) Pacemaker Device Check  AP: *** % : *** %  Mode: ***        Underlying Rhythm: ***  Heart Rate: ***  Sensing: ***    Pacing Threshold: ***   Impedance: ***  Battery Status: ***  Device Site: ***  Atrial Arrhythmia: ***  Ventricular Arrhythmia: ***  Setting Change: ***    Care Plan: ***     I have reviewed and interpreted the device interrogation, settings, programming and summary. The device is functioning within normal device parameters. I agree with the current findings, assessment and plan.

## 2018-12-14 LAB
ICDO DEVICE COMMENTS: NORMAL
MDC_IDC_LEAD_IMPLANT_DT: NORMAL
MDC_IDC_LEAD_IMPLANT_DT: NORMAL
MDC_IDC_LEAD_LOCATION: NORMAL
MDC_IDC_LEAD_LOCATION: NORMAL
MDC_IDC_LEAD_LOCATION_DETAIL_1: NORMAL
MDC_IDC_LEAD_LOCATION_DETAIL_1: NORMAL
MDC_IDC_LEAD_MFG: NORMAL
MDC_IDC_LEAD_MFG: NORMAL
MDC_IDC_LEAD_MODEL: NORMAL
MDC_IDC_LEAD_MODEL: NORMAL
MDC_IDC_LEAD_POLARITY_TYPE: NORMAL
MDC_IDC_LEAD_POLARITY_TYPE: NORMAL
MDC_IDC_LEAD_SERIAL: NORMAL
MDC_IDC_LEAD_SERIAL: NORMAL
MDC_IDC_PG_IMPLANT_DTM: NORMAL
MDC_IDC_PG_MFG: NORMAL
MDC_IDC_PG_MODEL: NORMAL
MDC_IDC_PG_SERIAL: 7643
MDC_IDC_PG_TYPE: NORMAL
MDC_IDC_SESS_CLINIC_NAME: NORMAL
MDC_IDC_SESS_DTM: NORMAL
MDC_IDC_SESS_TYPE: NORMAL

## 2019-01-07 ENCOUNTER — TELEPHONE (OUTPATIENT)
Dept: OTHER | Facility: CLINIC | Age: 69
End: 2019-01-07

## 2019-01-07 DIAGNOSIS — I73.9 PVD (PERIPHERAL VASCULAR DISEASE) (H): Primary | ICD-10-CM

## 2019-01-07 NOTE — TELEPHONE ENCOUNTER
Scheduled for 1/17/18 with Mrs Saavedra, SAMARIA and WRO visit. Ruma Rodney -  at [facility]

## 2019-01-07 NOTE — TELEPHONE ENCOUNTER
Tracy, pts sister also called [phone] to explain pt referred by Dr. Mi at [facility]. Pt hx of toe amputation 2 weeks ago, they are discussing amputating all toes on right foot.     Referring to Dr. Kramer for second opinion.     Right leg angio in [facility], left leg angio scheduled for 1-9-19 at [facility].     Pt needs to be scheduled for SAMARIA and consult with vascular surgery (Dr. Kramer).  Will route to scheduling to coordinate an appointment as soon as possible.     Aicha Ortez, ANDREWN, RN

## 2019-01-07 NOTE — TELEPHONE ENCOUNTER
Ralph called, he said he was referred to Dr Kramer for a second opinion on the amputation of the remaining toes on his right foot.  He already has had one to amputated on the right.  He is scheduled at [facility] on 1/9/18 for a left leg angiogram.  He had possibly SAMARIA but for sure arterial ultrasound at [facility] in early December.  Routing to Dr Kramer's RN to determine if records available/imaging needed. Ruma Rodney -  at Vascular [facility]

## 2019-01-17 NOTE — TELEPHONE ENCOUNTER
Patient cancelled his appt. Closing encounter. Ruma Rodney -  at [facility]

## 2019-04-04 LAB
CHOLEST SERPL-MCNC: 167 MG/DL
HDLC SERPL-MCNC: 22 MG/DL
LDLC SERPL CALC-MCNC: 112 MG/DL
NONHDLC SERPL-MCNC: 145 MG/DL
TRIGL SERPL-MCNC: 163 MG/DL

## 2019-04-16 ENCOUNTER — DOCUMENTATION ONLY (OUTPATIENT)
Dept: CARDIOLOGY | Facility: CLINIC | Age: 69
End: 2019-04-16

## 2019-04-16 NOTE — TELEPHONE ENCOUNTER
Patient missed [employer] transmission on 4/8/19. Sent letter 4/9, no response. Sent 1 week letter today

## 2019-04-25 ENCOUNTER — ANCILLARY PROCEDURE (OUTPATIENT)
Dept: CARDIOLOGY | Facility: CLINIC | Age: 69
End: 2019-04-25
Attending: INTERNAL MEDICINE
Payer: COMMERCIAL

## 2019-04-25 DIAGNOSIS — Z95.810 ICD (IMPLANTABLE CARDIOVERTER-DEFIBRILLATOR), DUAL, IN SITU: ICD-10-CM

## 2019-04-25 PROCEDURE — 93296 REM INTERROG EVL PM/IDS: CPT | Performed by: INTERNAL MEDICINE

## 2019-04-25 PROCEDURE — 93295 DEV INTERROG REMOTE 1/2/MLT: CPT | Performed by: INTERNAL MEDICINE

## 2019-05-07 LAB
MDC_IDC_EPISODE_DTM: NORMAL
MDC_IDC_EPISODE_DURATION: 1 S
MDC_IDC_EPISODE_DURATION: 10 S
MDC_IDC_EPISODE_DURATION: 113 S
MDC_IDC_EPISODE_DURATION: 12 S
MDC_IDC_EPISODE_DURATION: 13 S
MDC_IDC_EPISODE_DURATION: 13 S
MDC_IDC_EPISODE_DURATION: 131 S
MDC_IDC_EPISODE_DURATION: 131 S
MDC_IDC_EPISODE_DURATION: 15 S
MDC_IDC_EPISODE_DURATION: 16 S
MDC_IDC_EPISODE_DURATION: 19 S
MDC_IDC_EPISODE_DURATION: 20 S
MDC_IDC_EPISODE_DURATION: 24 S
MDC_IDC_EPISODE_DURATION: 29 S
MDC_IDC_EPISODE_DURATION: 32 S
MDC_IDC_EPISODE_DURATION: 35 S
MDC_IDC_EPISODE_DURATION: 35 S
MDC_IDC_EPISODE_DURATION: 4 S
MDC_IDC_EPISODE_DURATION: 4 S
MDC_IDC_EPISODE_DURATION: 44 S
MDC_IDC_EPISODE_DURATION: 5 S
MDC_IDC_EPISODE_DURATION: 50 S
MDC_IDC_EPISODE_DURATION: 52 S
MDC_IDC_EPISODE_DURATION: 58 S
MDC_IDC_EPISODE_DURATION: 6 S
MDC_IDC_EPISODE_DURATION: 6 S
MDC_IDC_EPISODE_DURATION: 61 S
MDC_IDC_EPISODE_DURATION: 7 S
MDC_IDC_EPISODE_DURATION: 8 S
MDC_IDC_EPISODE_DURATION: 93 S
MDC_IDC_EPISODE_ID: NORMAL
MDC_IDC_EPISODE_TYPE: NORMAL
MDC_IDC_EPISODE_VENDOR_TYPE: NORMAL
MDC_IDC_LEAD_IMPLANT_DT: NORMAL
MDC_IDC_LEAD_IMPLANT_DT: NORMAL
MDC_IDC_LEAD_LOCATION: NORMAL
MDC_IDC_LEAD_LOCATION: NORMAL
MDC_IDC_LEAD_LOCATION_DETAIL_1: NORMAL
MDC_IDC_LEAD_LOCATION_DETAIL_1: NORMAL
MDC_IDC_LEAD_MFG: NORMAL
MDC_IDC_LEAD_MFG: NORMAL
MDC_IDC_LEAD_MODEL: NORMAL
MDC_IDC_LEAD_MODEL: NORMAL
MDC_IDC_LEAD_POLARITY_TYPE: NORMAL
MDC_IDC_LEAD_POLARITY_TYPE: NORMAL
MDC_IDC_LEAD_SERIAL: NORMAL
MDC_IDC_LEAD_SERIAL: NORMAL
MDC_IDC_MSMT_BATTERY_DTM: NORMAL
MDC_IDC_MSMT_BATTERY_REMAINING_LONGEVITY: 18 MO
MDC_IDC_MSMT_BATTERY_REMAINING_PERCENTAGE: 24 %
MDC_IDC_MSMT_BATTERY_STATUS: NORMAL
MDC_IDC_MSMT_CAP_CHARGE_DTM: NORMAL
MDC_IDC_MSMT_CAP_CHARGE_DTM: NORMAL
MDC_IDC_MSMT_CAP_CHARGE_ENERGY: 41 J
MDC_IDC_MSMT_CAP_CHARGE_TIME: 11.6 S
MDC_IDC_MSMT_CAP_CHARGE_TIME: 7.5 S
MDC_IDC_MSMT_CAP_CHARGE_TYPE: NORMAL
MDC_IDC_MSMT_CAP_CHARGE_TYPE: NORMAL
MDC_IDC_MSMT_LEADCHNL_RA_IMPEDANCE_VALUE: 591 OHM
MDC_IDC_MSMT_LEADCHNL_RA_PACING_THRESHOLD_AMPLITUDE: 1.2 V
MDC_IDC_MSMT_LEADCHNL_RA_PACING_THRESHOLD_PULSEWIDTH: 0.5 MS
MDC_IDC_MSMT_LEADCHNL_RV_IMPEDANCE_VALUE: 372 OHM
MDC_IDC_MSMT_LEADCHNL_RV_PACING_THRESHOLD_AMPLITUDE: 1.3 V
MDC_IDC_MSMT_LEADCHNL_RV_PACING_THRESHOLD_PULSEWIDTH: 0.5 MS
MDC_IDC_PG_IMPLANT_DTM: NORMAL
MDC_IDC_PG_MFG: NORMAL
MDC_IDC_PG_MODEL: NORMAL
MDC_IDC_PG_SERIAL: 7643
MDC_IDC_PG_TYPE: NORMAL
MDC_IDC_SESS_CLINIC_NAME: NORMAL
MDC_IDC_SESS_DTM: NORMAL
MDC_IDC_SESS_TYPE: NORMAL
MDC_IDC_SET_BRADY_AT_MODE_SWITCH_MODE: NORMAL
MDC_IDC_SET_BRADY_AT_MODE_SWITCH_RATE: 170 {BEATS}/MIN
MDC_IDC_SET_BRADY_LOWRATE: 60 {BEATS}/MIN
MDC_IDC_SET_BRADY_MAX_SENSOR_RATE: 130 {BEATS}/MIN
MDC_IDC_SET_BRADY_MAX_TRACKING_RATE: 130 {BEATS}/MIN
MDC_IDC_SET_BRADY_MODE: NORMAL
MDC_IDC_SET_BRADY_PAV_DELAY_HIGH: 220 MS
MDC_IDC_SET_BRADY_PAV_DELAY_LOW: 400 MS
MDC_IDC_SET_BRADY_SAV_DELAY_HIGH: 220 MS
MDC_IDC_SET_BRADY_SAV_DELAY_LOW: 400 MS
MDC_IDC_SET_LEADCHNL_RA_PACING_AMPLITUDE: 2 V
MDC_IDC_SET_LEADCHNL_RA_PACING_POLARITY: NORMAL
MDC_IDC_SET_LEADCHNL_RA_PACING_PULSEWIDTH: 0.5 MS
MDC_IDC_SET_LEADCHNL_RA_SENSING_ADAPTATION_MODE: NORMAL
MDC_IDC_SET_LEADCHNL_RA_SENSING_POLARITY: NORMAL
MDC_IDC_SET_LEADCHNL_RA_SENSING_SENSITIVITY: 0.25 MV
MDC_IDC_SET_LEADCHNL_RV_PACING_AMPLITUDE: 2.5 V
MDC_IDC_SET_LEADCHNL_RV_PACING_POLARITY: NORMAL
MDC_IDC_SET_LEADCHNL_RV_PACING_PULSEWIDTH: 0.5 MS
MDC_IDC_SET_LEADCHNL_RV_SENSING_ADAPTATION_MODE: NORMAL
MDC_IDC_SET_LEADCHNL_RV_SENSING_POLARITY: NORMAL
MDC_IDC_SET_LEADCHNL_RV_SENSING_SENSITIVITY: 0.6 MV
MDC_IDC_SET_ZONE_DETECTION_INTERVAL: 300 MS
MDC_IDC_SET_ZONE_DETECTION_INTERVAL: 353 MS
MDC_IDC_SET_ZONE_DETECTION_INTERVAL: 400 MS
MDC_IDC_SET_ZONE_TYPE: NORMAL
MDC_IDC_SET_ZONE_VENDOR_TYPE: NORMAL
MDC_IDC_STAT_BRADY_DTM_END: NORMAL
MDC_IDC_STAT_BRADY_DTM_START: NORMAL
MDC_IDC_STAT_BRADY_RA_PERCENT_PACED: 11 %
MDC_IDC_STAT_BRADY_RV_PERCENT_PACED: 0 %
MDC_IDC_STAT_EPISODE_RECENT_COUNT: 0
MDC_IDC_STAT_EPISODE_RECENT_COUNT: 9
MDC_IDC_STAT_EPISODE_RECENT_COUNT_DTM_END: NORMAL
MDC_IDC_STAT_EPISODE_RECENT_COUNT_DTM_START: NORMAL
MDC_IDC_STAT_EPISODE_TYPE: NORMAL
MDC_IDC_STAT_EPISODE_VENDOR_TYPE: NORMAL
MDC_IDC_STAT_TACHYTHERAPY_ATP_DELIVERED_RECENT: 0
MDC_IDC_STAT_TACHYTHERAPY_ATP_DELIVERED_TOTAL: 7
MDC_IDC_STAT_TACHYTHERAPY_RECENT_DTM_END: NORMAL
MDC_IDC_STAT_TACHYTHERAPY_RECENT_DTM_START: NORMAL
MDC_IDC_STAT_TACHYTHERAPY_SHOCKS_ABORTED_RECENT: 0
MDC_IDC_STAT_TACHYTHERAPY_SHOCKS_ABORTED_TOTAL: 1
MDC_IDC_STAT_TACHYTHERAPY_SHOCKS_DELIVERED_RECENT: 0
MDC_IDC_STAT_TACHYTHERAPY_SHOCKS_DELIVERED_TOTAL: 7
MDC_IDC_STAT_TACHYTHERAPY_TOTAL_DTM_END: NORMAL
MDC_IDC_STAT_TACHYTHERAPY_TOTAL_DTM_START: NORMAL

## 2019-07-29 ENCOUNTER — ANCILLARY PROCEDURE (OUTPATIENT)
Dept: CARDIOLOGY | Facility: CLINIC | Age: 69
End: 2019-07-29
Attending: INTERNAL MEDICINE
Payer: COMMERCIAL

## 2019-07-29 DIAGNOSIS — Z95.810 ICD (IMPLANTABLE CARDIOVERTER-DEFIBRILLATOR), DUAL, IN SITU: ICD-10-CM

## 2019-07-29 PROCEDURE — 93295 DEV INTERROG REMOTE 1/2/MLT: CPT | Performed by: INTERNAL MEDICINE

## 2019-08-06 LAB
MDC_IDC_EPISODE_DTM: NORMAL
MDC_IDC_EPISODE_DURATION: 1 S
MDC_IDC_EPISODE_DURATION: 1 S
MDC_IDC_EPISODE_DURATION: 2 S
MDC_IDC_EPISODE_DURATION: 3 S
MDC_IDC_EPISODE_DURATION: 4 S
MDC_IDC_EPISODE_DURATION: 5 S
MDC_IDC_EPISODE_DURATION: 9 S
MDC_IDC_EPISODE_ID: NORMAL
MDC_IDC_EPISODE_TYPE: NORMAL
MDC_IDC_LEAD_IMPLANT_DT: NORMAL
MDC_IDC_LEAD_IMPLANT_DT: NORMAL
MDC_IDC_LEAD_LOCATION: NORMAL
MDC_IDC_LEAD_LOCATION: NORMAL
MDC_IDC_LEAD_LOCATION_DETAIL_1: NORMAL
MDC_IDC_LEAD_LOCATION_DETAIL_1: NORMAL
MDC_IDC_LEAD_MFG: NORMAL
MDC_IDC_LEAD_MFG: NORMAL
MDC_IDC_LEAD_MODEL: NORMAL
MDC_IDC_LEAD_MODEL: NORMAL
MDC_IDC_LEAD_POLARITY_TYPE: NORMAL
MDC_IDC_LEAD_POLARITY_TYPE: NORMAL
MDC_IDC_LEAD_SERIAL: NORMAL
MDC_IDC_LEAD_SERIAL: NORMAL
MDC_IDC_MSMT_BATTERY_DTM: NORMAL
MDC_IDC_MSMT_BATTERY_REMAINING_LONGEVITY: 18 MO
MDC_IDC_MSMT_BATTERY_REMAINING_PERCENTAGE: 20 %
MDC_IDC_MSMT_BATTERY_STATUS: NORMAL
MDC_IDC_MSMT_CAP_CHARGE_DTM: NORMAL
MDC_IDC_MSMT_CAP_CHARGE_DTM: NORMAL
MDC_IDC_MSMT_CAP_CHARGE_ENERGY: 41 J
MDC_IDC_MSMT_CAP_CHARGE_TIME: 11.7 S
MDC_IDC_MSMT_CAP_CHARGE_TIME: 7.5 S
MDC_IDC_MSMT_CAP_CHARGE_TYPE: NORMAL
MDC_IDC_MSMT_CAP_CHARGE_TYPE: NORMAL
MDC_IDC_MSMT_LEADCHNL_RA_IMPEDANCE_VALUE: 625 OHM
MDC_IDC_MSMT_LEADCHNL_RA_PACING_THRESHOLD_AMPLITUDE: 1.2 V
MDC_IDC_MSMT_LEADCHNL_RA_PACING_THRESHOLD_PULSEWIDTH: 0.5 MS
MDC_IDC_MSMT_LEADCHNL_RV_IMPEDANCE_VALUE: 388 OHM
MDC_IDC_MSMT_LEADCHNL_RV_PACING_THRESHOLD_AMPLITUDE: 1.3 V
MDC_IDC_MSMT_LEADCHNL_RV_PACING_THRESHOLD_PULSEWIDTH: 0.5 MS
MDC_IDC_PG_IMPLANT_DTM: NORMAL
MDC_IDC_PG_MFG: NORMAL
MDC_IDC_PG_MODEL: NORMAL
MDC_IDC_PG_SERIAL: 7643
MDC_IDC_PG_TYPE: NORMAL
MDC_IDC_SESS_CLINIC_NAME: NORMAL
MDC_IDC_SESS_DTM: NORMAL
MDC_IDC_SESS_TYPE: NORMAL
MDC_IDC_SET_BRADY_AT_MODE_SWITCH_MODE: NORMAL
MDC_IDC_SET_BRADY_AT_MODE_SWITCH_RATE: 170 {BEATS}/MIN
MDC_IDC_SET_BRADY_LOWRATE: 60 {BEATS}/MIN
MDC_IDC_SET_BRADY_MAX_SENSOR_RATE: 130 {BEATS}/MIN
MDC_IDC_SET_BRADY_MAX_TRACKING_RATE: 130 {BEATS}/MIN
MDC_IDC_SET_BRADY_MODE: NORMAL
MDC_IDC_SET_BRADY_PAV_DELAY_HIGH: 220 MS
MDC_IDC_SET_BRADY_PAV_DELAY_LOW: 400 MS
MDC_IDC_SET_BRADY_SAV_DELAY_HIGH: 220 MS
MDC_IDC_SET_BRADY_SAV_DELAY_LOW: 400 MS
MDC_IDC_SET_LEADCHNL_RA_PACING_AMPLITUDE: 2 V
MDC_IDC_SET_LEADCHNL_RA_PACING_POLARITY: NORMAL
MDC_IDC_SET_LEADCHNL_RA_PACING_PULSEWIDTH: 0.5 MS
MDC_IDC_SET_LEADCHNL_RA_SENSING_ADAPTATION_MODE: NORMAL
MDC_IDC_SET_LEADCHNL_RA_SENSING_POLARITY: NORMAL
MDC_IDC_SET_LEADCHNL_RA_SENSING_SENSITIVITY: 0.25 MV
MDC_IDC_SET_LEADCHNL_RV_PACING_AMPLITUDE: 2.5 V
MDC_IDC_SET_LEADCHNL_RV_PACING_POLARITY: NORMAL
MDC_IDC_SET_LEADCHNL_RV_PACING_PULSEWIDTH: 0.5 MS
MDC_IDC_SET_LEADCHNL_RV_SENSING_ADAPTATION_MODE: NORMAL
MDC_IDC_SET_LEADCHNL_RV_SENSING_POLARITY: NORMAL
MDC_IDC_SET_LEADCHNL_RV_SENSING_SENSITIVITY: 0.6 MV
MDC_IDC_SET_ZONE_DETECTION_INTERVAL: 300 MS
MDC_IDC_SET_ZONE_DETECTION_INTERVAL: 353 MS
MDC_IDC_SET_ZONE_DETECTION_INTERVAL: 400 MS
MDC_IDC_SET_ZONE_TYPE: NORMAL
MDC_IDC_SET_ZONE_VENDOR_TYPE: NORMAL
MDC_IDC_STAT_BRADY_DTM_END: NORMAL
MDC_IDC_STAT_BRADY_DTM_START: NORMAL
MDC_IDC_STAT_BRADY_RA_PERCENT_PACED: 10 %
MDC_IDC_STAT_BRADY_RV_PERCENT_PACED: 0 %
MDC_IDC_STAT_EPISODE_RECENT_COUNT: 0
MDC_IDC_STAT_EPISODE_RECENT_COUNT: 1
MDC_IDC_STAT_EPISODE_RECENT_COUNT: 202
MDC_IDC_STAT_EPISODE_RECENT_COUNT_DTM_END: NORMAL
MDC_IDC_STAT_EPISODE_RECENT_COUNT_DTM_START: NORMAL
MDC_IDC_STAT_EPISODE_TYPE: NORMAL
MDC_IDC_STAT_EPISODE_VENDOR_TYPE: NORMAL
MDC_IDC_STAT_TACHYTHERAPY_ATP_DELIVERED_RECENT: 0
MDC_IDC_STAT_TACHYTHERAPY_ATP_DELIVERED_TOTAL: 7
MDC_IDC_STAT_TACHYTHERAPY_RECENT_DTM_END: NORMAL
MDC_IDC_STAT_TACHYTHERAPY_RECENT_DTM_START: NORMAL
MDC_IDC_STAT_TACHYTHERAPY_SHOCKS_ABORTED_RECENT: 0
MDC_IDC_STAT_TACHYTHERAPY_SHOCKS_ABORTED_TOTAL: 1
MDC_IDC_STAT_TACHYTHERAPY_SHOCKS_DELIVERED_RECENT: 0
MDC_IDC_STAT_TACHYTHERAPY_SHOCKS_DELIVERED_TOTAL: 7
MDC_IDC_STAT_TACHYTHERAPY_TOTAL_DTM_END: NORMAL
MDC_IDC_STAT_TACHYTHERAPY_TOTAL_DTM_START: NORMAL

## 2019-10-30 ENCOUNTER — ANCILLARY PROCEDURE (OUTPATIENT)
Dept: CARDIOLOGY | Facility: CLINIC | Age: 69
End: 2019-10-30
Attending: INTERNAL MEDICINE
Payer: COMMERCIAL

## 2019-10-30 DIAGNOSIS — Z95.810 ICD (IMPLANTABLE CARDIOVERTER-DEFIBRILLATOR) IN PLACE: ICD-10-CM

## 2019-10-30 LAB
MDC_IDC_EPISODE_DTM: NORMAL
MDC_IDC_EPISODE_DURATION: 1 S
MDC_IDC_EPISODE_DURATION: 12 S
MDC_IDC_EPISODE_DURATION: 2 S
MDC_IDC_EPISODE_DURATION: 2 S
MDC_IDC_EPISODE_DURATION: 3 S
MDC_IDC_EPISODE_DURATION: 35 S
MDC_IDC_EPISODE_DURATION: 4 S
MDC_IDC_EPISODE_DURATION: 4 S
MDC_IDC_EPISODE_DURATION: 6 S
MDC_IDC_EPISODE_DURATION: 9 S
MDC_IDC_EPISODE_ID: NORMAL
MDC_IDC_EPISODE_TYPE: NORMAL
MDC_IDC_EPISODE_VENDOR_TYPE: NORMAL
MDC_IDC_LEAD_IMPLANT_DT: NORMAL
MDC_IDC_LEAD_IMPLANT_DT: NORMAL
MDC_IDC_LEAD_LOCATION: NORMAL
MDC_IDC_LEAD_LOCATION: NORMAL
MDC_IDC_LEAD_LOCATION_DETAIL_1: NORMAL
MDC_IDC_LEAD_LOCATION_DETAIL_1: NORMAL
MDC_IDC_LEAD_MFG: NORMAL
MDC_IDC_LEAD_MFG: NORMAL
MDC_IDC_LEAD_MODEL: NORMAL
MDC_IDC_LEAD_MODEL: NORMAL
MDC_IDC_LEAD_POLARITY_TYPE: NORMAL
MDC_IDC_LEAD_POLARITY_TYPE: NORMAL
MDC_IDC_LEAD_SERIAL: NORMAL
MDC_IDC_LEAD_SERIAL: NORMAL
MDC_IDC_MSMT_BATTERY_DTM: NORMAL
MDC_IDC_MSMT_BATTERY_REMAINING_LONGEVITY: 12 MO
MDC_IDC_MSMT_BATTERY_REMAINING_PERCENTAGE: 15 %
MDC_IDC_MSMT_BATTERY_STATUS: NORMAL
MDC_IDC_MSMT_CAP_CHARGE_DTM: NORMAL
MDC_IDC_MSMT_CAP_CHARGE_DTM: NORMAL
MDC_IDC_MSMT_CAP_CHARGE_ENERGY: 41 J
MDC_IDC_MSMT_CAP_CHARGE_TIME: 11.9 S
MDC_IDC_MSMT_CAP_CHARGE_TIME: 7.5 S
MDC_IDC_MSMT_CAP_CHARGE_TYPE: NORMAL
MDC_IDC_MSMT_CAP_CHARGE_TYPE: NORMAL
MDC_IDC_MSMT_LEADCHNL_RA_IMPEDANCE_VALUE: 590 OHM
MDC_IDC_MSMT_LEADCHNL_RA_PACING_THRESHOLD_AMPLITUDE: 1.2 V
MDC_IDC_MSMT_LEADCHNL_RA_PACING_THRESHOLD_PULSEWIDTH: 0.5 MS
MDC_IDC_MSMT_LEADCHNL_RV_IMPEDANCE_VALUE: 373 OHM
MDC_IDC_MSMT_LEADCHNL_RV_PACING_THRESHOLD_AMPLITUDE: 1.3 V
MDC_IDC_MSMT_LEADCHNL_RV_PACING_THRESHOLD_PULSEWIDTH: 0.5 MS
MDC_IDC_PG_IMPLANT_DTM: NORMAL
MDC_IDC_PG_MFG: NORMAL
MDC_IDC_PG_MODEL: NORMAL
MDC_IDC_PG_SERIAL: 7643
MDC_IDC_PG_TYPE: NORMAL
MDC_IDC_SESS_CLINIC_NAME: NORMAL
MDC_IDC_SESS_DTM: NORMAL
MDC_IDC_SESS_TYPE: NORMAL
MDC_IDC_SET_BRADY_AT_MODE_SWITCH_MODE: NORMAL
MDC_IDC_SET_BRADY_AT_MODE_SWITCH_RATE: 170 {BEATS}/MIN
MDC_IDC_SET_BRADY_LOWRATE: 60 {BEATS}/MIN
MDC_IDC_SET_BRADY_MAX_SENSOR_RATE: 130 {BEATS}/MIN
MDC_IDC_SET_BRADY_MAX_TRACKING_RATE: 130 {BEATS}/MIN
MDC_IDC_SET_BRADY_MODE: NORMAL
MDC_IDC_SET_BRADY_PAV_DELAY_HIGH: 220 MS
MDC_IDC_SET_BRADY_PAV_DELAY_LOW: 400 MS
MDC_IDC_SET_BRADY_SAV_DELAY_HIGH: 220 MS
MDC_IDC_SET_BRADY_SAV_DELAY_LOW: 400 MS
MDC_IDC_SET_LEADCHNL_RA_PACING_AMPLITUDE: 2 V
MDC_IDC_SET_LEADCHNL_RA_PACING_POLARITY: NORMAL
MDC_IDC_SET_LEADCHNL_RA_PACING_PULSEWIDTH: 0.5 MS
MDC_IDC_SET_LEADCHNL_RA_SENSING_ADAPTATION_MODE: NORMAL
MDC_IDC_SET_LEADCHNL_RA_SENSING_POLARITY: NORMAL
MDC_IDC_SET_LEADCHNL_RA_SENSING_SENSITIVITY: 0.25 MV
MDC_IDC_SET_LEADCHNL_RV_PACING_AMPLITUDE: 2.5 V
MDC_IDC_SET_LEADCHNL_RV_PACING_POLARITY: NORMAL
MDC_IDC_SET_LEADCHNL_RV_PACING_PULSEWIDTH: 0.5 MS
MDC_IDC_SET_LEADCHNL_RV_SENSING_ADAPTATION_MODE: NORMAL
MDC_IDC_SET_LEADCHNL_RV_SENSING_POLARITY: NORMAL
MDC_IDC_SET_LEADCHNL_RV_SENSING_SENSITIVITY: 0.6 MV
MDC_IDC_SET_ZONE_DETECTION_INTERVAL: 300 MS
MDC_IDC_SET_ZONE_DETECTION_INTERVAL: 353 MS
MDC_IDC_SET_ZONE_DETECTION_INTERVAL: 400 MS
MDC_IDC_SET_ZONE_TYPE: NORMAL
MDC_IDC_SET_ZONE_VENDOR_TYPE: NORMAL
MDC_IDC_STAT_BRADY_DTM_END: NORMAL
MDC_IDC_STAT_BRADY_DTM_START: NORMAL
MDC_IDC_STAT_BRADY_RA_PERCENT_PACED: 9 %
MDC_IDC_STAT_BRADY_RV_PERCENT_PACED: 0 %
MDC_IDC_STAT_EPISODE_RECENT_COUNT: 0
MDC_IDC_STAT_EPISODE_RECENT_COUNT: 344
MDC_IDC_STAT_EPISODE_RECENT_COUNT: 4
MDC_IDC_STAT_EPISODE_RECENT_COUNT_DTM_END: NORMAL
MDC_IDC_STAT_EPISODE_RECENT_COUNT_DTM_START: NORMAL
MDC_IDC_STAT_EPISODE_TYPE: NORMAL
MDC_IDC_STAT_EPISODE_VENDOR_TYPE: NORMAL
MDC_IDC_STAT_TACHYTHERAPY_ATP_DELIVERED_RECENT: 0
MDC_IDC_STAT_TACHYTHERAPY_ATP_DELIVERED_TOTAL: 7
MDC_IDC_STAT_TACHYTHERAPY_RECENT_DTM_END: NORMAL
MDC_IDC_STAT_TACHYTHERAPY_RECENT_DTM_START: NORMAL
MDC_IDC_STAT_TACHYTHERAPY_SHOCKS_ABORTED_RECENT: 0
MDC_IDC_STAT_TACHYTHERAPY_SHOCKS_ABORTED_TOTAL: 1
MDC_IDC_STAT_TACHYTHERAPY_SHOCKS_DELIVERED_RECENT: 0
MDC_IDC_STAT_TACHYTHERAPY_SHOCKS_DELIVERED_TOTAL: 7
MDC_IDC_STAT_TACHYTHERAPY_TOTAL_DTM_END: NORMAL
MDC_IDC_STAT_TACHYTHERAPY_TOTAL_DTM_START: NORMAL

## 2019-10-30 PROCEDURE — 93295 DEV INTERROG REMOTE 1/2/MLT: CPT | Performed by: INTERNAL MEDICINE

## 2019-11-01 LAB
ANION GAP SERPL CALCULATED.3IONS-SCNC: 11 MMOL/L
BUN SERPL-MCNC: 13 MG/DL
CALCIUM SERPL-MCNC: 8.8 MG/DL
CHLORIDE SERPLBLD-SCNC: 105 MMOL/L
CO2 SERPL-SCNC: 22 MMOL/L
CREAT SERPL-MCNC: 0.84 MG/DL
ERYTHROCYTE [DISTWIDTH] IN BLOOD BY AUTOMATED COUNT: 13.5 %
GFR SERPL CREATININE-BSD FRML MDRD: >60 ML/MIN/1.73M2
GLUCOSE SERPL-MCNC: 251 MG/DL (ref 70–99)
HCT VFR BLD AUTO: 43.4 %
HEMOGLOBIN: 14.9 G/DL (ref 13.3–17.7)
MCH RBC QN AUTO: 30.5 PG
MCHC RBC AUTO-ENTMCNC: 34.3 G/DL
MCV RBC AUTO: 89 FL
PLATELET # BLD AUTO: 241 10^9/L
POTASSIUM SERPL-SCNC: 3.8 MMOL/L
RBC # BLD AUTO: 4.88 10^12/L
SODIUM SERPL-SCNC: 138 MMOL/L
WBC # BLD AUTO: 8.5 10^9/L

## 2019-12-31 ENCOUNTER — PRE VISIT (OUTPATIENT)
Dept: CARDIOLOGY | Facility: CLINIC | Age: 69
End: 2019-12-31

## 2019-12-31 DIAGNOSIS — I25.10 CORONARY ARTERY DISEASE INVOLVING NATIVE CORONARY ARTERY OF NATIVE HEART WITHOUT ANGINA PECTORIS: ICD-10-CM

## 2019-12-31 DIAGNOSIS — I47.20 VENTRICULAR TACHYCARDIA (H): Primary | ICD-10-CM

## 2019-12-31 PROBLEM — I73.9 PAD (PERIPHERAL ARTERY DISEASE) (H): Status: ACTIVE | Noted: 2019-12-31

## 2020-01-22 ENCOUNTER — ANCILLARY PROCEDURE (OUTPATIENT)
Dept: CARDIOLOGY | Facility: CLINIC | Age: 70
End: 2020-01-22
Attending: INTERNAL MEDICINE
Payer: COMMERCIAL

## 2020-01-22 ENCOUNTER — OFFICE VISIT (OUTPATIENT)
Dept: CARDIOLOGY | Facility: CLINIC | Age: 70
End: 2020-01-22
Payer: COMMERCIAL

## 2020-01-22 VITALS
OXYGEN SATURATION: 99 % | HEIGHT: 69 IN | WEIGHT: 242.7 LBS | SYSTOLIC BLOOD PRESSURE: 138 MMHG | DIASTOLIC BLOOD PRESSURE: 82 MMHG | HEART RATE: 91 BPM | BODY MASS INDEX: 35.95 KG/M2

## 2020-01-22 DIAGNOSIS — Z79.4 TYPE 2 DIABETES MELLITUS WITH DIABETIC PERIPHERAL ANGIOPATHY AND GANGRENE, WITH LONG-TERM CURRENT USE OF INSULIN (H): ICD-10-CM

## 2020-01-22 DIAGNOSIS — I25.10 CORONARY ARTERY DISEASE INVOLVING NATIVE CORONARY ARTERY OF NATIVE HEART WITHOUT ANGINA PECTORIS: ICD-10-CM

## 2020-01-22 DIAGNOSIS — Z95.810 ICD (IMPLANTABLE CARDIOVERTER-DEFIBRILLATOR) IN PLACE: ICD-10-CM

## 2020-01-22 DIAGNOSIS — E11.52 TYPE 2 DIABETES MELLITUS WITH DIABETIC PERIPHERAL ANGIOPATHY AND GANGRENE, WITH LONG-TERM CURRENT USE OF INSULIN (H): ICD-10-CM

## 2020-01-22 DIAGNOSIS — E78.5 DYSLIPIDEMIA: ICD-10-CM

## 2020-01-22 DIAGNOSIS — Z95.810 AUTOMATIC IMPLANTABLE CARDIOVERTER-DEFIBRILLATOR IN SITU: ICD-10-CM

## 2020-01-22 DIAGNOSIS — I10 BENIGN ESSENTIAL HYPERTENSION: ICD-10-CM

## 2020-01-22 DIAGNOSIS — I47.20 VENTRICULAR TACHYCARDIA (H): Primary | ICD-10-CM

## 2020-01-22 DIAGNOSIS — Z95.1 STATUS POST AORTO-CORONARY ARTERY BYPASS GRAFT: ICD-10-CM

## 2020-01-22 DIAGNOSIS — I25.5 ISCHEMIC CARDIOMYOPATHY: Primary | ICD-10-CM

## 2020-01-22 PROBLEM — E11.10 DKA (DIABETIC KETOACIDOSIS) (H): Status: RESOLVED | Noted: 2018-10-05 | Resolved: 2020-01-22

## 2020-01-22 PROCEDURE — 99215 OFFICE O/P EST HI 40 MIN: CPT | Mod: 25 | Performed by: INTERNAL MEDICINE

## 2020-01-22 PROCEDURE — 93000 ELECTROCARDIOGRAM COMPLETE: CPT | Mod: 59 | Performed by: INTERNAL MEDICINE

## 2020-01-22 PROCEDURE — 93283 PRGRMG EVAL IMPLANTABLE DFB: CPT | Performed by: INTERNAL MEDICINE

## 2020-01-22 RX ORDER — INSULIN GLARGINE 100 [IU]/ML
16 INJECTION, SOLUTION SUBCUTANEOUS AT BEDTIME
COMMUNITY

## 2020-01-22 ASSESSMENT — MIFFLIN-ST. JEOR: SCORE: 1858.32

## 2020-01-22 NOTE — PROGRESS NOTES
Service Date: 01/22/2020      PRIMARY CARE PROVIDER:  Dr. Edwina Rodriguez      REASON FOR VISIT:  Scheduled followup of ischemic cardiomyopathy, CAD, dyslipidemia, peripheral arterial disease in the context of type 2 diabetes.      HISTORY OF PRESENT ILLNESS:    Mr. Go Saavedra is a very pleasant 69-year-old  gentleman who is a retired .  He was unaccompanied today.      Go has extensive cardiovascular history which is significant for:     1.  Premature coronary artery disease status post inferior myocardial infarction with delayed presentation 5 days later (symptoms were atypical as heartburn), leading to CABG x3 (LIMA to LAD, vein graft to diagonal and posterior descending artery) in 11/2008.  He has been angina-free since then.  There is evidence of inferior wall scar on echocardiogram and ECG.   2.  Ischemic cardiomyopathy.  Initially, his LVEF was as low as 30%-35%.  Most recent echocardiogram in 04/2019 ([facility]) shows improvement in LVEF up to 45%-50%.  He is on maximal medical therapy without any heart failure hospitalizations or exacerbation.  NYHA class I symptoms.   3.  Defibrillator in situ for nonsustained ventricular tachycardia at the time of his initial diagnosis.  He had his device interrogated today.  The final report is not available but per report from the patient, there were no arrhythmias and he has a battery longevity of approximately 1 year.   4.  Type 2 diabetes mellitus complicated by significant peripheral neuropathy and peripheral vascular disease.  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  Currently, he has another wound on his left foot.  He is closely followed by vascular specialist at [facility] for this.   5.  Nontobacco user.   6.  Dyslipidemia with a very low HDL of 22 and LDL of 112.  Total cholesterol 167, triglycerides 163.  HbA1c a little over 7%.  The  patient is supposed to be on atorvastatin 80 mg daily but has not been taking this because his wife feels strongly that statins are bad and believes in a more holistic approach.  The patient himself is conflicted and has not been taking the statin.      I last saw Go in 09/2017.  He missed his 1-year followup.  Understandably, a lot has happened in the intervening couple of years, primarily with peripheral arterial disease and toe amputation.  His BP today is 138/82 with a resting pulse of 91 BPM (although he is supposed to be on metoprolol  mg daily).  He does not exercise much.  He has chronic back pain.  He admits that he could pay more attention to his diet.  He is a lifelong nontobacco user.  He denies symptoms of angina or heart failure.      DIAGNOSTIC DATA:    Total cholesterol 167, very low HDL of 22, , triglycerides 163 (patient has not been taking statin).  Potassium 3.8, sodium 138, creatinine 0.84.  Hemoglobin 14.9.      ECG done today shows sinus rhythm of 90 BPM with old inferior infarct and a QTc of 404 milliseconds.      His last echocardiogram was done at [facility] in 04/2019.  I saw report in Care Everywhere.  Normal left ventricular size, mildly reduced systolic function with LVEF of 45%-50%, inferior inferolateral wall akinesis.  No significant valve disease. Normal right ventricular size and systolic function.      PHYSICAL EXAMINATION:     GENERAL:  He is alert, oriented, walks with a slight limp and a cane, elevated BMI of 36 kg/m2,    CHEST:  Midline well-healed sternotomy scar.  Pacemaker site satisfactory.   CARDIOVASCULAR:  Regular heart sounds, no audible murmur.    NECK:  No carotid bruit.   LUNGS:  Clear.   ABDOMEN:  Soft and nontender.      ASSESSMENT AND PLAN:    Go's cardiac issues are stable in that he does not have angina or heart failure symptoms.  His LVEF has improved from 35% of the time of initial diagnosis to 45%-50%.  He is on optimal medical therapy  with lisinopril 40 mg, metoprolol  mg daily and spironolactone 75 mg daily.  He is not requiring a loop diuretic.  I question whether he has been taking his beta blocker because despite being on a very high dose, his resting pulse is in the 90s.  I also note that the issues with statin therapy as outlined above.  His wife strongly feels that he should not be taking a statin and therefore, the patient has not been taking it.  I had a detailed conversation with him about the wealth of data backing the use of statin, particularly in a patient like him who is very high cardiovascular risk with ongoing issues with peripheral arterial disease.  He will think about it.  I note that he also sees a vascular specialist at [facility].     1.  No changes to medications today.   2.  Reiterated the use for statin therapy.   3.  Follow up in a year.  We can move echocardiographic surveillance to every 2-3 years as long as he is clinically stable.  Mr. Saavedra has many hospital and doctor appointments and testing and understandably wants to minimize these.      Total time 45 minutes, greater than 50% spent in counseling and coordination of care.         INES ANGELES MD             D: 2020   T: 2020   MT: JAMAL      Name:     AUDREY SAAVEDRA   MRN:      [id number]        Account:      [id number]   :      1950           Service Date: 2020      Document: [id number]

## 2020-01-22 NOTE — LETTER
1/22/2020    Edwina Rodriguez MD  [facility] [address] [place] [address]    RE: Go Saavedra       Dear Colleague,    I had the pleasure of seeing Go Saavedra in the [facility].    Clinic visit note dictated. Dictation reference number - [id number]          REVIEW OF SYSTEMS:  A comprehensive 10-point review of systems was completed and the pertinent positives are documented in the history of present illness.    Skin:  Negative     Eyes:  Positive for glasses  ENT:  Negative    Respiratory:  Negative    Cardiovascular:  Negative    Gastroenterology: Negative    Genitourinary:  Negative    Musculoskeletal:  Positive for foot pain(rt foot, had some toes removed )  Neurologic:  Positive for numbness or tingling of feet(neuropathy)  Psychiatric:  Negative    Heme/Lymph/Imm:  Negative    Endocrine:  Positive for diabetes    CURRENT MEDICATIONS:  Current Outpatient Medications   Medication Sig Dispense Refill     Aspirin (ASPIR-81 PO) Take 81 mg by mouth daily        Gabapentin (NEURONTIN PO) Take 900 mg by mouth daily        insulin aspart (NOVOLOG FLEXPEN) 100 UNIT/ML injection Inject 26 Units Subcutaneous 2 times daily (with meals)        insulin glargine (BASAGLAR KWIKPEN) 100 UNIT/ML pen Inject Subcutaneous daily       insulin pen needle (NOVOFINE) 30G X 8 MM Use 5 pen needles daily or as directed.       lisinopril (PRINIVIL/ZESTRIL) 40 MG tablet Take 40 mg by mouth daily       metFORMIN (GLUCOPHAGE-XR) 500 MG 24 hr tablet Take 500 mg by mouth daily (with dinner)       metoprolol (TOPROL-XL) 100 MG 24 hr tablet Take 200 mg by mouth daily       nitroGLYcerin (NITROSTAT) 0.4 MG sublingual tablet Place 0.4 mg under the tongue every 5 minutes as needed for chest pain For chest pain place 1 tablet under the tongue every 5 minutes for 3 doses. If symptoms persist 5 minutes after 1st dose call 911.       ondansetron (ZOFRAN ODT) 4 MG ODT tab Take 1-2  tablets (4-8 mg) by mouth every 8 hours as needed for nausea 15 tablet 0     spironolactone (ALDACTONE) 25 MG tablet Take 75 mg by mouth daily        atorvastatin (LIPITOR) 80 MG tablet Take 80 mg by mouth daily       Gabapentin (NEURONTIN PO) Take 600 mg by mouth every morning       insulin glargine (LANTUS SOLOSTAR) 100 UNIT/ML pen Inject 30 Units Subcutaneous every morning           ALLERGIES:  No Known Allergies    PAST MEDICAL HISTORY:    Past Medical History:   Diagnosis Date     Coronary artery disease      Diabetes mellitus (H)      History of coronary artery bypass graft x 3 2008     Hyperlipidemia      Ischemic cardiomyopathy 2008     MI (myocardial infarction) (H) 11/2008    inferior     PAD (peripheral artery disease) (H) 12/31/2019    S/p angioplasty of right posterior tibial artery on 2/19/2019; s/p angioplasty of distal posterior tibial artery to right foot 4/4/19       Ventricular tachycardia (H)        PAST SURGICAL HISTORY:    Past Surgical History:   Procedure Laterality Date     BYPASS GRAFT ARTERY CORONARY  11/2008    3 vessel w/ LIMA to LAD, SVG to Diagl, SVG to PDA     ENDOSCOPIC RETROGRADE CHOLANGIOPANCREATOGRAM N/A 10/6/2018    Procedure: ENDOSCOPIC RETROGRADE CHOLANGIOPANCREATOGRAM;  ENDOSCOPIC ULTRASOUND, ENDOSCOPIC RETROGRADE CHOLANGIOPANCREATOGRAM (MAC);  Surgeon: Oc Yang MD;  Location:  OR     ESOPHAGOSCOPY, GASTROSCOPY, DUODENOSCOPY (EGD), COMBINED N/A 10/6/2018    Procedure: COMBINED ENDOSCOPIC ULTRASOUND, ESOPHAGOSCOPY, GASTROSCOPY, DUODENOSCOPY (EGD);;  Surgeon: Oc Yang MD;  Location:  OR      LEFT HEART CATHETERIZATION  10/2008    Emergent thrombectomy and stent to distal RCA     IMPLANT IMPLANTABLE CARDIOVERTER DEFIBRILLATOR  11/2008    dual chamber ICD     LAPAROSCOPIC CHOLECYSTECTOMY N/A 10/7/2018    Procedure: LAPAROSCOPIC CHOLECYSTECTOMY;   Laparoscopic Cholecystectomy;  Surgeon: Dhiraj Matute MD;  Location:  OR       [facility]  "HISTORY:    Family History   Problem Relation Age of Onset     Cancer Brother      Diabetes No family hx of      Hypertension No family hx of        SOCIAL HISTORY:    Social History     Socioeconomic History     Marital status:      Spouse name: None     Number of children: None     Years of education: None     Highest education level: None   Occupational History     None   Social Needs     Financial resource strain: None     Food insecurity:     Worry: None     Inability: None     Transportation needs:     Medical: None     Non-medical: None   Tobacco Use     Smoking status: Never Smoker     Smokeless tobacco: Never Used   Substance and Sexual Activity     Alcohol use: No     Drug use: None     Sexual activity: None   Lifestyle     Physical activity:     Days per week: None     Minutes per session: None     Stress: None   Relationships     Social connections:     Talks on phone: None     Gets together: None     Attends Faith service: None     Active member of club or organization: None     Attends meetings of clubs or organizations: None     Relationship status: None     Intimate partner violence:     Fear of current or ex partner: None     Emotionally abused: None     Physically abused: None     Forced sexual activity: None   Other Topics Concern     Parent/sibling w/ CABG, MI or angioplasty before 65F 55M? Not Asked   Social History Narrative     None       PHYSICAL EXAM:    Vitals: /82   Pulse 91   Ht 1.74 m (5' 8.5\")   Wt 110.1 kg (242 lb 11.2 oz)   SpO2 99%   BMI 36.37 kg/m     Wt Readings from Last 5 Encounters:   01/22/20 110.1 kg (242 lb 11.2 oz)   10/08/18 117 kg (257 lb 15 oz)   10/26/17 117.1 kg (258 lb 1.6 oz)   09/29/17 118.8 kg (261 lb 12.8 oz)             Encounter Diagnoses   Name Primary?     Ischemic cardiomyopathy Yes     Coronary artery disease involving native coronary artery of native heart without angina pectoris      Status post aorto-coronary artery bypass graft      " Dyslipidemia      Benign essential hypertension      Type 2 diabetes mellitus with diabetic peripheral angiopathy and gangrene, with long-term current use of insulin (H)      Automatic implantable cardioverter-defibrillator in situ        Orders Placed This Encounter   Procedures     Basic metabolic panel     Lipid Profile     CBC with platelets differential     Follow-Up with Cardiologist     EKG 12-lead complete w/read - Clinics (performed today)     EKG 12-lead complete w/read - Clinics                 Thank you for allowing me to participate in the care of your patient.      Sincerely,     Nga Deleon MD     [facility]    cc:   No referring provider defined for this encounter.

## 2020-01-22 NOTE — LETTER
1/22/2020      Edwina Rodriguez MD  [facility] [address] [place] [address]      RE: Go Saavedra       Dear Colleague,    I had the pleasure of seeing Go Saavedra in the [facility].    Service Date: 01/22/2020      PRIMARY CARE PROVIDER:  Dr. Edwina Rodriguez      REASON FOR VISIT:  Scheduled followup of ischemic cardiomyopathy, CAD, dyslipidemia, peripheral arterial disease in the context of type 2 diabetes.      HISTORY OF PRESENT ILLNESS:    Mr. Go Saavedra is a very pleasant 69-year-old  gentleman who is a retired .  He was unaccompanied today.      Go has extensive cardiovascular history which is significant for:     1.  Premature coronary artery disease status post inferior myocardial infarction with delayed presentation 5 days later (symptoms were atypical as heartburn), leading to CABG x3 (LIMA to LAD, vein graft to diagonal and posterior descending artery) in 11/2008.  He has been angina-free since then.  There is evidence of inferior wall scar on echocardiogram and ECG.   2.  Ischemic cardiomyopathy.  Initially, his LVEF was as low as 30%-35%.  Most recent echocardiogram in 04/2019 ([facility]) shows improvement in LVEF up to 45%-50%.  He is on maximal medical therapy without any heart failure hospitalizations or exacerbation.  NYHA class I symptoms.   3.  Defibrillator in situ for nonsustained ventricular tachycardia at the time of his initial diagnosis.  He had his device interrogated today.  The final report is not available but per report from the patient, there were no arrhythmias and he has a battery longevity of approximately 1 year.   4.  Type 2 diabetes mellitus complicated by significant peripheral neuropathy and peripheral vascular disease.  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  Currently, he has another  wound on his left foot.  He is closely followed by vascular specialist at [facility] for this.   5.  Nontobacco user.   6.  Dyslipidemia with a very low HDL of 22 and LDL of 112.  Total cholesterol 167, triglycerides 163.  HbA1c a little over 7%.  The patient is supposed to be on atorvastatin 80 mg daily but has not been taking this because his wife feels strongly that statins are bad and believes in a more holistic approach.  The patient himself is conflicted and has not been taking the statin.      I last saw Go in 09/2017.  He missed his 1-year followup.  Understandably, a lot has happened in the intervening couple of years, primarily with peripheral arterial disease and toe amputation.  His BP today is 138/82 with a resting pulse of 91 BPM (although he is supposed to be on metoprolol  mg daily).  He does not exercise much.  He has chronic back pain.  He admits that he could pay more attention to his diet.  He is a lifelong nontobacco user.  He denies symptoms of angina or heart failure.      DIAGNOSTIC DATA:    Total cholesterol 167, very low HDL of 22, , triglycerides 163 (patient has not been taking statin).  Potassium 3.8, sodium 138, creatinine 0.84.  Hemoglobin 14.9.      ECG done today shows sinus rhythm of 90 BPM with old inferior infarct and a QTc of 404 milliseconds.      His last echocardiogram was done at [facility] in 04/2019.  I saw report in Care Everywhere.  Normal left ventricular size, mildly reduced systolic function with LVEF of 45%-50%, inferior inferolateral wall akinesis.  No significant valve disease. Normal right ventricular size and systolic function.      PHYSICAL EXAMINATION:     GENERAL:  He is alert, oriented, walks with a slight limp and a cane, elevated BMI of 36 kg/m2,    CHEST:  Midline well-healed sternotomy scar.  Pacemaker site satisfactory.   CARDIOVASCULAR:  Regular heart sounds, no audible murmur.    NECK:  No carotid bruit.   LUNGS:  Clear.   ABDOMEN:   Soft and nontender.      ASSESSMENT AND PLAN:    Go's cardiac issues are stable in that he does not have angina or heart failure symptoms.  His LVEF has improved from 35% of the time of initial diagnosis to 45%-50%.  He is on optimal medical therapy with lisinopril 40 mg, metoprolol  mg daily and spironolactone 75 mg daily.  He is not requiring a loop diuretic.  I question whether he has been taking his beta blocker because despite being on a very high dose, his resting pulse is in the 90s.  I also note that the issues with statin therapy as outlined above.  His wife strongly feels that he should not be taking a statin and therefore, the patient has not been taking it.  I had a detailed conversation with him about the wealth of data backing the use of statin, particularly in a patient like him who is very high cardiovascular risk with ongoing issues with peripheral arterial disease.  He will think about it.  I note that he also sees a vascular specialist at [facility].     1.  No changes to medications today.   2.  Reiterated the use for statin therapy.   3.  Follow up in a year.  We can move echocardiographic surveillance to every 2-3 years as long as he is clinically stable.  Mr. Saavedra has many hospital and doctor appointments and testing and understandably wants to minimize these.          Total time 45 minutes, greater than 50% spent in counseling and coordination of care.         INES ANGELES MD             D: 2020   T: 2020   MT: JAMAL      Name:     GO SAAVEDRA   MRN:      [id number]        Account:      [id number]   :      1950           Service Date: 2020      Document: [id number]           Outpatient Encounter Medications as of 2020   Medication Sig Dispense Refill     Aspirin (ASPIR-81 PO) Take 81 mg by mouth daily        Gabapentin (NEURONTIN PO) Take 900 mg by mouth daily        insulin aspart (NOVOLOG FLEXPEN) 100 UNIT/ML injection Inject 26 Units  Subcutaneous 2 times daily (with meals)        insulin glargine (BASAGLAR KWIKPEN) 100 UNIT/ML pen Inject Subcutaneous daily       insulin pen needle (NOVOFINE) 30G X 8 MM Use 5 pen needles daily or as directed.       lisinopril (PRINIVIL/ZESTRIL) 40 MG tablet Take 40 mg by mouth daily       metFORMIN (GLUCOPHAGE-XR) 500 MG 24 hr tablet Take 500 mg by mouth daily (with dinner)       metoprolol (TOPROL-XL) 100 MG 24 hr tablet Take 200 mg by mouth daily       nitroGLYcerin (NITROSTAT) 0.4 MG sublingual tablet Place 0.4 mg under the tongue every 5 minutes as needed for chest pain For chest pain place 1 tablet under the tongue every 5 minutes for 3 doses. If symptoms persist 5 minutes after 1st dose call 911.       ondansetron (ZOFRAN ODT) 4 MG ODT tab Take 1-2 tablets (4-8 mg) by mouth every 8 hours as needed for nausea 15 tablet 0     spironolactone (ALDACTONE) 25 MG tablet Take 75 mg by mouth daily        atorvastatin (LIPITOR) 80 MG tablet Take 80 mg by mouth daily       Gabapentin (NEURONTIN PO) Take 600 mg by mouth every morning       insulin glargine (LANTUS SOLOSTAR) 100 UNIT/ML pen Inject 30 Units Subcutaneous every morning       [DISCONTINUED] oxyCODONE IR (ROXICODONE) 5 MG tablet Take 1-2 tablets (5-10 mg) by mouth every 4 hours as needed for moderate to severe pain (Patient not taking: Reported on 1/22/2020) 20 tablet 0     No facility-administered encounter medications on file as of 1/22/2020.        Again, thank you for allowing me to participate in the care of your patient.      Sincerely,    Nga Deleon MD     [facility]

## 2020-01-22 NOTE — PROGRESS NOTES
Clinic visit note dictated. Dictation reference number - [id number]          REVIEW OF SYSTEMS:  A comprehensive 10-point review of systems was completed and the pertinent positives are documented in the history of present illness.    Skin:  Negative     Eyes:  Positive for glasses  ENT:  Negative    Respiratory:  Negative    Cardiovascular:  Negative    Gastroenterology: Negative    Genitourinary:  Negative    Musculoskeletal:  Positive for foot pain(rt foot, had some toes removed )  Neurologic:  Positive for numbness or tingling of feet(neuropathy)  Psychiatric:  Negative    Heme/Lymph/Imm:  Negative    Endocrine:  Positive for diabetes    CURRENT MEDICATIONS:  Current Outpatient Medications   Medication Sig Dispense Refill     Aspirin (ASPIR-81 PO) Take 81 mg by mouth daily        Gabapentin (NEURONTIN PO) Take 900 mg by mouth daily        insulin aspart (NOVOLOG FLEXPEN) 100 UNIT/ML injection Inject 26 Units Subcutaneous 2 times daily (with meals)        insulin glargine (BASAGLAR KWIKPEN) 100 UNIT/ML pen Inject Subcutaneous daily       insulin pen needle (NOVOFINE) 30G X 8 MM Use 5 pen needles daily or as directed.       lisinopril (PRINIVIL/ZESTRIL) 40 MG tablet Take 40 mg by mouth daily       metFORMIN (GLUCOPHAGE-XR) 500 MG 24 hr tablet Take 500 mg by mouth daily (with dinner)       metoprolol (TOPROL-XL) 100 MG 24 hr tablet Take 200 mg by mouth daily       nitroGLYcerin (NITROSTAT) 0.4 MG sublingual tablet Place 0.4 mg under the tongue every 5 minutes as needed for chest pain For chest pain place 1 tablet under the tongue every 5 minutes for 3 doses. If symptoms persist 5 minutes after 1st dose call 911.       ondansetron (ZOFRAN ODT) 4 MG ODT tab Take 1-2 tablets (4-8 mg) by mouth every 8 hours as needed for nausea 15 tablet 0     spironolactone (ALDACTONE) 25 MG tablet Take 75 mg by mouth daily        atorvastatin (LIPITOR) 80 MG tablet Take 80 mg by mouth daily       Gabapentin (NEURONTIN PO) Take 600 mg by  mouth every morning       insulin glargine (LANTUS SOLOSTAR) 100 UNIT/ML pen Inject 30 Units Subcutaneous every morning           ALLERGIES:  No Known Allergies    PAST MEDICAL HISTORY:    Past Medical History:   Diagnosis Date     Coronary artery disease      Diabetes mellitus (H)      History of coronary artery bypass graft x 3 2008     Hyperlipidemia      Ischemic cardiomyopathy 2008     MI (myocardial infarction) (H) 11/2008    inferior     PAD (peripheral artery disease) (H) 12/31/2019    S/p angioplasty of right posterior tibial artery on 2/19/2019; s/p angioplasty of distal posterior tibial artery to right foot 4/4/19       Ventricular tachycardia (H)        PAST SURGICAL HISTORY:    Past Surgical History:   Procedure Laterality Date     BYPASS GRAFT ARTERY CORONARY  11/2008    3 vessel w/ LIMA to LAD, SVG to Diagl, SVG to PDA     ENDOSCOPIC RETROGRADE CHOLANGIOPANCREATOGRAM N/A 10/6/2018    Procedure: ENDOSCOPIC RETROGRADE CHOLANGIOPANCREATOGRAM;  ENDOSCOPIC ULTRASOUND, ENDOSCOPIC RETROGRADE CHOLANGIOPANCREATOGRAM (MAC);  Surgeon: Oc Yang MD;  Location:  OR     ESOPHAGOSCOPY, GASTROSCOPY, DUODENOSCOPY (EGD), COMBINED N/A 10/6/2018    Procedure: COMBINED ENDOSCOPIC ULTRASOUND, ESOPHAGOSCOPY, GASTROSCOPY, DUODENOSCOPY (EGD);;  Surgeon: Oc Yang MD;  Location:  OR      LEFT HEART CATHETERIZATION  10/2008    Emergent thrombectomy and stent to distal RCA     IMPLANT IMPLANTABLE CARDIOVERTER DEFIBRILLATOR  11/2008    dual chamber ICD     LAPAROSCOPIC CHOLECYSTECTOMY N/A 10/7/2018    Procedure: LAPAROSCOPIC CHOLECYSTECTOMY;   Laparoscopic Cholecystectomy;  Surgeon: Dhiraj Matute MD;  Location:  OR       FAMILY HISTORY:    Family History   Problem Relation Age of Onset     Cancer Brother      Diabetes No family hx of      Hypertension No family hx of        SOCIAL HISTORY:    Social History     Socioeconomic History     Marital status:      Spouse name: None      "Number of children: None     Years of education: None     Highest education level: None   Occupational History     None   Social Needs     Financial resource strain: None     Food insecurity:     Worry: None     Inability: None     Transportation needs:     Medical: None     Non-medical: None   Tobacco Use     Smoking status: Never Smoker     Smokeless tobacco: Never Used   Substance and Sexual Activity     Alcohol use: No     Drug use: None     Sexual activity: None   Lifestyle     Physical activity:     Days per week: None     Minutes per session: None     Stress: None   Relationships     Social connections:     Talks on phone: None     Gets together: None     Attends Mandaen service: None     Active member of club or organization: None     Attends meetings of clubs or organizations: None     Relationship status: None     Intimate partner violence:     Fear of current or ex partner: None     Emotionally abused: None     Physically abused: None     Forced sexual activity: None   Other Topics Concern     Parent/sibling w/ CABG, MI or angioplasty before 65F 55M? Not Asked   Social History Narrative     None       PHYSICAL EXAM:    Vitals: /82   Pulse 91   Ht 1.74 m (5' 8.5\")   Wt 110.1 kg (242 lb 11.2 oz)   SpO2 99%   BMI 36.37 kg/m    Wt Readings from Last 5 Encounters:   01/22/20 110.1 kg (242 lb 11.2 oz)   10/08/18 117 kg (257 lb 15 oz)   10/26/17 117.1 kg (258 lb 1.6 oz)   09/29/17 118.8 kg (261 lb 12.8 oz)             Encounter Diagnoses   Name Primary?     Ischemic cardiomyopathy Yes     Coronary artery disease involving native coronary artery of native heart without angina pectoris      Status post aorto-coronary artery bypass graft      Dyslipidemia      Benign essential hypertension      Type 2 diabetes mellitus with diabetic peripheral angiopathy and gangrene, with long-term current use of insulin (H)      Automatic implantable cardioverter-defibrillator in situ        Orders Placed This " Encounter   Procedures     Basic metabolic panel     Lipid Profile     CBC with platelets differential     Follow-Up with Cardiologist     EKG 12-lead complete w/read - Clinics (performed today)     EKG 12-lead complete w/read - Clinics

## 2020-02-04 LAB
MDC_IDC_LEAD_IMPLANT_DT: NORMAL
MDC_IDC_LEAD_IMPLANT_DT: NORMAL
MDC_IDC_LEAD_LOCATION: NORMAL
MDC_IDC_LEAD_LOCATION: NORMAL
MDC_IDC_LEAD_LOCATION_DETAIL_1: NORMAL
MDC_IDC_LEAD_LOCATION_DETAIL_1: NORMAL
MDC_IDC_LEAD_MFG: NORMAL
MDC_IDC_LEAD_MFG: NORMAL
MDC_IDC_LEAD_MODEL: NORMAL
MDC_IDC_LEAD_MODEL: NORMAL
MDC_IDC_LEAD_POLARITY_TYPE: NORMAL
MDC_IDC_LEAD_POLARITY_TYPE: NORMAL
MDC_IDC_LEAD_SERIAL: NORMAL
MDC_IDC_LEAD_SERIAL: NORMAL
MDC_IDC_MSMT_BATTERY_STATUS: NORMAL
MDC_IDC_MSMT_CAP_CHARGE_DTM: NORMAL
MDC_IDC_MSMT_CAP_CHARGE_ENERGY: 41 J
MDC_IDC_MSMT_CAP_CHARGE_TIME: 12.04
MDC_IDC_MSMT_CAP_CHARGE_TIME: 7.48 S
MDC_IDC_MSMT_CAP_CHARGE_TYPE: NORMAL
MDC_IDC_MSMT_CAP_CHARGE_TYPE: NORMAL
MDC_IDC_MSMT_LEADCHNL_RA_IMPEDANCE_VALUE: 619 OHM
MDC_IDC_MSMT_LEADCHNL_RA_PACING_THRESHOLD_AMPLITUDE: 0.9 V
MDC_IDC_MSMT_LEADCHNL_RA_PACING_THRESHOLD_PULSEWIDTH: 0.5 MS
MDC_IDC_MSMT_LEADCHNL_RA_SENSING_INTR_AMPL: 7.9 MV
MDC_IDC_MSMT_LEADCHNL_RV_IMPEDANCE_VALUE: 405 OHM
MDC_IDC_MSMT_LEADCHNL_RV_PACING_THRESHOLD_AMPLITUDE: 0.9 V
MDC_IDC_MSMT_LEADCHNL_RV_PACING_THRESHOLD_PULSEWIDTH: 0.5 MS
MDC_IDC_MSMT_LEADCHNL_RV_SENSING_INTR_AMPL: 6.8 MV
MDC_IDC_PG_IMPLANT_DTM: NORMAL
MDC_IDC_PG_MFG: NORMAL
MDC_IDC_PG_MODEL: NORMAL
MDC_IDC_PG_SERIAL: 7643
MDC_IDC_PG_TYPE: NORMAL
MDC_IDC_SESS_CLINIC_NAME: NORMAL
MDC_IDC_SESS_DTM: NORMAL
MDC_IDC_SESS_TYPE: NORMAL
MDC_IDC_SET_BRADY_AT_MODE_SWITCH_MODE: NORMAL
MDC_IDC_SET_BRADY_AT_MODE_SWITCH_RATE: 170 {BEATS}/MIN
MDC_IDC_SET_BRADY_HYSTRATE: NORMAL
MDC_IDC_SET_BRADY_LOWRATE: 60 {BEATS}/MIN
MDC_IDC_SET_BRADY_MAX_SENSOR_RATE: 130 {BEATS}/MIN
MDC_IDC_SET_BRADY_MAX_TRACKING_RATE: 130 {BEATS}/MIN
MDC_IDC_SET_BRADY_MODE: NORMAL
MDC_IDC_SET_BRADY_PAV_DELAY_HIGH: 220 MS
MDC_IDC_SET_BRADY_PAV_DELAY_LOW: 400 MS
MDC_IDC_SET_BRADY_SAV_DELAY_HIGH: 220 MS
MDC_IDC_SET_BRADY_SAV_DELAY_LOW: 400 MS
MDC_IDC_SET_LEADCHNL_RA_PACING_AMPLITUDE: 2 V
MDC_IDC_SET_LEADCHNL_RA_PACING_ANODE_ELECTRODE_1: NORMAL
MDC_IDC_SET_LEADCHNL_RA_PACING_ANODE_LOCATION_1: NORMAL
MDC_IDC_SET_LEADCHNL_RA_PACING_CAPTURE_MODE: NORMAL
MDC_IDC_SET_LEADCHNL_RA_PACING_CATHODE_ELECTRODE_1: NORMAL
MDC_IDC_SET_LEADCHNL_RA_PACING_CATHODE_LOCATION_1: NORMAL
MDC_IDC_SET_LEADCHNL_RA_PACING_POLARITY: NORMAL
MDC_IDC_SET_LEADCHNL_RA_PACING_PULSEWIDTH: 0.5 MS
MDC_IDC_SET_LEADCHNL_RA_SENSING_ADAPTATION_MODE: NORMAL
MDC_IDC_SET_LEADCHNL_RA_SENSING_ANODE_ELECTRODE_1: NORMAL
MDC_IDC_SET_LEADCHNL_RA_SENSING_ANODE_LOCATION_1: NORMAL
MDC_IDC_SET_LEADCHNL_RA_SENSING_CATHODE_ELECTRODE_1: NORMAL
MDC_IDC_SET_LEADCHNL_RA_SENSING_CATHODE_LOCATION_1: NORMAL
MDC_IDC_SET_LEADCHNL_RA_SENSING_POLARITY: NORMAL
MDC_IDC_SET_LEADCHNL_RA_SENSING_SENSITIVITY: 0.25 MV
MDC_IDC_SET_LEADCHNL_RV_PACING_AMPLITUDE: 2 V
MDC_IDC_SET_LEADCHNL_RV_PACING_ANODE_ELECTRODE_1: NORMAL
MDC_IDC_SET_LEADCHNL_RV_PACING_ANODE_LOCATION_1: NORMAL
MDC_IDC_SET_LEADCHNL_RV_PACING_CAPTURE_MODE: NORMAL
MDC_IDC_SET_LEADCHNL_RV_PACING_CATHODE_ELECTRODE_1: NORMAL
MDC_IDC_SET_LEADCHNL_RV_PACING_CATHODE_LOCATION_1: NORMAL
MDC_IDC_SET_LEADCHNL_RV_PACING_POLARITY: NORMAL
MDC_IDC_SET_LEADCHNL_RV_PACING_PULSEWIDTH: 0.5 MS
MDC_IDC_SET_LEADCHNL_RV_SENSING_ADAPTATION_MODE: NORMAL
MDC_IDC_SET_LEADCHNL_RV_SENSING_ANODE_ELECTRODE_1: NORMAL
MDC_IDC_SET_LEADCHNL_RV_SENSING_ANODE_LOCATION_1: NORMAL
MDC_IDC_SET_LEADCHNL_RV_SENSING_CATHODE_ELECTRODE_1: NORMAL
MDC_IDC_SET_LEADCHNL_RV_SENSING_CATHODE_LOCATION_1: NORMAL
MDC_IDC_SET_LEADCHNL_RV_SENSING_POLARITY: NORMAL
MDC_IDC_SET_LEADCHNL_RV_SENSING_SENSITIVITY: 0.6 MV
MDC_IDC_SET_ZONE_DETECTION_INTERVAL: 300 MS
MDC_IDC_SET_ZONE_DETECTION_INTERVAL: 353 MS
MDC_IDC_SET_ZONE_DETECTION_INTERVAL: 400 MS
MDC_IDC_SET_ZONE_TYPE: NORMAL
MDC_IDC_SET_ZONE_VENDOR_TYPE: NORMAL
MDC_IDC_STAT_EPISODE_RECENT_COUNT: 7
MDC_IDC_STAT_EPISODE_RECENT_COUNT: 7
MDC_IDC_STAT_EPISODE_RECENT_COUNT: 8
MDC_IDC_STAT_EPISODE_RECENT_COUNT_DTM_END: NORMAL
MDC_IDC_STAT_EPISODE_RECENT_COUNT_DTM_START: NORMAL
MDC_IDC_STAT_EPISODE_TOTAL_COUNT: 145
MDC_IDC_STAT_EPISODE_TOTAL_COUNT: 52
MDC_IDC_STAT_EPISODE_TOTAL_COUNT: 57
MDC_IDC_STAT_EPISODE_TOTAL_COUNT_DTM_END: NORMAL
MDC_IDC_STAT_EPISODE_TYPE: NORMAL
MDC_IDC_STAT_EPISODE_VENDOR_TYPE: NORMAL
MDC_IDC_STAT_TACHYTHERAPY_ATP_DELIVERED_RECENT: 0
MDC_IDC_STAT_TACHYTHERAPY_ATP_DELIVERED_TOTAL: 7
MDC_IDC_STAT_TACHYTHERAPY_RECENT_DTM_END: NORMAL
MDC_IDC_STAT_TACHYTHERAPY_RECENT_DTM_START: NORMAL
MDC_IDC_STAT_TACHYTHERAPY_SHOCKS_ABORTED_RECENT: 0
MDC_IDC_STAT_TACHYTHERAPY_SHOCKS_ABORTED_TOTAL: 1
MDC_IDC_STAT_TACHYTHERAPY_SHOCKS_DELIVERED_RECENT: 0
MDC_IDC_STAT_TACHYTHERAPY_SHOCKS_DELIVERED_TOTAL: 7
MDC_IDC_STAT_TACHYTHERAPY_TOTAL_DTM_END: NORMAL

## 2020-04-23 ENCOUNTER — ANCILLARY PROCEDURE (OUTPATIENT)
Dept: CARDIOLOGY | Facility: CLINIC | Age: 70
End: 2020-04-23
Attending: INTERNAL MEDICINE
Payer: COMMERCIAL

## 2020-04-23 DIAGNOSIS — Z95.810 ICD (IMPLANTABLE CARDIOVERTER-DEFIBRILLATOR) IN PLACE: ICD-10-CM

## 2020-04-23 DIAGNOSIS — I47.20 VENTRICULAR TACHYCARDIA (H): ICD-10-CM

## 2020-04-23 PROCEDURE — 93295 DEV INTERROG REMOTE 1/2/MLT: CPT | Performed by: INTERNAL MEDICINE

## 2020-04-23 PROCEDURE — 93296 REM INTERROG EVL PM/IDS: CPT | Performed by: INTERNAL MEDICINE

## 2020-04-26 LAB
MDC_IDC_EPISODE_DTM: NORMAL
MDC_IDC_EPISODE_DURATION: 1 S
MDC_IDC_EPISODE_DURATION: 2 S
MDC_IDC_EPISODE_DURATION: 201 S
MDC_IDC_EPISODE_DURATION: 218 S
MDC_IDC_EPISODE_DURATION: 3 S
MDC_IDC_EPISODE_DURATION: 314 S
MDC_IDC_EPISODE_DURATION: 4 S
MDC_IDC_EPISODE_DURATION: 5 S
MDC_IDC_EPISODE_DURATION: 8 S
MDC_IDC_EPISODE_ID: NORMAL
MDC_IDC_EPISODE_TYPE: NORMAL
MDC_IDC_EPISODE_VENDOR_TYPE: NORMAL
MDC_IDC_LEAD_IMPLANT_DT: NORMAL
MDC_IDC_LEAD_IMPLANT_DT: NORMAL
MDC_IDC_LEAD_LOCATION: NORMAL
MDC_IDC_LEAD_LOCATION: NORMAL
MDC_IDC_LEAD_LOCATION_DETAIL_1: NORMAL
MDC_IDC_LEAD_LOCATION_DETAIL_1: NORMAL
MDC_IDC_LEAD_MFG: NORMAL
MDC_IDC_LEAD_MFG: NORMAL
MDC_IDC_LEAD_MODEL: NORMAL
MDC_IDC_LEAD_MODEL: NORMAL
MDC_IDC_LEAD_POLARITY_TYPE: NORMAL
MDC_IDC_LEAD_POLARITY_TYPE: NORMAL
MDC_IDC_LEAD_SERIAL: NORMAL
MDC_IDC_LEAD_SERIAL: NORMAL
MDC_IDC_MSMT_BATTERY_DTM: NORMAL
MDC_IDC_MSMT_BATTERY_REMAINING_LONGEVITY: 9 MO
MDC_IDC_MSMT_BATTERY_REMAINING_PERCENTAGE: 10 %
MDC_IDC_MSMT_BATTERY_STATUS: NORMAL
MDC_IDC_MSMT_CAP_CHARGE_DTM: NORMAL
MDC_IDC_MSMT_CAP_CHARGE_DTM: NORMAL
MDC_IDC_MSMT_CAP_CHARGE_ENERGY: 41 J
MDC_IDC_MSMT_CAP_CHARGE_TIME: 12 S
MDC_IDC_MSMT_CAP_CHARGE_TIME: 7.5 S
MDC_IDC_MSMT_CAP_CHARGE_TYPE: NORMAL
MDC_IDC_MSMT_CAP_CHARGE_TYPE: NORMAL
MDC_IDC_MSMT_LEADCHNL_RA_IMPEDANCE_VALUE: 551 OHM
MDC_IDC_MSMT_LEADCHNL_RA_PACING_THRESHOLD_AMPLITUDE: 0.9 V
MDC_IDC_MSMT_LEADCHNL_RA_PACING_THRESHOLD_PULSEWIDTH: 0.5 MS
MDC_IDC_MSMT_LEADCHNL_RV_IMPEDANCE_VALUE: 364 OHM
MDC_IDC_MSMT_LEADCHNL_RV_PACING_THRESHOLD_AMPLITUDE: 0.9 V
MDC_IDC_MSMT_LEADCHNL_RV_PACING_THRESHOLD_PULSEWIDTH: 0.5 MS
MDC_IDC_PG_IMPLANT_DTM: NORMAL
MDC_IDC_PG_MFG: NORMAL
MDC_IDC_PG_MODEL: NORMAL
MDC_IDC_PG_SERIAL: 7643
MDC_IDC_PG_TYPE: NORMAL
MDC_IDC_SESS_CLINIC_NAME: NORMAL
MDC_IDC_SESS_DTM: NORMAL
MDC_IDC_SESS_TYPE: NORMAL
MDC_IDC_SET_BRADY_AT_MODE_SWITCH_MODE: NORMAL
MDC_IDC_SET_BRADY_AT_MODE_SWITCH_RATE: 170 {BEATS}/MIN
MDC_IDC_SET_BRADY_LOWRATE: 60 {BEATS}/MIN
MDC_IDC_SET_BRADY_MAX_SENSOR_RATE: 130 {BEATS}/MIN
MDC_IDC_SET_BRADY_MAX_TRACKING_RATE: 130 {BEATS}/MIN
MDC_IDC_SET_BRADY_MODE: NORMAL
MDC_IDC_SET_BRADY_PAV_DELAY_HIGH: 220 MS
MDC_IDC_SET_BRADY_PAV_DELAY_LOW: 400 MS
MDC_IDC_SET_BRADY_SAV_DELAY_HIGH: 220 MS
MDC_IDC_SET_BRADY_SAV_DELAY_LOW: 400 MS
MDC_IDC_SET_LEADCHNL_RA_PACING_AMPLITUDE: 2 V
MDC_IDC_SET_LEADCHNL_RA_PACING_POLARITY: NORMAL
MDC_IDC_SET_LEADCHNL_RA_PACING_PULSEWIDTH: 0.5 MS
MDC_IDC_SET_LEADCHNL_RA_SENSING_ADAPTATION_MODE: NORMAL
MDC_IDC_SET_LEADCHNL_RA_SENSING_POLARITY: NORMAL
MDC_IDC_SET_LEADCHNL_RA_SENSING_SENSITIVITY: 0.25 MV
MDC_IDC_SET_LEADCHNL_RV_PACING_AMPLITUDE: 2 V
MDC_IDC_SET_LEADCHNL_RV_PACING_POLARITY: NORMAL
MDC_IDC_SET_LEADCHNL_RV_PACING_PULSEWIDTH: 0.5 MS
MDC_IDC_SET_LEADCHNL_RV_SENSING_ADAPTATION_MODE: NORMAL
MDC_IDC_SET_LEADCHNL_RV_SENSING_POLARITY: NORMAL
MDC_IDC_SET_LEADCHNL_RV_SENSING_SENSITIVITY: 0.6 MV
MDC_IDC_SET_ZONE_DETECTION_INTERVAL: 300 MS
MDC_IDC_SET_ZONE_DETECTION_INTERVAL: 353 MS
MDC_IDC_SET_ZONE_DETECTION_INTERVAL: 400 MS
MDC_IDC_SET_ZONE_TYPE: NORMAL
MDC_IDC_SET_ZONE_VENDOR_TYPE: NORMAL
MDC_IDC_STAT_BRADY_DTM_END: NORMAL
MDC_IDC_STAT_BRADY_DTM_START: NORMAL
MDC_IDC_STAT_BRADY_RA_PERCENT_PACED: 7 %
MDC_IDC_STAT_BRADY_RV_PERCENT_PACED: 0 %
MDC_IDC_STAT_EPISODE_RECENT_COUNT: 0
MDC_IDC_STAT_EPISODE_RECENT_COUNT: 10
MDC_IDC_STAT_EPISODE_RECENT_COUNT: 612
MDC_IDC_STAT_EPISODE_RECENT_COUNT_DTM_END: NORMAL
MDC_IDC_STAT_EPISODE_RECENT_COUNT_DTM_START: NORMAL
MDC_IDC_STAT_EPISODE_TYPE: NORMAL
MDC_IDC_STAT_EPISODE_VENDOR_TYPE: NORMAL
MDC_IDC_STAT_TACHYTHERAPY_ATP_DELIVERED_RECENT: 0
MDC_IDC_STAT_TACHYTHERAPY_ATP_DELIVERED_TOTAL: 7
MDC_IDC_STAT_TACHYTHERAPY_RECENT_DTM_END: NORMAL
MDC_IDC_STAT_TACHYTHERAPY_RECENT_DTM_START: NORMAL
MDC_IDC_STAT_TACHYTHERAPY_SHOCKS_ABORTED_RECENT: 0
MDC_IDC_STAT_TACHYTHERAPY_SHOCKS_ABORTED_TOTAL: 1
MDC_IDC_STAT_TACHYTHERAPY_SHOCKS_DELIVERED_RECENT: 0
MDC_IDC_STAT_TACHYTHERAPY_SHOCKS_DELIVERED_TOTAL: 7
MDC_IDC_STAT_TACHYTHERAPY_TOTAL_DTM_END: NORMAL
MDC_IDC_STAT_TACHYTHERAPY_TOTAL_DTM_START: NORMAL

## 2020-05-27 ENCOUNTER — TELEPHONE (OUTPATIENT)
Dept: CARDIOLOGY | Facility: CLINIC | Age: 70
End: 2020-05-27

## 2020-05-27 DIAGNOSIS — Z95.810 ICD (IMPLANTABLE CARDIOVERTER-DEFIBRILLATOR) IN PLACE: Primary | ICD-10-CM

## 2020-05-27 DIAGNOSIS — I47.20 VENTRICULAR TACHYCARDIA (H): ICD-10-CM

## 2020-05-27 NOTE — TELEPHONE ENCOUNTER
Teri from [facility] left a VM. She's asking for information on our mutual patient.     I called Teri back at [phone]. She said pt had a leg surgery today and they used a magnet during the surgery. Teri said it is their protocol to do a courtesy remote check within 1 month after using a magnet. So she asked that we do the courtesy remote check since he follows for his device here. We do not need to send results to [facility].     This is not our protocol, but I asked our manager who said go ahead and do it, and keep pt on current schedule after that. I scheduled courtesy remote check for 6/11/2020 (so it should come in automatically). Did not call pt to let him know about this remote check because he is still in the hospital at [facility].

## 2020-06-11 ENCOUNTER — ANCILLARY PROCEDURE (OUTPATIENT)
Dept: CARDIOLOGY | Facility: CLINIC | Age: 70
End: 2020-06-11
Attending: INTERNAL MEDICINE
Payer: COMMERCIAL

## 2020-06-11 DIAGNOSIS — I47.20 VENTRICULAR TACHYCARDIA (H): ICD-10-CM

## 2020-06-11 DIAGNOSIS — Z95.810 ICD (IMPLANTABLE CARDIOVERTER-DEFIBRILLATOR) IN PLACE: ICD-10-CM

## 2020-06-24 LAB
MDC_IDC_EPISODE_DTM: NORMAL
MDC_IDC_EPISODE_DURATION: 1 S
MDC_IDC_EPISODE_DURATION: 104 S
MDC_IDC_EPISODE_DURATION: 2 S
MDC_IDC_EPISODE_DURATION: 2 S
MDC_IDC_EPISODE_DURATION: 22 S
MDC_IDC_EPISODE_DURATION: 240 S
MDC_IDC_EPISODE_DURATION: 245 S
MDC_IDC_EPISODE_DURATION: 3 S
MDC_IDC_EPISODE_DURATION: 3 S
MDC_IDC_EPISODE_DURATION: 30 S
MDC_IDC_EPISODE_DURATION: 33 S
MDC_IDC_EPISODE_DURATION: 33 S
MDC_IDC_EPISODE_DURATION: 34 S
MDC_IDC_EPISODE_DURATION: 4 S
MDC_IDC_EPISODE_DURATION: 46 S
MDC_IDC_EPISODE_DURATION: 53 S
MDC_IDC_EPISODE_DURATION: 55 S
MDC_IDC_EPISODE_DURATION: 6 S
MDC_IDC_EPISODE_DURATION: 7 S
MDC_IDC_EPISODE_DURATION: 9 S
MDC_IDC_EPISODE_ID: NORMAL
MDC_IDC_EPISODE_TYPE: NORMAL
MDC_IDC_EPISODE_VENDOR_TYPE: NORMAL
MDC_IDC_LEAD_IMPLANT_DT: NORMAL
MDC_IDC_LEAD_IMPLANT_DT: NORMAL
MDC_IDC_LEAD_LOCATION: NORMAL
MDC_IDC_LEAD_LOCATION: NORMAL
MDC_IDC_LEAD_LOCATION_DETAIL_1: NORMAL
MDC_IDC_LEAD_LOCATION_DETAIL_1: NORMAL
MDC_IDC_LEAD_MFG: NORMAL
MDC_IDC_LEAD_MFG: NORMAL
MDC_IDC_LEAD_MODEL: NORMAL
MDC_IDC_LEAD_MODEL: NORMAL
MDC_IDC_LEAD_POLARITY_TYPE: NORMAL
MDC_IDC_LEAD_POLARITY_TYPE: NORMAL
MDC_IDC_LEAD_SERIAL: NORMAL
MDC_IDC_LEAD_SERIAL: NORMAL
MDC_IDC_MSMT_BATTERY_DTM: NORMAL
MDC_IDC_MSMT_BATTERY_REMAINING_LONGEVITY: 5 MO
MDC_IDC_MSMT_BATTERY_REMAINING_PERCENTAGE: 5 %
MDC_IDC_MSMT_BATTERY_STATUS: NORMAL
MDC_IDC_MSMT_CAP_CHARGE_DTM: NORMAL
MDC_IDC_MSMT_CAP_CHARGE_DTM: NORMAL
MDC_IDC_MSMT_CAP_CHARGE_ENERGY: 41 J
MDC_IDC_MSMT_CAP_CHARGE_TIME: 12 S
MDC_IDC_MSMT_CAP_CHARGE_TIME: 7.5 S
MDC_IDC_MSMT_CAP_CHARGE_TYPE: NORMAL
MDC_IDC_MSMT_CAP_CHARGE_TYPE: NORMAL
MDC_IDC_MSMT_LEADCHNL_RA_IMPEDANCE_VALUE: 538 OHM
MDC_IDC_MSMT_LEADCHNL_RA_PACING_THRESHOLD_AMPLITUDE: 1 V
MDC_IDC_MSMT_LEADCHNL_RA_PACING_THRESHOLD_PULSEWIDTH: 0.5 MS
MDC_IDC_MSMT_LEADCHNL_RV_IMPEDANCE_VALUE: 362 OHM
MDC_IDC_MSMT_LEADCHNL_RV_PACING_THRESHOLD_AMPLITUDE: 1.6 V
MDC_IDC_MSMT_LEADCHNL_RV_PACING_THRESHOLD_PULSEWIDTH: 0.5 MS
MDC_IDC_PG_IMPLANT_DTM: NORMAL
MDC_IDC_PG_MFG: NORMAL
MDC_IDC_PG_MODEL: NORMAL
MDC_IDC_PG_SERIAL: 7643
MDC_IDC_PG_TYPE: NORMAL
MDC_IDC_SESS_CLINIC_NAME: NORMAL
MDC_IDC_SESS_DTM: NORMAL
MDC_IDC_SESS_TYPE: NORMAL
MDC_IDC_SET_BRADY_AT_MODE_SWITCH_MODE: NORMAL
MDC_IDC_SET_BRADY_AT_MODE_SWITCH_RATE: 170 {BEATS}/MIN
MDC_IDC_SET_BRADY_LOWRATE: 60 {BEATS}/MIN
MDC_IDC_SET_BRADY_MAX_SENSOR_RATE: 130 {BEATS}/MIN
MDC_IDC_SET_BRADY_MAX_TRACKING_RATE: 130 {BEATS}/MIN
MDC_IDC_SET_BRADY_MODE: NORMAL
MDC_IDC_SET_BRADY_PAV_DELAY_HIGH: 220 MS
MDC_IDC_SET_BRADY_PAV_DELAY_LOW: 400 MS
MDC_IDC_SET_BRADY_SAV_DELAY_HIGH: 220 MS
MDC_IDC_SET_BRADY_SAV_DELAY_LOW: 400 MS
MDC_IDC_SET_LEADCHNL_RA_PACING_AMPLITUDE: 2 V
MDC_IDC_SET_LEADCHNL_RA_PACING_POLARITY: NORMAL
MDC_IDC_SET_LEADCHNL_RA_PACING_PULSEWIDTH: 0.5 MS
MDC_IDC_SET_LEADCHNL_RA_SENSING_ADAPTATION_MODE: NORMAL
MDC_IDC_SET_LEADCHNL_RA_SENSING_POLARITY: NORMAL
MDC_IDC_SET_LEADCHNL_RA_SENSING_SENSITIVITY: 0.25 MV
MDC_IDC_SET_LEADCHNL_RV_PACING_AMPLITUDE: 2 V
MDC_IDC_SET_LEADCHNL_RV_PACING_POLARITY: NORMAL
MDC_IDC_SET_LEADCHNL_RV_PACING_PULSEWIDTH: 0.5 MS
MDC_IDC_SET_LEADCHNL_RV_SENSING_ADAPTATION_MODE: NORMAL
MDC_IDC_SET_LEADCHNL_RV_SENSING_POLARITY: NORMAL
MDC_IDC_SET_LEADCHNL_RV_SENSING_SENSITIVITY: 0.6 MV
MDC_IDC_SET_ZONE_DETECTION_INTERVAL: 300 MS
MDC_IDC_SET_ZONE_DETECTION_INTERVAL: 353 MS
MDC_IDC_SET_ZONE_DETECTION_INTERVAL: 400 MS
MDC_IDC_SET_ZONE_TYPE: NORMAL
MDC_IDC_SET_ZONE_VENDOR_TYPE: NORMAL
MDC_IDC_STAT_BRADY_DTM_END: NORMAL
MDC_IDC_STAT_BRADY_DTM_START: NORMAL
MDC_IDC_STAT_BRADY_RA_PERCENT_PACED: 7 %
MDC_IDC_STAT_BRADY_RV_PERCENT_PACED: 0 %
MDC_IDC_STAT_EPISODE_RECENT_COUNT: 0
MDC_IDC_STAT_EPISODE_RECENT_COUNT: 15
MDC_IDC_STAT_EPISODE_RECENT_COUNT: 665
MDC_IDC_STAT_EPISODE_RECENT_COUNT_DTM_END: NORMAL
MDC_IDC_STAT_EPISODE_RECENT_COUNT_DTM_START: NORMAL
MDC_IDC_STAT_EPISODE_TYPE: NORMAL
MDC_IDC_STAT_EPISODE_VENDOR_TYPE: NORMAL
MDC_IDC_STAT_TACHYTHERAPY_ATP_DELIVERED_RECENT: 0
MDC_IDC_STAT_TACHYTHERAPY_ATP_DELIVERED_TOTAL: 7
MDC_IDC_STAT_TACHYTHERAPY_RECENT_DTM_END: NORMAL
MDC_IDC_STAT_TACHYTHERAPY_RECENT_DTM_START: NORMAL
MDC_IDC_STAT_TACHYTHERAPY_SHOCKS_ABORTED_RECENT: 0
MDC_IDC_STAT_TACHYTHERAPY_SHOCKS_ABORTED_TOTAL: 1
MDC_IDC_STAT_TACHYTHERAPY_SHOCKS_DELIVERED_RECENT: 0
MDC_IDC_STAT_TACHYTHERAPY_SHOCKS_DELIVERED_TOTAL: 7
MDC_IDC_STAT_TACHYTHERAPY_TOTAL_DTM_END: NORMAL
MDC_IDC_STAT_TACHYTHERAPY_TOTAL_DTM_START: NORMAL

## 2020-08-26 ENCOUNTER — ANCILLARY PROCEDURE (OUTPATIENT)
Dept: CARDIOLOGY | Facility: CLINIC | Age: 70
End: 2020-08-26
Attending: INTERNAL MEDICINE
Payer: COMMERCIAL

## 2020-08-26 ENCOUNTER — TELEPHONE (OUTPATIENT)
Dept: CARDIOLOGY | Facility: CLINIC | Age: 70
End: 2020-08-26

## 2020-08-26 ENCOUNTER — NURSE TRIAGE (OUTPATIENT)
Dept: NURSING | Facility: CLINIC | Age: 70
End: 2020-08-26

## 2020-08-26 DIAGNOSIS — Z95.810 ICD (IMPLANTABLE CARDIOVERTER-DEFIBRILLATOR) IN PLACE: Primary | ICD-10-CM

## 2020-08-26 DIAGNOSIS — I25.5 ISCHEMIC CARDIOMYOPATHY: ICD-10-CM

## 2020-08-26 DIAGNOSIS — Z95.810 ICD (IMPLANTABLE CARDIOVERTER-DEFIBRILLATOR) IN PLACE: ICD-10-CM

## 2020-08-26 DIAGNOSIS — I47.20 VENTRICULAR TACHYCARDIA (H): ICD-10-CM

## 2020-08-26 DIAGNOSIS — Z45.02 ENCOUNTER FOR SERVICING OF IMPLANTABLE CARDIOVERTER-DEFIBRILLATOR (ICD) AT END OF BATTERY LIFE: ICD-10-CM

## 2020-08-26 PROCEDURE — 93295 DEV INTERROG REMOTE 1/2/MLT: CPT | Performed by: INTERNAL MEDICINE

## 2020-08-26 PROCEDURE — 93296 REM INTERROG EVL PM/IDS: CPT | Performed by: INTERNAL MEDICINE

## 2020-08-26 NOTE — TELEPHONE ENCOUNTER
Pt left VM, his ICD is beeping, he thinks it the battery. I asked [employer] to call pt and trouble shoot his monitor because it was not connecting.     Remote alert came in, ICD triggered MARK ANTHONY on 8/16/2020. Known noise on A lead. 3 episodes of NSVT and 12 episodes of SVT. Presenting rhythm is SR.  See full report under Cardiology tab.     Called pt and explained ICD is at MARK ANTHONY and explained the process for H&P and gen change. Pt asked if he will need a COVID test prior to the procedure, I told him yes. He said he will push the gen change out a couple months then because he's already had 4 COVID tests, and he's hoping the process will change. I told pt it likely won't change, but he can schedule it that way if he prefers. Told pt that scheduling will call him this afternoon to set up appointments. He agrees with this plan.

## 2020-08-26 NOTE — TELEPHONE ENCOUNTER
"\"I have a pacemaker and it started beeping.\" Patient stating he is unsure if it is connected to WiFi for monitoring.    Patient is denying any current symptoms.    Warm transferred to [facility] to speak with  Marley AREVALO in triage.     Caller verbalized understanding. Denies further questions.    Lia Arambula RN  [facility]      Reason for Disposition    Requesting regular office appointment    Additional Information    Negative: [1] Caller is not with the adult (patient) AND [2] reporting urgent symptoms    Negative: Lab result questions    Negative: Medication questions    Negative: Caller can't be reached by phone    Negative: Caller has already spoken to PCP or another triager    Negative: RN needs further essential information from caller in order to complete triage    Protocols used: INFORMATION ONLY CALL-A-AH      "

## 2020-08-28 LAB
MDC_IDC_EPISODE_DTM: NORMAL
MDC_IDC_EPISODE_DURATION: 1 S
MDC_IDC_EPISODE_DURATION: 1 S
MDC_IDC_EPISODE_DURATION: 10 S
MDC_IDC_EPISODE_DURATION: 2 S
MDC_IDC_EPISODE_DURATION: 3 S
MDC_IDC_EPISODE_DURATION: 3 S
MDC_IDC_EPISODE_DURATION: 4 S
MDC_IDC_EPISODE_DURATION: 4 S
MDC_IDC_EPISODE_DURATION: 7 S
MDC_IDC_EPISODE_DURATION: 7 S
MDC_IDC_EPISODE_DURATION: 8 S
MDC_IDC_EPISODE_DURATION: 9 S
MDC_IDC_EPISODE_ID: NORMAL
MDC_IDC_EPISODE_TYPE: NORMAL
MDC_IDC_LEAD_IMPLANT_DT: NORMAL
MDC_IDC_LEAD_IMPLANT_DT: NORMAL
MDC_IDC_LEAD_LOCATION: NORMAL
MDC_IDC_LEAD_LOCATION: NORMAL
MDC_IDC_LEAD_LOCATION_DETAIL_1: NORMAL
MDC_IDC_LEAD_LOCATION_DETAIL_1: NORMAL
MDC_IDC_LEAD_MFG: NORMAL
MDC_IDC_LEAD_MFG: NORMAL
MDC_IDC_LEAD_MODEL: NORMAL
MDC_IDC_LEAD_MODEL: NORMAL
MDC_IDC_LEAD_POLARITY_TYPE: NORMAL
MDC_IDC_LEAD_POLARITY_TYPE: NORMAL
MDC_IDC_LEAD_SERIAL: NORMAL
MDC_IDC_LEAD_SERIAL: NORMAL
MDC_IDC_MSMT_BATTERY_DTM: NORMAL
MDC_IDC_MSMT_BATTERY_REMAINING_LONGEVITY: NORMAL
MDC_IDC_MSMT_BATTERY_REMAINING_PERCENTAGE: 1 %
MDC_IDC_MSMT_BATTERY_STATUS: NORMAL
MDC_IDC_MSMT_CAP_CHARGE_DTM: NORMAL
MDC_IDC_MSMT_CAP_CHARGE_DTM: NORMAL
MDC_IDC_MSMT_CAP_CHARGE_ENERGY: 41 J
MDC_IDC_MSMT_CAP_CHARGE_TIME: 12.3 S
MDC_IDC_MSMT_CAP_CHARGE_TIME: 7.5 S
MDC_IDC_MSMT_CAP_CHARGE_TYPE: NORMAL
MDC_IDC_MSMT_CAP_CHARGE_TYPE: NORMAL
MDC_IDC_MSMT_LEADCHNL_RA_IMPEDANCE_VALUE: 555 OHM
MDC_IDC_MSMT_LEADCHNL_RA_PACING_THRESHOLD_AMPLITUDE: 1 V
MDC_IDC_MSMT_LEADCHNL_RA_PACING_THRESHOLD_PULSEWIDTH: 0.5 MS
MDC_IDC_MSMT_LEADCHNL_RV_IMPEDANCE_VALUE: 361 OHM
MDC_IDC_MSMT_LEADCHNL_RV_PACING_THRESHOLD_AMPLITUDE: 1.5 V
MDC_IDC_MSMT_LEADCHNL_RV_PACING_THRESHOLD_PULSEWIDTH: 0.5 MS
MDC_IDC_PG_IMPLANT_DTM: NORMAL
MDC_IDC_PG_MFG: NORMAL
MDC_IDC_PG_MODEL: NORMAL
MDC_IDC_PG_SERIAL: 7643
MDC_IDC_PG_TYPE: NORMAL
MDC_IDC_SESS_CLINIC_NAME: NORMAL
MDC_IDC_SESS_DTM: NORMAL
MDC_IDC_SESS_TYPE: NORMAL
MDC_IDC_SET_BRADY_AT_MODE_SWITCH_MODE: NORMAL
MDC_IDC_SET_BRADY_AT_MODE_SWITCH_RATE: 170 {BEATS}/MIN
MDC_IDC_SET_BRADY_LOWRATE: 60 {BEATS}/MIN
MDC_IDC_SET_BRADY_MAX_SENSOR_RATE: 130 {BEATS}/MIN
MDC_IDC_SET_BRADY_MAX_TRACKING_RATE: 130 {BEATS}/MIN
MDC_IDC_SET_BRADY_MODE: NORMAL
MDC_IDC_SET_BRADY_PAV_DELAY_HIGH: 220 MS
MDC_IDC_SET_BRADY_PAV_DELAY_LOW: 400 MS
MDC_IDC_SET_BRADY_SAV_DELAY_HIGH: 220 MS
MDC_IDC_SET_BRADY_SAV_DELAY_LOW: 400 MS
MDC_IDC_SET_LEADCHNL_RA_PACING_AMPLITUDE: 2 V
MDC_IDC_SET_LEADCHNL_RA_PACING_POLARITY: NORMAL
MDC_IDC_SET_LEADCHNL_RA_PACING_PULSEWIDTH: 0.5 MS
MDC_IDC_SET_LEADCHNL_RA_SENSING_ADAPTATION_MODE: NORMAL
MDC_IDC_SET_LEADCHNL_RA_SENSING_POLARITY: NORMAL
MDC_IDC_SET_LEADCHNL_RA_SENSING_SENSITIVITY: 0.25 MV
MDC_IDC_SET_LEADCHNL_RV_PACING_AMPLITUDE: 2 V
MDC_IDC_SET_LEADCHNL_RV_PACING_POLARITY: NORMAL
MDC_IDC_SET_LEADCHNL_RV_PACING_PULSEWIDTH: 0.5 MS
MDC_IDC_SET_LEADCHNL_RV_SENSING_ADAPTATION_MODE: NORMAL
MDC_IDC_SET_LEADCHNL_RV_SENSING_POLARITY: NORMAL
MDC_IDC_SET_LEADCHNL_RV_SENSING_SENSITIVITY: 0.6 MV
MDC_IDC_SET_ZONE_DETECTION_INTERVAL: 300 MS
MDC_IDC_SET_ZONE_DETECTION_INTERVAL: 353 MS
MDC_IDC_SET_ZONE_DETECTION_INTERVAL: 400 MS
MDC_IDC_SET_ZONE_TYPE: NORMAL
MDC_IDC_SET_ZONE_VENDOR_TYPE: NORMAL
MDC_IDC_STAT_BRADY_DTM_END: NORMAL
MDC_IDC_STAT_BRADY_DTM_START: NORMAL
MDC_IDC_STAT_BRADY_RA_PERCENT_PACED: 6 %
MDC_IDC_STAT_BRADY_RV_PERCENT_PACED: 0 %
MDC_IDC_STAT_EPISODE_RECENT_COUNT: 0
MDC_IDC_STAT_EPISODE_RECENT_COUNT: 19
MDC_IDC_STAT_EPISODE_RECENT_COUNT: 787
MDC_IDC_STAT_EPISODE_RECENT_COUNT_DTM_END: NORMAL
MDC_IDC_STAT_EPISODE_RECENT_COUNT_DTM_START: NORMAL
MDC_IDC_STAT_EPISODE_TYPE: NORMAL
MDC_IDC_STAT_EPISODE_VENDOR_TYPE: NORMAL
MDC_IDC_STAT_TACHYTHERAPY_ATP_DELIVERED_RECENT: 0
MDC_IDC_STAT_TACHYTHERAPY_ATP_DELIVERED_TOTAL: 7
MDC_IDC_STAT_TACHYTHERAPY_RECENT_DTM_END: NORMAL
MDC_IDC_STAT_TACHYTHERAPY_RECENT_DTM_START: NORMAL
MDC_IDC_STAT_TACHYTHERAPY_SHOCKS_ABORTED_RECENT: 0
MDC_IDC_STAT_TACHYTHERAPY_SHOCKS_ABORTED_TOTAL: 1
MDC_IDC_STAT_TACHYTHERAPY_SHOCKS_DELIVERED_RECENT: 0
MDC_IDC_STAT_TACHYTHERAPY_SHOCKS_DELIVERED_TOTAL: 7
MDC_IDC_STAT_TACHYTHERAPY_TOTAL_DTM_END: NORMAL
MDC_IDC_STAT_TACHYTHERAPY_TOTAL_DTM_START: NORMAL

## 2020-09-01 DIAGNOSIS — Z11.59 ENCOUNTER FOR SCREENING FOR OTHER VIRAL DISEASES: Primary | ICD-10-CM

## 2020-11-11 ENCOUNTER — TELEPHONE (OUTPATIENT)
Dept: CARDIOLOGY | Facility: CLINIC | Age: 70
End: 2020-11-11

## 2020-11-11 NOTE — TELEPHONE ENCOUNTER
Pt calling and asking if the COVID 19 test he had on Tuesday 11/10/20 is good for his procedure on 11/16/20. Informed him that the test has to done within 72 hours of procedure. So he needs to have another one.

## 2020-11-12 DIAGNOSIS — Z11.59 ENCOUNTER FOR SCREENING FOR OTHER VIRAL DISEASES: Primary | ICD-10-CM

## 2020-11-12 DIAGNOSIS — Z95.810 ICD (IMPLANTABLE CARDIOVERTER-DEFIBRILLATOR) IN PLACE: Primary | ICD-10-CM

## 2020-11-12 RX ORDER — SODIUM CHLORIDE 450 MG/100ML
INJECTION, SOLUTION INTRAVENOUS CONTINUOUS
Status: CANCELLED | OUTPATIENT
Start: 2020-11-12

## 2020-11-12 RX ORDER — LIDOCAINE 40 MG/G
CREAM TOPICAL
Status: CANCELLED | OUTPATIENT
Start: 2020-11-12

## 2020-11-12 RX ORDER — CEFAZOLIN SODIUM 2 G/100ML
2 INJECTION, SOLUTION INTRAVENOUS
Status: CANCELLED | OUTPATIENT
Start: 2020-11-12

## 2020-11-12 NOTE — PROGRESS NOTES
Patient called device clinic back and pre-procedural instructions were reviewed. Patient stated that he is currently has a covid test scheduled with [facility] on Friday, 11/13/2020 at 4:30pm, but he does not to keep procedure date on Monday if he results might not be back in time. Informed patient that no one can for sure gaurantee that his covid results will be back in time. Patient stated that he tried calling the covid scheduling nurse line but was on hold and patient stated he was not going to wait and would not call them again to sit back on hold. Informed patient that his frustration was understood however unfortunately at this time with the demand for covid testing that process will not go any faster.    Patient inquired about moving his procedure date out to a later week so everything could be rescheduled. Informed patient we could look to see what later lab dates are available but given that patient's device triggered MARK ANTHONY on 08/16/2020 we would not recommend nor can guarantee that patient's device battery can continue functioning properly. Patient stated he understood and was willing to accept that risk.     Checked with scheduling and there was an opening on Tuesday, 11/17/2020, at 1300 with Dr. Leonard. Reviewed that date with Dr. Leonard who stated that would be fine as well as with patient who agreed to new procedure date.     At this time patient is scheduled for covid test at [facility] on 11/13/2020 at 4:30pm and to have ICD generator change done on 11/17/2020 at 1:00pm. Reviewed this information with patient who confirmed those plans.

## 2020-11-12 NOTE — PROGRESS NOTES
Called patient with pre-procedure instructions for device implant and left voicemail asking patient to call device clinic back to review below:    Anticoagulation: NA   Oral diabetes meds: Hold Metformin the AM of procedure.  Insulin: Ask your PCP regarding Lantus and Novolog dosing.  Diuretic: Hold Aldactone the AM of procedure  Contrast allergy: No known   Pt informed to be NPO at midnight  (if procedure scheduled 1pm or later, may have clear liquid breakfast before 8am)    Instructed pt to shower the morning of the procedure, and then put on a clean shirt in order to help prevent infection.     Pt has post-procedure transportation and 24 hours monitoring set up.   Pt aware of no driving for 24 hours post procedure due to sedation.     COVID test scheduled: Appears covid tested scheduled for 11/12/2020 was cancelled for unknown reason. Will make patient aware that he needs to get scheduled by tomorrow for covid test.     Pt reminded to self-quarantine from the time of the COVID test to the procedure.    Pt aware of arrival time at 11:00 on 11/16/2020 and location. Pt verbalized understanding of instructions.

## 2020-11-12 NOTE — PROGRESS NOTES
Patient called device clinic back shortly after the below conversation and stated he did call the covid scheduling RN number back and was able to get scheduled for covid testing with our facility site on Saturday 11/14/2020. Patient was inquiring if this new testing date would work for Monday procedure. Informed patient that unfortunately that date would not work for a procedure on Monday, 11/16/2020, but that it would still work well for now scheduled procedure on Tuesday, 11/17/2020. Patient stated he understood and plan for covid test on 11/14/2020 and procedure on 11/17/2020 was confirmed one final time.

## 2020-11-13 ENCOUNTER — OFFICE VISIT (OUTPATIENT)
Dept: CARDIOLOGY | Facility: CLINIC | Age: 70
End: 2020-11-13
Payer: COMMERCIAL

## 2020-11-13 VITALS
WEIGHT: 215 LBS | BODY MASS INDEX: 32.22 KG/M2 | SYSTOLIC BLOOD PRESSURE: 143 MMHG | DIASTOLIC BLOOD PRESSURE: 76 MMHG | HEART RATE: 73 BPM

## 2020-11-13 DIAGNOSIS — Z45.02 ENCOUNTER FOR SERVICING OF IMPLANTABLE CARDIOVERTER-DEFIBRILLATOR (ICD) AT END OF BATTERY LIFE: ICD-10-CM

## 2020-11-13 DIAGNOSIS — I25.5 ISCHEMIC CARDIOMYOPATHY: ICD-10-CM

## 2020-11-13 DIAGNOSIS — Z95.810 ICD (IMPLANTABLE CARDIOVERTER-DEFIBRILLATOR) IN PLACE: ICD-10-CM

## 2020-11-13 PROCEDURE — 99204 OFFICE O/P NEW MOD 45 MIN: CPT | Performed by: INTERNAL MEDICINE

## 2020-11-13 RX ORDER — CLOPIDOGREL BISULFATE 75 MG/1
75 TABLET ORAL DAILY
COMMUNITY
End: 2020-11-13

## 2020-11-13 RX ORDER — PREGABALIN 75 MG/1
75 CAPSULE ORAL 2 TIMES DAILY
COMMUNITY
End: 2022-07-01

## 2020-11-13 NOTE — LETTER
11/13/2020      Edwina Rodriguez MD  [facility] [address] [place] [address]      RE: Go SLOAN Quentin       Dear Colleague,    I had the pleasure of seeing Go Saavedra in the [facility].    Service Date: 11/13/2020      HISTORY OF PRESENT ILLNESS:  I saw Mr. Saavedra for history and physical examination before ICD pulse generator replacement.  He is a 70-year-old white male with a history of coronary artery disease and myocardial infarct.  He received a single-chamber [employer] ICD in 2008 for sustained ventricular tachycardia.  He had 1 ICD shock for ventricular tachyarrhythmia in that year.  Over the years, he has been doing well without recurrent sustained VT in the absence of antiarrhythmic drugs.  He currently has no shortness of breath, chest pain or palpitation.  The ICD pulse generator has reached [facility], and he has been scheduled for pulse generator replacement next Tuesday.      He previously had severe LV dysfunction, and the last echocardiography in 2018 reported EF of 50%.      His last coronary angiography was 2017, and he has not had any recent stenting or angiography.      His ICD atrial lead is known to be noisy, suggesting partial fracture.  He does not require ventricular pacing.  He has no history of atrial fibrillation.      PHYSICAL EXAMINATION:  On examination blood pressure was 143/76, heart rate 73 beats per minute, body weight 215 pounds.  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  He came to the clinic in a wheelchair.  Cardiovascular examination showed no remarkable abnormalities.      ASSESSMENT AND RECOMMENDATIONS:  Mr. Saavedra is a 70-year-old white male with a history of coronary artery disease and previous myocardial infarct.  He received ICD for sustained VT and has been doing well since.  I agree for ICD pulse generator replacement.  Due to the fact that the patient has not had  recurrent sustained VT and he had no atrial fibrillation and does not require ventricular pacing, I have recommended replacement of the generator to a single-chamber by abandoning the atrial lead.  The risks and benefits of the ICD replacement procedure were explained to the patient, who expressed understanding and consented for the procedure.      The patient is asked to stop Plavix but continue aspirin.      cc:   Edwina Rodriguez MD   [facility]   [address] [place]  [address]         AVNI STARK MD             D: 2020   T: 2020   MT: ARABELLA      Name:     AUDREY MENDEZ   MRN:      [id number]        Account:      [id number]   :      1950           Service Date: 2020      Document: [id number]        Outpatient Encounter Medications as of 2020   Medication Sig Dispense Refill     Aspirin (ASPIR-81 PO) Take 81 mg by mouth daily        atorvastatin (LIPITOR) 80 MG tablet Take 80 mg by mouth daily       insulin aspart (NOVOLOG FLEXPEN) 100 UNIT/ML injection Inject 8 Units Subcutaneous 3 times daily (with meals)        insulin glargine (BASAGLAR KWIKPEN) 100 UNIT/ML pen Inject 12 Units Subcutaneous At Bedtime        insulin pen needle (NOVOFINE) 30G X 8 MM Use 5 pen needles daily or as directed.       lisinopril (PRINIVIL/ZESTRIL) 40 MG tablet Take 40 mg by mouth daily       metFORMIN (GLUCOPHAGE-XR) 500 MG 24 hr tablet Take 500 mg by mouth daily (with dinner)       metoprolol (TOPROL-XL) 100 MG 24 hr tablet Take 200 mg by mouth daily       pregabalin (LYRICA) 75 MG capsule Take 75 mg by mouth 3 times daily       spironolactone (ALDACTONE) 25 MG tablet Take 75 mg by mouth daily        [DISCONTINUED] Gabapentin (NEURONTIN PO) Take 600 mg by mouth every morning       [DISCONTINUED] Gabapentin (NEURONTIN PO) Take 900 mg by mouth daily        [DISCONTINUED] insulin glargine (LANTUS SOLOSTAR) 100 UNIT/ML pen Inject 30 Units Subcutaneous every morning        nitroGLYcerin (NITROSTAT) 0.4 MG sublingual tablet Place 0.4 mg under the tongue every 5 minutes as needed for chest pain For chest pain place 1 tablet under the tongue every 5 minutes for 3 doses. If symptoms persist 5 minutes after 1st dose call 911.       [DISCONTINUED] clopidogrel (PLAVIX) 75 MG tablet Take 75 mg by mouth daily       [DISCONTINUED] ondansetron (ZOFRAN ODT) 4 MG ODT tab Take 1-2 tablets (4-8 mg) by mouth every 8 hours as needed for nausea (Patient not taking: Reported on 11/13/2020) 15 tablet 0     No facility-administered encounter medications on file as of 11/13/2020.        Again, thank you for allowing me to participate in the care of your patient.      Sincerely,    Coty Leonard MD     [facility]

## 2020-11-13 NOTE — PROGRESS NOTES
Service Date: 11/13/2020      HISTORY OF PRESENT ILLNESS:  I saw Mr. Saavedra for history and physical examination before ICD pulse generator replacement.  He is a 70-year-old white male with a history of coronary artery disease and myocardial infarct.  He received a single-chamber [employer] ICD in 2008 for sustained ventricular tachycardia.  He had 1 ICD shock for ventricular tachyarrhythmia in that year.  Over the years, he has been doing well without recurrent sustained VT in the absence of antiarrhythmic drugs.  He currently has no shortness of breath, chest pain or palpitation.  The ICD pulse generator has reached [facility], and he has been scheduled for pulse generator replacement next Tuesday.      He previously had severe LV dysfunction, and the last echocardiography in 2018 reported EF of 50%.      His last coronary angiography was 2017, and he has not had any recent stenting or angiography.      His ICD atrial lead is known to be noisy, suggesting partial fracture.  He does not require ventricular pacing.  He has no history of atrial fibrillation.      PHYSICAL EXAMINATION:  On examination blood pressure was 143/76, heart rate 73 beats per minute, body weight 215 pounds.  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  He came to the clinic in a wheelchair.  Cardiovascular examination showed no remarkable abnormalities.      ASSESSMENT AND RECOMMENDATIONS:  Mr. Saavedra is a 70-year-old white male with a history of coronary artery disease and previous myocardial infarct.  He received ICD for sustained VT and has been doing well since.  I agree for ICD pulse generator replacement.  Due to the fact that the patient has not had recurrent sustained VT and he had no atrial fibrillation and does not require ventricular pacing, I have recommended replacement of the generator to a single-chamber by abandoning the atrial lead.  The risks and benefits of the ICD replacement procedure were  explained to the patient, who expressed understanding and consented for the procedure.      The patient is asked to stop Plavix but continue aspirin.      cc:   Edwina Rodriguez MD   [facility] [address] [place]  [address]         AVNI STARK MD             D: 2020   T: 2020   MT: ARABELLA      Name:     AUDREY MENDEZ   MRN:      [id number]        Account:      [id number]   :      1950           Service Date: 2020      Document: [id number]

## 2020-11-13 NOTE — PROGRESS NOTES
HPI and Plan:   See dictation    No orders of the defined types were placed in this encounter.      Orders Placed This Encounter   Medications     pregabalin (LYRICA) 75 MG capsule     Sig: Take 75 mg by mouth 3 times daily     DISCONTD: clopidogrel (PLAVIX) 75 MG tablet     Sig: Take 75 mg by mouth daily       Medications Discontinued During This Encounter   Medication Reason     ondansetron (ZOFRAN ODT) 4 MG ODT tab      clopidogrel (PLAVIX) 75 MG tablet          Encounter Diagnoses   Name Primary?     ICD (implantable cardioverter-defibrillator) in place      Encounter for servicing of implantable cardioverter-defibrillator (ICD) at end of battery life      Ischemic cardiomyopathy        CURRENT MEDICATIONS:  Current Outpatient Medications   Medication Sig Dispense Refill     Aspirin (ASPIR-81 PO) Take 81 mg by mouth daily        atorvastatin (LIPITOR) 80 MG tablet Take 80 mg by mouth daily       Gabapentin (NEURONTIN PO) Take 600 mg by mouth every morning       Gabapentin (NEURONTIN PO) Take 900 mg by mouth daily        insulin aspart (NOVOLOG FLEXPEN) 100 UNIT/ML injection Inject 26 Units Subcutaneous 2 times daily (with meals)        insulin glargine (BASAGLAR KWIKPEN) 100 UNIT/ML pen Inject Subcutaneous daily       insulin glargine (LANTUS SOLOSTAR) 100 UNIT/ML pen Inject 30 Units Subcutaneous every morning       insulin pen needle (NOVOFINE) 30G X 8 MM Use 5 pen needles daily or as directed.       lisinopril (PRINIVIL/ZESTRIL) 40 MG tablet Take 40 mg by mouth daily       metFORMIN (GLUCOPHAGE-XR) 500 MG 24 hr tablet Take 500 mg by mouth daily (with dinner)       metoprolol (TOPROL-XL) 100 MG 24 hr tablet Take 200 mg by mouth daily       pregabalin (LYRICA) 75 MG capsule Take 75 mg by mouth 3 times daily       spironolactone (ALDACTONE) 25 MG tablet Take 75 mg by mouth daily        nitroGLYcerin (NITROSTAT) 0.4 MG sublingual tablet Place 0.4 mg under the tongue every 5 minutes as needed for chest pain For  chest pain place 1 tablet under the tongue every 5 minutes for 3 doses. If symptoms persist 5 minutes after 1st dose call 911.         ALLERGIES   No Known Allergies    PAST MEDICAL HISTORY:  Past Medical History:   Diagnosis Date     Coronary artery disease      Diabetes mellitus (H)      History of coronary artery bypass graft x 3 2008     Hyperlipidemia      Ischemic cardiomyopathy 2008     MI (myocardial infarction) (H) 11/2008    inferior     PAD (peripheral artery disease) (H) 12/31/2019    S/p angioplasty of right posterior tibial artery on 2/19/2019; s/p angioplasty of distal posterior tibial artery to right foot 4/4/19       Ventricular tachycardia (H)        PAST SURGICAL HISTORY:  Past Surgical History:   Procedure Laterality Date     BYPASS GRAFT ARTERY CORONARY  11/2008    3 vessel w/ LIMA to LAD, SVG to Diagl, SVG to PDA     ENDOSCOPIC RETROGRADE CHOLANGIOPANCREATOGRAM N/A 10/6/2018    Procedure: ENDOSCOPIC RETROGRADE CHOLANGIOPANCREATOGRAM;  ENDOSCOPIC ULTRASOUND, ENDOSCOPIC RETROGRADE CHOLANGIOPANCREATOGRAM (MAC);  Surgeon: Oc Yang MD;  Location:  OR     ESOPHAGOSCOPY, GASTROSCOPY, DUODENOSCOPY (EGD), COMBINED N/A 10/6/2018    Procedure: COMBINED ENDOSCOPIC ULTRASOUND, ESOPHAGOSCOPY, GASTROSCOPY, DUODENOSCOPY (EGD);;  Surgeon: Oc Yang MD;  Location:  OR      LEFT HEART CATHETERIZATION  10/2008    Emergent thrombectomy and stent to distal RCA     IMPLANT IMPLANTABLE CARDIOVERTER DEFIBRILLATOR  11/2008    dual chamber ICD     LAPAROSCOPIC CHOLECYSTECTOMY N/A 10/7/2018    Procedure: LAPAROSCOPIC CHOLECYSTECTOMY;   Laparoscopic Cholecystectomy;  Surgeon: Dhiraj Matute MD;  Location:  OR       FAMILY HISTORY:  Family History   Problem Relation Age of Onset     Cancer Brother      Diabetes No family hx of      Hypertension No family hx of        SOCIAL HISTORY:  Social History     Socioeconomic History     Marital status:      Spouse name: None      Number of children: None     Years of education: None     Highest education level: None   Occupational History     None   Social Needs     Financial resource strain: None     Food insecurity     Worry: None     Inability: None     Transportation needs     Medical: None     Non-medical: None   Tobacco Use     Smoking status: Never Smoker     Smokeless tobacco: Never Used   Substance and Sexual Activity     Alcohol use: No     Drug use: None     Sexual activity: None   Lifestyle     Physical activity     Days per week: None     Minutes per session: None     Stress: None   Relationships     Social connections     Talks on phone: None     Gets together: None     Attends Hinduism service: None     Active member of club or organization: None     Attends meetings of clubs or organizations: None     Relationship status: None     Intimate partner violence     Fear of current or ex partner: None     Emotionally abused: None     Physically abused: None     Forced sexual activity: None   Other Topics Concern     Parent/sibling w/ CABG, MI or angioplasty before 65F 55M? Not Asked   Social History Narrative     None       Review of Systems:  Skin:  Negative       Eyes:  Positive for glasses    ENT:  Negative      Respiratory:  Negative       Cardiovascular:  Negative      Gastroenterology: Negative      Genitourinary:  Negative      Musculoskeletal:  Positive for foot pain(rt foot, had some toes removed )    Neurologic:  Positive for numbness or tingling of feet(neuropathy)    Psychiatric:  Negative      Heme/Lymph/Imm:  Negative      Endocrine:  Positive for diabetes      Physical Exam:  Vitals: BP (!) 143/76   Pulse 73   Wt 97.5 kg (215 lb)   BMI 32.22 kg/m      Constitutional:           Skin:             Head:           Eyes:           Lymph:      ENT:           Neck:           Respiratory:            Cardiac:                                                           GI:           Extremities and Muscular Skeletal:                  Neurological:           Psych:           CC  No referring provider defined for this encounter.

## 2020-11-14 DIAGNOSIS — Z11.59 ENCOUNTER FOR SCREENING FOR OTHER VIRAL DISEASES: ICD-10-CM

## 2020-11-14 PROCEDURE — U0003 INFECTIOUS AGENT DETECTION BY NUCLEIC ACID (DNA OR RNA); SEVERE ACUTE RESPIRATORY SYNDROME CORONAVIRUS 2 (SARS-COV-2) (CORONAVIRUS DISEASE [COVID-19]), AMPLIFIED PROBE TECHNIQUE, MAKING USE OF HIGH THROUGHPUT TECHNOLOGIES AS DESCRIBED BY CMS-2020-01-R: HCPCS | Performed by: FAMILY MEDICINE

## 2020-11-15 LAB
LABORATORY COMMENT REPORT: NORMAL
SARS-COV-2 RNA SPEC QL NAA+PROBE: NEGATIVE
SARS-COV-2 RNA SPEC QL NAA+PROBE: NORMAL
SPECIMEN SOURCE: NORMAL
SPECIMEN SOURCE: NORMAL

## 2020-11-16 ENCOUNTER — TELEPHONE (OUTPATIENT)
Dept: CARDIOLOGY | Facility: CLINIC | Age: 70
End: 2020-11-16

## 2020-11-17 ENCOUNTER — HOSPITAL ENCOUNTER (OUTPATIENT)
Facility: CLINIC | Age: 70
Discharge: HOME | End: 2020-11-17
Admitting: INTERNAL MEDICINE
Payer: COMMERCIAL

## 2020-11-17 VITALS
DIASTOLIC BLOOD PRESSURE: 78 MMHG | BODY MASS INDEX: 32.58 KG/M2 | HEIGHT: 68 IN | OXYGEN SATURATION: 97 % | TEMPERATURE: 98.6 F | HEART RATE: 72 BPM | WEIGHT: 215 LBS | SYSTOLIC BLOOD PRESSURE: 147 MMHG | RESPIRATION RATE: 16 BRPM

## 2020-11-17 DIAGNOSIS — Z45.02 ENCOUNTER FOR SERVICING OF IMPLANTABLE CARDIOVERTER-DEFIBRILLATOR (ICD) AT END OF BATTERY LIFE: ICD-10-CM

## 2020-11-17 DIAGNOSIS — Z95.810 ICD (IMPLANTABLE CARDIOVERTER-DEFIBRILLATOR) IN PLACE: ICD-10-CM

## 2020-11-17 DIAGNOSIS — I25.5 ISCHEMIC CARDIOMYOPATHY: ICD-10-CM

## 2020-11-17 LAB
ANION GAP SERPL CALCULATED.3IONS-SCNC: 8 MMOL/L (ref 3–14)
BUN SERPL-MCNC: 18 MG/DL (ref 7–30)
CALCIUM SERPL-MCNC: 9.6 MG/DL (ref 8.5–10.1)
CHLORIDE SERPL-SCNC: 109 MMOL/L (ref 94–109)
CO2 SERPL-SCNC: 23 MMOL/L (ref 20–32)
CREAT SERPL-MCNC: 0.68 MG/DL (ref 0.66–1.25)
ERYTHROCYTE [DISTWIDTH] IN BLOOD BY AUTOMATED COUNT: 13.6 % (ref 10–15)
GFR SERPL CREATININE-BSD FRML MDRD: >90 ML/MIN/{1.73_M2}
GLUCOSE SERPL-MCNC: 199 MG/DL (ref 70–99)
HCT VFR BLD AUTO: 42.5 % (ref 40–53)
HGB BLD-MCNC: 14.3 G/DL (ref 13.3–17.7)
MCH RBC QN AUTO: 30.1 PG (ref 26.5–33)
MCHC RBC AUTO-ENTMCNC: 33.6 G/DL (ref 31.5–36.5)
MCV RBC AUTO: 90 FL (ref 78–100)
PLATELET # BLD AUTO: 240 10E9/L (ref 150–450)
POTASSIUM SERPL-SCNC: 4.1 MMOL/L (ref 3.4–5.3)
RBC # BLD AUTO: 4.75 10E12/L (ref 4.4–5.9)
SODIUM SERPL-SCNC: 140 MMOL/L (ref 133–144)
WBC # BLD AUTO: 6.1 10E9/L (ref 4–11)

## 2020-11-17 PROCEDURE — 250N000009 HC RX 250: Performed by: INTERNAL MEDICINE

## 2020-11-17 PROCEDURE — 85027 COMPLETE CBC AUTOMATED: CPT | Performed by: INTERNAL MEDICINE

## 2020-11-17 PROCEDURE — 33263 RMVL & RPLCMT DFB GEN 2 LEAD: CPT | Performed by: INTERNAL MEDICINE

## 2020-11-17 PROCEDURE — 250N000011 HC RX IP 250 OP 636: Performed by: INTERNAL MEDICINE

## 2020-11-17 PROCEDURE — 258N000002 HC RX IP 258 OP 250: Performed by: INTERNAL MEDICINE

## 2020-11-17 PROCEDURE — 99152 MOD SED SAME PHYS/QHP 5/>YRS: CPT | Mod: 59 | Performed by: INTERNAL MEDICINE

## 2020-11-17 PROCEDURE — 36415 COLL VENOUS BLD VENIPUNCTURE: CPT

## 2020-11-17 PROCEDURE — 80048 BASIC METABOLIC PNL TOTAL CA: CPT | Performed by: INTERNAL MEDICINE

## 2020-11-17 PROCEDURE — 999N000071 HC STATISTIC HEART CATH LAB OR EP LAB

## 2020-11-17 PROCEDURE — 33262 RMVL& REPLC PULSE GEN 1 LEAD: CPT | Performed by: INTERNAL MEDICINE

## 2020-11-17 PROCEDURE — 99153 MOD SED SAME PHYS/QHP EA: CPT | Performed by: INTERNAL MEDICINE

## 2020-11-17 PROCEDURE — 99152 MOD SED SAME PHYS/QHP 5/>YRS: CPT | Performed by: INTERNAL MEDICINE

## 2020-11-17 PROCEDURE — C1722 AICD, SINGLE CHAMBER: HCPCS | Performed by: INTERNAL MEDICINE

## 2020-11-17 PROCEDURE — 272N000001 HC OR GENERAL SUPPLY STERILE: Performed by: INTERNAL MEDICINE

## 2020-11-17 DEVICE — ICD MOMENTUM EL VR D120: Type: IMPLANTABLE DEVICE | Status: FUNCTIONAL

## 2020-11-17 RX ORDER — BUPIVACAINE HYDROCHLORIDE 2.5 MG/ML
INJECTION, SOLUTION EPIDURAL; INFILTRATION; INTRACAUDAL
Status: DISCONTINUED | OUTPATIENT
Start: 2020-11-17 | End: 2020-11-17 | Stop reason: HOSPADM

## 2020-11-17 RX ORDER — FENTANYL CITRATE 50 UG/ML
INJECTION, SOLUTION INTRAMUSCULAR; INTRAVENOUS
Status: DISCONTINUED | OUTPATIENT
Start: 2020-11-17 | End: 2020-11-17 | Stop reason: HOSPADM

## 2020-11-17 RX ORDER — CEFAZOLIN SODIUM 2 G/100ML
2 INJECTION, SOLUTION INTRAVENOUS
Status: COMPLETED | OUTPATIENT
Start: 2020-11-17 | End: 2020-11-17

## 2020-11-17 RX ORDER — NALOXONE HYDROCHLORIDE 0.4 MG/ML
.1-.4 INJECTION, SOLUTION INTRAMUSCULAR; INTRAVENOUS; SUBCUTANEOUS
Status: DISCONTINUED | OUTPATIENT
Start: 2020-11-17 | End: 2020-11-17 | Stop reason: HOSPADM

## 2020-11-17 RX ORDER — FUROSEMIDE 20 MG
20 TABLET ORAL DAILY
COMMUNITY
End: 2022-05-18

## 2020-11-17 RX ORDER — SODIUM CHLORIDE 450 MG/100ML
INJECTION, SOLUTION INTRAVENOUS CONTINUOUS
Status: DISCONTINUED | OUTPATIENT
Start: 2020-11-17 | End: 2020-11-17 | Stop reason: HOSPADM

## 2020-11-17 RX ORDER — OXYCODONE AND ACETAMINOPHEN 5; 325 MG/1; MG/1
1 TABLET ORAL EVERY 4 HOURS PRN
Status: DISCONTINUED | OUTPATIENT
Start: 2020-11-17 | End: 2020-11-17 | Stop reason: HOSPADM

## 2020-11-17 RX ORDER — LIDOCAINE 40 MG/G
CREAM TOPICAL
Status: DISCONTINUED | OUTPATIENT
Start: 2020-11-17 | End: 2020-11-17 | Stop reason: HOSPADM

## 2020-11-17 RX ADMIN — SODIUM CHLORIDE: 4.5 INJECTION, SOLUTION INTRAVENOUS at 12:21

## 2020-11-17 RX ADMIN — CEFAZOLIN SODIUM 2 G: 2 INJECTION, SOLUTION INTRAVENOUS at 12:31

## 2020-11-17 ASSESSMENT — MIFFLIN-ST. JEOR: SCORE: 1719.73

## 2020-11-17 NOTE — PROGRESS NOTES
Care Suites Admission Nursing Note    Patient Information  Name: Go Saavedra  Age: 70 year old  Reason for admission: gen change  Care Suites arrival time: 1100      Patient Admission/Assessment   Pre-procedure assessment complete: Yes  If abnormal assessment/labs, provider notified: N/A  NPO: Yes  Medications held per instructions/orders: N/A  Consent: to obtain by Dr Leonard  If applicable, pregnancy test status: deferred  Patient oriented to room: Yes  Education/questions answered: Yes  Plan/other: Proceed with 1300 gen change    Discharge Planning  Discharge name/phone Frances [phone]  Overnight post sedation caregiver: Frances  Discharge location: home    Angella Mcgovern RN

## 2020-11-17 NOTE — DISCHARGE SUMMARY
Care Suites Discharge Nursing Note    Patient Information  Name: Go Saavedra  Age: 70 year old    Discharge Education:  Discharge instructions reviewed: Yes  Additional education/resources provided:patient received instruction and monitoring device from pacer rep  Patient/patient representative verbalizes understanding: Yes  Patient discharging on new medications: no  Medication education completed: N/A    Discharge Plans:   Discharge location: home  Discharge ride contacted: Yes  Approximate discharge time: 1500    Discharge Criteria:  Discharge criteria met and vital signs stable: Yes    Patient Belongs:  Patient belongings returned to patient: Yes    Adam Ramsey RN

## 2020-11-17 NOTE — PRE-PROCEDURE
GENERAL PRE-PROCEDURE:   Procedure:  ICD replacement  Date/Time:  11/17/2020 1:23 PM    Verbal consent obtained?: Yes    Written consent obtained?: Yes    Emergent situation    Risks and benefits: Risks, benefits and alternatives were discussed    Consent given by:  Patient  Patient states understanding of procedure being performed: Yes    Patient's understanding of procedure matches consent: Yes    Procedure consent matches procedure scheduled: Yes    Expected level of sedation:  Moderate  Appropriately NPO:  Yes  ASA Class:  Class 3- Severe systemic disease, definite functional limitations  Mallampati  :  Grade 1- soft palate, uvula, tonsillar pillars, and posterior pharyngeal wall visible  Lungs:  Lungs clear with good breath sounds bilaterally  Heart:  Normal heart sounds and rate  History & Physical reviewed:  History and physical reviewed and no updates needed  Statement of review:  I have reviewed the lab findings, diagnostic data, medications, and the plan for sedation

## 2020-11-17 NOTE — PROGRESS NOTES
ICD replacement:  The atrial lead is known to be fractured. Capped.  The RV lead is fine, reused.  A single chamber ICD was placed.  No complications.  May be discharged around 4 pm.  Resume home medications.

## 2020-11-17 NOTE — DISCHARGE INSTRUCTIONS
Pacemaker/ICD Generator Change Discharge Instructions    After you go home:      Have an adult stay with you until tomorrow.    You may resume your normal diet.       For 24 hours - due to the sedation you received:    Relax and take it easy.    Do NOT make any important or legal decisions.    Do NOT drive or operate machines at home or at work.    Do NOT drink alcohol.    Care of Chest Incision:      Keep the bandage on at least 3 days. You may remove the dressing on Friday Nov 20th. Change it only if it gets loose or soaked. If you need to change it, use 4x4-inch gauze and a large clear bandage.     If there is a pressure dressing (gauze & tape) - 24 hours after your procedure you may remove ONLY the top dressing. Leave the bottom dressing on.    Leave the strips of tape on. They will fall off on their own, or we will remove them at your first check-up.    Check your wound daily for signs of infection, such as increased redness, severe swelling or draining. Fever may also be a sign of infection. Call us if you see any of these signs.    If there are no signs of infection, you may shower after the bandage comes off in 3 days. If you take a tub bath, keep the wound dry.    No soaking the incision (swimming pool, bathtub, hot tub) for 2 weeks.    You may have mild to medium pain for 3 to 5 days. Take Acetaminophen (Tylenol) or Ibuprofen (Advil) for the pain. If the pain persists or is severe, call us.    Activity:      You can begin to use your arm as it feels comfortable to you.    No driving for one day & limit to necessary driving for one week.    Bleeding:      If you start bleeding from the incision site, sit down and press firmly on the site for 10 minutes.     Once bleeding stops, call [facility] as soon as you can.       Call 911 right away if you have heavy bleeding or bleeding that does not stop.      Medicines:      Take your medications, including blood thinners, unless your provider tells you not  to.    If you have stopped any other medicines, check with your provider about when to restart them.    Follow Up Appointments:      Follow up with Device Clinic at [facility] of patient preference in 7-10 days.    Call the clinic if:      You have a large or growing hard lump around the site.    The site is red, swollen, hot or tender.    Blood or fluid is draining from the site.    You have chills or a fever greater than 101 F (38 C).    You feel dizzy or light-headed.    Questions or concerns.      Telling others about your device:      Before you leave the hospital, you will receive a temporary ID card. A permanent card will be mailed to you about 6 to 8 weeks later. Always carry the ID card with you. It has important details about your device.    You may also get a Medical Alert bracelet or tag that says you have a pacemaker or a defibrillator (ICD).  Go to [email or web address].     Always tell doctors, dentists and other care providers that you have a device implanted in you.    Let us know before you plan any surgeries. Your care team must take special steps to keep you safe during certain procedures. These steps will depend on the type of device you have. Your provider will need to see your ID card. They may need to call us for instructions.    Device Safety:      Please refer to device  s booklet for further information.        [facility] [place]:    [phone] [facility] (7 days a week)

## 2020-11-19 ENCOUNTER — TELEPHONE (OUTPATIENT)
Dept: CARDIOLOGY | Facility: CLINIC | Age: 70
End: 2020-11-19

## 2020-11-19 DIAGNOSIS — I25.5 ISCHEMIC CARDIOMYOPATHY: ICD-10-CM

## 2020-11-19 DIAGNOSIS — Z95.810 ICD (IMPLANTABLE CARDIOVERTER-DEFIBRILLATOR) IN PLACE: Primary | ICD-10-CM

## 2020-11-19 NOTE — TELEPHONE ENCOUNTER
Left message asking patient to call back to discuss below instructions following generator change:    Reviewed the following:  Remove outer dressing 3 days after implant. May shower after outer dressing removed. Leave steri-strips in place, will be removed at 1 week device check  Watch for redness, drainage, warmth, or fever. Call device clinic if any signs of infection.     1 week device check scheduled: pending. Needs to be scheduled    Pt states understanding of all instructions.

## 2020-11-20 NOTE — TELEPHONE ENCOUNTER
Patient called device clinic back and discharge instructions were reviewed. Patient stated he was told he could do his 7-10 day follow-up appointment virtually and wouldn't have to come into the clinic due to mobility issues. Looked and patient does have auto on for all lead measurements and patient agreed to send a photo of his incision to be reviewed.     Patient scheduled for remote device check on 11/24/2020. Reviewed how to send manual device transmission and encouraged patient to call with any additional questions.

## 2020-11-24 ENCOUNTER — TELEPHONE (OUTPATIENT)
Dept: CARDIOLOGY | Facility: CLINIC | Age: 70
End: 2020-11-24

## 2020-11-24 ENCOUNTER — ANCILLARY PROCEDURE (OUTPATIENT)
Dept: CARDIOLOGY | Facility: CLINIC | Age: 70
End: 2020-11-24
Attending: INTERNAL MEDICINE
Payer: COMMERCIAL

## 2020-11-24 DIAGNOSIS — Z95.810 ICD (IMPLANTABLE CARDIOVERTER-DEFIBRILLATOR) IN PLACE: ICD-10-CM

## 2020-11-24 DIAGNOSIS — I25.5 ISCHEMIC CARDIOMYOPATHY: ICD-10-CM

## 2020-11-24 PROCEDURE — 93295 DEV INTERROG REMOTE 1/2/MLT: CPT | Performed by: INTERNAL MEDICINE

## 2020-11-24 PROCEDURE — 93296 REM INTERROG EVL PM/IDS: CPT | Performed by: INTERNAL MEDICINE

## 2020-11-24 NOTE — TELEPHONE ENCOUNTER
Pt had 1 week ICD check scheduled as a remote check today instead of in-clinic due to mobility issues. I attempted to call pt with results and plan, and to have pt send a picture, but no answer and no VM. Will attempt again later.         [employer] Momentum (S) ICD Remote Device Check   (remote check instead of 7 day post gen change in-clinic, pt was unable to come to clinic due to mobility issues)  : 0 %    Mode: VVI 40    Presenting Rhythm: regular VS    Heart Rate: excellent variability     Sensing: slightly low but stable    Pacing Threshold: elevated but stable    Impedance: WNL    Battery Status: >12 yrs estimated longevity, 9.1 second charge time     Atrial Arrhythmia: NA    Ventricular Arrhythmia: none    ATP: none    Shocks: none    Care Plan: Scheduled remote in 3 months. Attempted to call pt with results and plan, no answer and no VM. ANDRES AREVALO

## 2020-11-25 NOTE — TELEPHONE ENCOUNTER
2nd attempt to call pt with remote results and to talk about sending in a picture of his incision. No answer, but VM worked this time. Left detailed VM to call Device Clinic number so he can send in a picture of his incision. Awaiting return call.

## 2020-12-01 LAB
MDC_IDC_EPISODE_DTM: NORMAL
MDC_IDC_EPISODE_DTM: NORMAL
MDC_IDC_EPISODE_ID: NORMAL
MDC_IDC_EPISODE_ID: NORMAL
MDC_IDC_EPISODE_TYPE: NORMAL
MDC_IDC_EPISODE_TYPE: NORMAL
MDC_IDC_LEAD_IMPLANT_DT: NORMAL
MDC_IDC_LEAD_LOCATION: NORMAL
MDC_IDC_LEAD_LOCATION_DETAIL_1: NORMAL
MDC_IDC_LEAD_MFG: NORMAL
MDC_IDC_LEAD_MODEL: NORMAL
MDC_IDC_LEAD_POLARITY_TYPE: NORMAL
MDC_IDC_LEAD_SERIAL: NORMAL
MDC_IDC_MSMT_BATTERY_DTM: NORMAL
MDC_IDC_MSMT_BATTERY_REMAINING_LONGEVITY: 144 MO
MDC_IDC_MSMT_BATTERY_REMAINING_PERCENTAGE: 100 %
MDC_IDC_MSMT_BATTERY_STATUS: NORMAL
MDC_IDC_MSMT_CAP_CHARGE_DTM: NORMAL
MDC_IDC_MSMT_CAP_CHARGE_TIME: 9.1 S
MDC_IDC_MSMT_CAP_CHARGE_TYPE: NORMAL
MDC_IDC_MSMT_LEADCHNL_RV_IMPEDANCE_VALUE: 345 OHM
MDC_IDC_MSMT_LEADCHNL_RV_PACING_THRESHOLD_AMPLITUDE: 1.9 V
MDC_IDC_MSMT_LEADCHNL_RV_PACING_THRESHOLD_PULSEWIDTH: 0.4 MS
MDC_IDC_PG_IMPLANT_DTM: NORMAL
MDC_IDC_PG_MFG: NORMAL
MDC_IDC_PG_MODEL: NORMAL
MDC_IDC_PG_SERIAL: NORMAL
MDC_IDC_PG_TYPE: NORMAL
MDC_IDC_SESS_CLINIC_NAME: NORMAL
MDC_IDC_SESS_DTM: NORMAL
MDC_IDC_SESS_TYPE: NORMAL
MDC_IDC_SET_BRADY_LOWRATE: 40 {BEATS}/MIN
MDC_IDC_SET_BRADY_MODE: NORMAL
MDC_IDC_SET_LEADCHNL_RV_PACING_AMPLITUDE: 3.8 V
MDC_IDC_SET_LEADCHNL_RV_PACING_CAPTURE_MODE: NORMAL
MDC_IDC_SET_LEADCHNL_RV_PACING_POLARITY: NORMAL
MDC_IDC_SET_LEADCHNL_RV_PACING_PULSEWIDTH: 0.4 MS
MDC_IDC_SET_LEADCHNL_RV_SENSING_ADAPTATION_MODE: NORMAL
MDC_IDC_SET_LEADCHNL_RV_SENSING_POLARITY: NORMAL
MDC_IDC_SET_LEADCHNL_RV_SENSING_SENSITIVITY: 0.6 MV
MDC_IDC_SET_ZONE_DETECTION_INTERVAL: 300 MS
MDC_IDC_SET_ZONE_DETECTION_INTERVAL: 353 MS
MDC_IDC_SET_ZONE_DETECTION_INTERVAL: 400 MS
MDC_IDC_SET_ZONE_TYPE: NORMAL
MDC_IDC_SET_ZONE_VENDOR_TYPE: NORMAL
MDC_IDC_STAT_BRADY_DTM_END: NORMAL
MDC_IDC_STAT_BRADY_DTM_START: NORMAL
MDC_IDC_STAT_BRADY_RV_PERCENT_PACED: 0 %
MDC_IDC_STAT_EPISODE_RECENT_COUNT: 0
MDC_IDC_STAT_EPISODE_RECENT_COUNT_DTM_END: NORMAL
MDC_IDC_STAT_EPISODE_RECENT_COUNT_DTM_START: NORMAL
MDC_IDC_STAT_EPISODE_TYPE: NORMAL
MDC_IDC_STAT_EPISODE_VENDOR_TYPE: NORMAL
MDC_IDC_STAT_TACHYTHERAPY_ATP_DELIVERED_RECENT: 0
MDC_IDC_STAT_TACHYTHERAPY_ATP_DELIVERED_TOTAL: 0
MDC_IDC_STAT_TACHYTHERAPY_RECENT_DTM_END: NORMAL
MDC_IDC_STAT_TACHYTHERAPY_RECENT_DTM_START: NORMAL
MDC_IDC_STAT_TACHYTHERAPY_SHOCKS_ABORTED_RECENT: 0
MDC_IDC_STAT_TACHYTHERAPY_SHOCKS_ABORTED_TOTAL: 0
MDC_IDC_STAT_TACHYTHERAPY_SHOCKS_DELIVERED_RECENT: 0
MDC_IDC_STAT_TACHYTHERAPY_SHOCKS_DELIVERED_TOTAL: 0
MDC_IDC_STAT_TACHYTHERAPY_TOTAL_DTM_END: NORMAL
MDC_IDC_STAT_TACHYTHERAPY_TOTAL_DTM_START: NORMAL

## 2020-12-01 NOTE — TELEPHONE ENCOUNTER
Mallory, device tech, spoke with pt today and asked him to send in a photo. The photo has not come in yet. I called pt and he said he'd send it again.     ADDENDUM 4:15PM. Photo received, see below. Called pt again and told him the incision looks good, his skin looks irritated from the tape, but otherwise no problems. Pt agrees. Informed pt of next remote check in February.

## 2021-01-30 NOTE — PROGRESS NOTES
Pt loss to follow up for his ICD. Left voice message 5/17/17. No response received. Mailed 1 week letter today. Machelle TONG   independent

## 2021-02-19 ENCOUNTER — DOCUMENTATION ONLY (OUTPATIENT)
Dept: CARDIOLOGY | Facility: CLINIC | Age: 71
End: 2021-02-19

## 2021-02-19 DIAGNOSIS — I25.10 CORONARY ARTERY DISEASE INVOLVING NATIVE CORONARY ARTERY OF NATIVE HEART WITHOUT ANGINA PECTORIS: Primary | ICD-10-CM

## 2021-02-19 DIAGNOSIS — Z95.810 ICD (IMPLANTABLE CARDIOVERTER-DEFIBRILLATOR) IN PLACE: ICD-10-CM

## 2021-02-19 NOTE — LETTER
February 19, 2021       TO: Go Saavedra  [address]  [place] [address]       Dear Go Saavedra,    We are reviewing our overdue orders and see that you are due for a visit with your cardiologist, as well as for labs.     Please call our clinic at [phone] to schedule your appointment as soon as possible.    Thank you,    [facility]

## 2021-02-23 ENCOUNTER — ANCILLARY PROCEDURE (OUTPATIENT)
Dept: CARDIOLOGY | Facility: CLINIC | Age: 71
End: 2021-02-23
Attending: INTERNAL MEDICINE
Payer: COMMERCIAL

## 2021-02-23 DIAGNOSIS — I25.5 ISCHEMIC CARDIOMYOPATHY: ICD-10-CM

## 2021-02-23 DIAGNOSIS — Z95.810 ICD (IMPLANTABLE CARDIOVERTER-DEFIBRILLATOR) IN PLACE: Primary | ICD-10-CM

## 2021-02-23 PROCEDURE — 93296 REM INTERROG EVL PM/IDS: CPT | Performed by: INTERNAL MEDICINE

## 2021-02-23 PROCEDURE — 93295 DEV INTERROG REMOTE 1/2/MLT: CPT | Performed by: INTERNAL MEDICINE

## 2021-02-24 LAB
MDC_IDC_EPISODE_DTM: NORMAL
MDC_IDC_EPISODE_DURATION: 14 S
MDC_IDC_EPISODE_ID: NORMAL
MDC_IDC_EPISODE_TYPE: NORMAL
MDC_IDC_EPISODE_VENDOR_TYPE: NORMAL
MDC_IDC_LEAD_IMPLANT_DT: NORMAL
MDC_IDC_LEAD_LOCATION: NORMAL
MDC_IDC_LEAD_LOCATION_DETAIL_1: NORMAL
MDC_IDC_LEAD_MFG: NORMAL
MDC_IDC_LEAD_MODEL: NORMAL
MDC_IDC_LEAD_POLARITY_TYPE: NORMAL
MDC_IDC_LEAD_SERIAL: NORMAL
MDC_IDC_MSMT_BATTERY_DTM: NORMAL
MDC_IDC_MSMT_BATTERY_REMAINING_LONGEVITY: 180 MO
MDC_IDC_MSMT_BATTERY_REMAINING_PERCENTAGE: 100 %
MDC_IDC_MSMT_BATTERY_STATUS: NORMAL
MDC_IDC_MSMT_CAP_CHARGE_DTM: NORMAL
MDC_IDC_MSMT_CAP_CHARGE_TIME: 9.1 S
MDC_IDC_MSMT_CAP_CHARGE_TYPE: NORMAL
MDC_IDC_MSMT_LEADCHNL_RV_IMPEDANCE_VALUE: 378 OHM
MDC_IDC_MSMT_LEADCHNL_RV_PACING_THRESHOLD_AMPLITUDE: 1.5 V
MDC_IDC_MSMT_LEADCHNL_RV_PACING_THRESHOLD_PULSEWIDTH: 0.4 MS
MDC_IDC_PG_IMPLANT_DTM: NORMAL
MDC_IDC_PG_MFG: NORMAL
MDC_IDC_PG_MODEL: NORMAL
MDC_IDC_PG_SERIAL: NORMAL
MDC_IDC_PG_TYPE: NORMAL
MDC_IDC_SESS_CLINIC_NAME: NORMAL
MDC_IDC_SESS_DTM: NORMAL
MDC_IDC_SESS_TYPE: NORMAL
MDC_IDC_SET_BRADY_LOWRATE: 40 {BEATS}/MIN
MDC_IDC_SET_BRADY_MODE: NORMAL
MDC_IDC_SET_LEADCHNL_RV_PACING_AMPLITUDE: 3.4 V
MDC_IDC_SET_LEADCHNL_RV_PACING_CAPTURE_MODE: NORMAL
MDC_IDC_SET_LEADCHNL_RV_PACING_POLARITY: NORMAL
MDC_IDC_SET_LEADCHNL_RV_PACING_PULSEWIDTH: 0.4 MS
MDC_IDC_SET_LEADCHNL_RV_SENSING_ADAPTATION_MODE: NORMAL
MDC_IDC_SET_LEADCHNL_RV_SENSING_POLARITY: NORMAL
MDC_IDC_SET_LEADCHNL_RV_SENSING_SENSITIVITY: 0.6 MV
MDC_IDC_SET_ZONE_DETECTION_INTERVAL: 300 MS
MDC_IDC_SET_ZONE_DETECTION_INTERVAL: 353 MS
MDC_IDC_SET_ZONE_DETECTION_INTERVAL: 400 MS
MDC_IDC_SET_ZONE_TYPE: NORMAL
MDC_IDC_SET_ZONE_VENDOR_TYPE: NORMAL
MDC_IDC_STAT_BRADY_DTM_END: NORMAL
MDC_IDC_STAT_BRADY_DTM_START: NORMAL
MDC_IDC_STAT_BRADY_RV_PERCENT_PACED: 0 %
MDC_IDC_STAT_EPISODE_RECENT_COUNT: 0
MDC_IDC_STAT_EPISODE_RECENT_COUNT: 1
MDC_IDC_STAT_EPISODE_RECENT_COUNT_DTM_END: NORMAL
MDC_IDC_STAT_EPISODE_RECENT_COUNT_DTM_START: NORMAL
MDC_IDC_STAT_EPISODE_TYPE: NORMAL
MDC_IDC_STAT_EPISODE_VENDOR_TYPE: NORMAL
MDC_IDC_STAT_TACHYTHERAPY_ATP_DELIVERED_RECENT: 0
MDC_IDC_STAT_TACHYTHERAPY_ATP_DELIVERED_TOTAL: 0
MDC_IDC_STAT_TACHYTHERAPY_RECENT_DTM_END: NORMAL
MDC_IDC_STAT_TACHYTHERAPY_RECENT_DTM_START: NORMAL
MDC_IDC_STAT_TACHYTHERAPY_SHOCKS_ABORTED_RECENT: 0
MDC_IDC_STAT_TACHYTHERAPY_SHOCKS_ABORTED_TOTAL: 0
MDC_IDC_STAT_TACHYTHERAPY_SHOCKS_DELIVERED_RECENT: 0
MDC_IDC_STAT_TACHYTHERAPY_SHOCKS_DELIVERED_TOTAL: 0
MDC_IDC_STAT_TACHYTHERAPY_TOTAL_DTM_END: NORMAL
MDC_IDC_STAT_TACHYTHERAPY_TOTAL_DTM_START: NORMAL

## 2021-05-27 ENCOUNTER — ANCILLARY PROCEDURE (OUTPATIENT)
Dept: CARDIOLOGY | Facility: CLINIC | Age: 71
End: 2021-05-27
Attending: INTERNAL MEDICINE
Payer: COMMERCIAL

## 2021-05-27 DIAGNOSIS — Z95.810 ICD (IMPLANTABLE CARDIOVERTER-DEFIBRILLATOR) IN PLACE: ICD-10-CM

## 2021-05-27 DIAGNOSIS — I25.5 ISCHEMIC CARDIOMYOPATHY: ICD-10-CM

## 2021-05-27 PROCEDURE — 93296 REM INTERROG EVL PM/IDS: CPT | Performed by: INTERNAL MEDICINE

## 2021-05-27 PROCEDURE — 93295 DEV INTERROG REMOTE 1/2/MLT: CPT | Performed by: INTERNAL MEDICINE

## 2021-06-10 LAB
MDC_IDC_EPISODE_DTM: NORMAL
MDC_IDC_EPISODE_DURATION: 59 S
MDC_IDC_EPISODE_ID: NORMAL
MDC_IDC_EPISODE_TYPE: NORMAL
MDC_IDC_EPISODE_VENDOR_TYPE: NORMAL
MDC_IDC_LEAD_IMPLANT_DT: NORMAL
MDC_IDC_LEAD_LOCATION: NORMAL
MDC_IDC_LEAD_LOCATION_DETAIL_1: NORMAL
MDC_IDC_LEAD_MFG: NORMAL
MDC_IDC_LEAD_MODEL: NORMAL
MDC_IDC_LEAD_POLARITY_TYPE: NORMAL
MDC_IDC_LEAD_SERIAL: NORMAL
MDC_IDC_MSMT_BATTERY_DTM: NORMAL
MDC_IDC_MSMT_BATTERY_REMAINING_LONGEVITY: 174 MO
MDC_IDC_MSMT_BATTERY_REMAINING_PERCENTAGE: 100 %
MDC_IDC_MSMT_BATTERY_STATUS: NORMAL
MDC_IDC_MSMT_CAP_CHARGE_DTM: NORMAL
MDC_IDC_MSMT_CAP_CHARGE_TIME: 9.5 S
MDC_IDC_MSMT_CAP_CHARGE_TYPE: NORMAL
MDC_IDC_MSMT_LEADCHNL_RV_IMPEDANCE_VALUE: 365 OHM
MDC_IDC_MSMT_LEADCHNL_RV_PACING_THRESHOLD_AMPLITUDE: 1.6 V
MDC_IDC_MSMT_LEADCHNL_RV_PACING_THRESHOLD_PULSEWIDTH: 0.4 MS
MDC_IDC_PG_IMPLANT_DTM: NORMAL
MDC_IDC_PG_MFG: NORMAL
MDC_IDC_PG_MODEL: NORMAL
MDC_IDC_PG_SERIAL: NORMAL
MDC_IDC_PG_TYPE: NORMAL
MDC_IDC_SESS_CLINIC_NAME: NORMAL
MDC_IDC_SESS_DTM: NORMAL
MDC_IDC_SESS_TYPE: NORMAL
MDC_IDC_SET_BRADY_LOWRATE: 40 {BEATS}/MIN
MDC_IDC_SET_BRADY_MODE: NORMAL
MDC_IDC_SET_LEADCHNL_RV_PACING_AMPLITUDE: 3.2 V
MDC_IDC_SET_LEADCHNL_RV_PACING_CAPTURE_MODE: NORMAL
MDC_IDC_SET_LEADCHNL_RV_PACING_POLARITY: NORMAL
MDC_IDC_SET_LEADCHNL_RV_PACING_PULSEWIDTH: 0.4 MS
MDC_IDC_SET_LEADCHNL_RV_SENSING_ADAPTATION_MODE: NORMAL
MDC_IDC_SET_LEADCHNL_RV_SENSING_POLARITY: NORMAL
MDC_IDC_SET_LEADCHNL_RV_SENSING_SENSITIVITY: 0.6 MV
MDC_IDC_SET_ZONE_DETECTION_INTERVAL: 300 MS
MDC_IDC_SET_ZONE_DETECTION_INTERVAL: 353 MS
MDC_IDC_SET_ZONE_DETECTION_INTERVAL: 400 MS
MDC_IDC_SET_ZONE_TYPE: NORMAL
MDC_IDC_SET_ZONE_VENDOR_TYPE: NORMAL
MDC_IDC_STAT_BRADY_DTM_END: NORMAL
MDC_IDC_STAT_BRADY_DTM_START: NORMAL
MDC_IDC_STAT_BRADY_RV_PERCENT_PACED: 0 %
MDC_IDC_STAT_EPISODE_RECENT_COUNT: 0
MDC_IDC_STAT_EPISODE_RECENT_COUNT: 1
MDC_IDC_STAT_EPISODE_RECENT_COUNT_DTM_END: NORMAL
MDC_IDC_STAT_EPISODE_RECENT_COUNT_DTM_START: NORMAL
MDC_IDC_STAT_EPISODE_TYPE: NORMAL
MDC_IDC_STAT_EPISODE_VENDOR_TYPE: NORMAL
MDC_IDC_STAT_TACHYTHERAPY_ATP_DELIVERED_RECENT: 0
MDC_IDC_STAT_TACHYTHERAPY_ATP_DELIVERED_TOTAL: 0
MDC_IDC_STAT_TACHYTHERAPY_RECENT_DTM_END: NORMAL
MDC_IDC_STAT_TACHYTHERAPY_RECENT_DTM_START: NORMAL
MDC_IDC_STAT_TACHYTHERAPY_SHOCKS_ABORTED_RECENT: 0
MDC_IDC_STAT_TACHYTHERAPY_SHOCKS_ABORTED_TOTAL: 0
MDC_IDC_STAT_TACHYTHERAPY_SHOCKS_DELIVERED_RECENT: 0
MDC_IDC_STAT_TACHYTHERAPY_SHOCKS_DELIVERED_TOTAL: 0
MDC_IDC_STAT_TACHYTHERAPY_TOTAL_DTM_END: NORMAL
MDC_IDC_STAT_TACHYTHERAPY_TOTAL_DTM_START: NORMAL

## 2021-08-30 ENCOUNTER — ANCILLARY PROCEDURE (OUTPATIENT)
Dept: CARDIOLOGY | Facility: CLINIC | Age: 71
End: 2021-08-30
Attending: INTERNAL MEDICINE
Payer: COMMERCIAL

## 2021-08-30 DIAGNOSIS — I25.5 ISCHEMIC CARDIOMYOPATHY: ICD-10-CM

## 2021-08-30 DIAGNOSIS — Z95.810 ICD (IMPLANTABLE CARDIOVERTER-DEFIBRILLATOR) IN PLACE: ICD-10-CM

## 2021-08-30 PROCEDURE — 93296 REM INTERROG EVL PM/IDS: CPT | Performed by: INTERNAL MEDICINE

## 2021-08-30 PROCEDURE — 93295 DEV INTERROG REMOTE 1/2/MLT: CPT | Performed by: INTERNAL MEDICINE

## 2021-09-14 LAB
MDC_IDC_EPISODE_DTM: NORMAL
MDC_IDC_EPISODE_DURATION: 118 S
MDC_IDC_EPISODE_DURATION: 12 S
MDC_IDC_EPISODE_DURATION: 13 S
MDC_IDC_EPISODE_DURATION: 13 S
MDC_IDC_EPISODE_DURATION: 14 S
MDC_IDC_EPISODE_DURATION: 153 S
MDC_IDC_EPISODE_DURATION: 17 S
MDC_IDC_EPISODE_DURATION: 20 S
MDC_IDC_EPISODE_DURATION: 22 S
MDC_IDC_EPISODE_DURATION: 24 S
MDC_IDC_EPISODE_DURATION: 26 S
MDC_IDC_EPISODE_DURATION: 28 S
MDC_IDC_EPISODE_DURATION: 38 S
MDC_IDC_EPISODE_DURATION: 46 S
MDC_IDC_EPISODE_DURATION: 58 S
MDC_IDC_EPISODE_DURATION: 70 S
MDC_IDC_EPISODE_DURATION: 75 S
MDC_IDC_EPISODE_ID: NORMAL
MDC_IDC_EPISODE_TYPE: NORMAL
MDC_IDC_EPISODE_VENDOR_TYPE: NORMAL
MDC_IDC_LEAD_IMPLANT_DT: NORMAL
MDC_IDC_LEAD_LOCATION: NORMAL
MDC_IDC_LEAD_LOCATION_DETAIL_1: NORMAL
MDC_IDC_LEAD_MFG: NORMAL
MDC_IDC_LEAD_MODEL: NORMAL
MDC_IDC_LEAD_POLARITY_TYPE: NORMAL
MDC_IDC_LEAD_SERIAL: NORMAL
MDC_IDC_MSMT_BATTERY_DTM: NORMAL
MDC_IDC_MSMT_BATTERY_REMAINING_LONGEVITY: 174 MO
MDC_IDC_MSMT_BATTERY_REMAINING_PERCENTAGE: 100 %
MDC_IDC_MSMT_BATTERY_STATUS: NORMAL
MDC_IDC_MSMT_CAP_CHARGE_DTM: NORMAL
MDC_IDC_MSMT_CAP_CHARGE_TIME: 9.5 S
MDC_IDC_MSMT_CAP_CHARGE_TYPE: NORMAL
MDC_IDC_MSMT_LEADCHNL_RV_IMPEDANCE_VALUE: 371 OHM
MDC_IDC_MSMT_LEADCHNL_RV_PACING_THRESHOLD_AMPLITUDE: 1.6 V
MDC_IDC_MSMT_LEADCHNL_RV_PACING_THRESHOLD_PULSEWIDTH: 0.4 MS
MDC_IDC_PG_IMPLANT_DTM: NORMAL
MDC_IDC_PG_MFG: NORMAL
MDC_IDC_PG_MODEL: NORMAL
MDC_IDC_PG_SERIAL: NORMAL
MDC_IDC_PG_TYPE: NORMAL
MDC_IDC_SESS_CLINIC_NAME: NORMAL
MDC_IDC_SESS_DTM: NORMAL
MDC_IDC_SESS_TYPE: NORMAL
MDC_IDC_SET_BRADY_LOWRATE: 40 {BEATS}/MIN
MDC_IDC_SET_BRADY_MODE: NORMAL
MDC_IDC_SET_LEADCHNL_RV_PACING_AMPLITUDE: 3.2 V
MDC_IDC_SET_LEADCHNL_RV_PACING_CAPTURE_MODE: NORMAL
MDC_IDC_SET_LEADCHNL_RV_PACING_POLARITY: NORMAL
MDC_IDC_SET_LEADCHNL_RV_PACING_PULSEWIDTH: 0.4 MS
MDC_IDC_SET_LEADCHNL_RV_SENSING_ADAPTATION_MODE: NORMAL
MDC_IDC_SET_LEADCHNL_RV_SENSING_POLARITY: NORMAL
MDC_IDC_SET_LEADCHNL_RV_SENSING_SENSITIVITY: 0.6 MV
MDC_IDC_SET_ZONE_DETECTION_INTERVAL: 300 MS
MDC_IDC_SET_ZONE_DETECTION_INTERVAL: 353 MS
MDC_IDC_SET_ZONE_DETECTION_INTERVAL: 400 MS
MDC_IDC_SET_ZONE_TYPE: NORMAL
MDC_IDC_SET_ZONE_VENDOR_TYPE: NORMAL
MDC_IDC_STAT_BRADY_DTM_END: NORMAL
MDC_IDC_STAT_BRADY_DTM_START: NORMAL
MDC_IDC_STAT_BRADY_RV_PERCENT_PACED: 0 %
MDC_IDC_STAT_EPISODE_RECENT_COUNT: 0
MDC_IDC_STAT_EPISODE_RECENT_COUNT: 3
MDC_IDC_STAT_EPISODE_RECENT_COUNT_DTM_END: NORMAL
MDC_IDC_STAT_EPISODE_RECENT_COUNT_DTM_START: NORMAL
MDC_IDC_STAT_EPISODE_TYPE: NORMAL
MDC_IDC_STAT_EPISODE_VENDOR_TYPE: NORMAL
MDC_IDC_STAT_TACHYTHERAPY_ATP_DELIVERED_RECENT: 0
MDC_IDC_STAT_TACHYTHERAPY_ATP_DELIVERED_TOTAL: 0
MDC_IDC_STAT_TACHYTHERAPY_RECENT_DTM_END: NORMAL
MDC_IDC_STAT_TACHYTHERAPY_RECENT_DTM_START: NORMAL
MDC_IDC_STAT_TACHYTHERAPY_SHOCKS_ABORTED_RECENT: 0
MDC_IDC_STAT_TACHYTHERAPY_SHOCKS_ABORTED_TOTAL: 0
MDC_IDC_STAT_TACHYTHERAPY_SHOCKS_DELIVERED_RECENT: 0
MDC_IDC_STAT_TACHYTHERAPY_SHOCKS_DELIVERED_TOTAL: 0
MDC_IDC_STAT_TACHYTHERAPY_TOTAL_DTM_END: NORMAL
MDC_IDC_STAT_TACHYTHERAPY_TOTAL_DTM_START: NORMAL

## 2021-11-01 ENCOUNTER — ANCILLARY PROCEDURE (OUTPATIENT)
Dept: CARDIOLOGY | Facility: CLINIC | Age: 71
End: 2021-11-01
Attending: INTERNAL MEDICINE
Payer: COMMERCIAL

## 2021-11-01 DIAGNOSIS — I25.5 ISCHEMIC CARDIOMYOPATHY: ICD-10-CM

## 2021-11-01 DIAGNOSIS — Z95.810 ICD (IMPLANTABLE CARDIOVERTER-DEFIBRILLATOR) IN PLACE: ICD-10-CM

## 2021-11-01 PROCEDURE — 93282 PRGRMG EVAL IMPLANTABLE DFB: CPT | Performed by: INTERNAL MEDICINE

## 2021-11-10 LAB
MDC_IDC_LEAD_IMPLANT_DT: NORMAL
MDC_IDC_LEAD_LOCATION: NORMAL
MDC_IDC_LEAD_LOCATION_DETAIL_1: NORMAL
MDC_IDC_LEAD_MFG: NORMAL
MDC_IDC_LEAD_MODEL: NORMAL
MDC_IDC_LEAD_POLARITY_TYPE: NORMAL
MDC_IDC_LEAD_SERIAL: NORMAL
MDC_IDC_MSMT_BATTERY_DTM: NORMAL
MDC_IDC_MSMT_BATTERY_REMAINING_LONGEVITY: 174 MO
MDC_IDC_MSMT_BATTERY_REMAINING_PERCENTAGE: 100 %
MDC_IDC_MSMT_BATTERY_STATUS: NORMAL
MDC_IDC_MSMT_CAP_CHARGE_DTM: NORMAL
MDC_IDC_MSMT_CAP_CHARGE_TIME: 9.5 S
MDC_IDC_MSMT_CAP_CHARGE_TYPE: NORMAL
MDC_IDC_MSMT_LEADCHNL_RV_IMPEDANCE_VALUE: 377 OHM
MDC_IDC_MSMT_LEADCHNL_RV_PACING_THRESHOLD_AMPLITUDE: 1.6 V
MDC_IDC_MSMT_LEADCHNL_RV_PACING_THRESHOLD_PULSEWIDTH: 0.4 MS
MDC_IDC_PG_IMPLANT_DTM: NORMAL
MDC_IDC_PG_MFG: NORMAL
MDC_IDC_PG_MODEL: NORMAL
MDC_IDC_PG_SERIAL: NORMAL
MDC_IDC_PG_TYPE: NORMAL
MDC_IDC_SESS_CLINIC_NAME: NORMAL
MDC_IDC_SESS_DTM: NORMAL
MDC_IDC_SESS_TYPE: NORMAL
MDC_IDC_SET_BRADY_LOWRATE: 40 {BEATS}/MIN
MDC_IDC_SET_BRADY_MODE: NORMAL
MDC_IDC_SET_LEADCHNL_RV_PACING_AMPLITUDE: 3 V
MDC_IDC_SET_LEADCHNL_RV_PACING_CAPTURE_MODE: NORMAL
MDC_IDC_SET_LEADCHNL_RV_PACING_POLARITY: NORMAL
MDC_IDC_SET_LEADCHNL_RV_PACING_PULSEWIDTH: 0.4 MS
MDC_IDC_SET_LEADCHNL_RV_SENSING_ADAPTATION_MODE: NORMAL
MDC_IDC_SET_LEADCHNL_RV_SENSING_POLARITY: NORMAL
MDC_IDC_SET_LEADCHNL_RV_SENSING_SENSITIVITY: 0.6 MV
MDC_IDC_SET_ZONE_DETECTION_INTERVAL: 300 MS
MDC_IDC_SET_ZONE_DETECTION_INTERVAL: 353 MS
MDC_IDC_SET_ZONE_DETECTION_INTERVAL: 400 MS
MDC_IDC_SET_ZONE_TYPE: NORMAL
MDC_IDC_SET_ZONE_VENDOR_TYPE: NORMAL
MDC_IDC_STAT_BRADY_DTM_END: NORMAL
MDC_IDC_STAT_BRADY_DTM_START: NORMAL
MDC_IDC_STAT_BRADY_RV_PERCENT_PACED: 0 %
MDC_IDC_STAT_EPISODE_RECENT_COUNT: 0
MDC_IDC_STAT_EPISODE_RECENT_COUNT: 1
MDC_IDC_STAT_EPISODE_RECENT_COUNT: 5
MDC_IDC_STAT_EPISODE_RECENT_COUNT_DTM_END: NORMAL
MDC_IDC_STAT_EPISODE_RECENT_COUNT_DTM_START: NORMAL
MDC_IDC_STAT_EPISODE_TYPE: NORMAL
MDC_IDC_STAT_EPISODE_VENDOR_TYPE: NORMAL
MDC_IDC_STAT_TACHYTHERAPY_ATP_DELIVERED_RECENT: 0
MDC_IDC_STAT_TACHYTHERAPY_ATP_DELIVERED_TOTAL: 0
MDC_IDC_STAT_TACHYTHERAPY_RECENT_DTM_END: NORMAL
MDC_IDC_STAT_TACHYTHERAPY_RECENT_DTM_START: NORMAL
MDC_IDC_STAT_TACHYTHERAPY_SHOCKS_ABORTED_RECENT: 0
MDC_IDC_STAT_TACHYTHERAPY_SHOCKS_ABORTED_TOTAL: 0
MDC_IDC_STAT_TACHYTHERAPY_SHOCKS_DELIVERED_RECENT: 0
MDC_IDC_STAT_TACHYTHERAPY_SHOCKS_DELIVERED_TOTAL: 0
MDC_IDC_STAT_TACHYTHERAPY_TOTAL_DTM_END: NORMAL
MDC_IDC_STAT_TACHYTHERAPY_TOTAL_DTM_START: NORMAL

## 2022-02-14 ENCOUNTER — TELEPHONE (OUTPATIENT)
Dept: CARDIOLOGY | Facility: CLINIC | Age: 72
End: 2022-02-14
Payer: COMMERCIAL

## 2022-02-14 DIAGNOSIS — I25.10 CORONARY ARTERY DISEASE INVOLVING NATIVE CORONARY ARTERY OF NATIVE HEART WITHOUT ANGINA PECTORIS: Primary | ICD-10-CM

## 2022-02-14 DIAGNOSIS — I25.5 ISCHEMIC CARDIOMYOPATHY: ICD-10-CM

## 2022-02-14 DIAGNOSIS — I73.9 PAD (PERIPHERAL ARTERY DISEASE) (H): ICD-10-CM

## 2022-03-14 ENCOUNTER — ANCILLARY PROCEDURE (OUTPATIENT)
Dept: CARDIOLOGY | Facility: CLINIC | Age: 72
End: 2022-03-14
Attending: INTERNAL MEDICINE
Payer: COMMERCIAL

## 2022-03-14 ENCOUNTER — TELEPHONE (OUTPATIENT)
Dept: CARDIOLOGY | Facility: CLINIC | Age: 72
End: 2022-03-14

## 2022-03-14 DIAGNOSIS — Z51.81 ENCOUNTER FOR MONITORING SOTALOL THERAPY: ICD-10-CM

## 2022-03-14 DIAGNOSIS — I25.5 ISCHEMIC CARDIOMYOPATHY: ICD-10-CM

## 2022-03-14 DIAGNOSIS — I47.20 VENTRICULAR TACHYCARDIA (H): Primary | ICD-10-CM

## 2022-03-14 DIAGNOSIS — Z95.810 ICD (IMPLANTABLE CARDIOVERTER-DEFIBRILLATOR) IN PLACE: ICD-10-CM

## 2022-03-14 DIAGNOSIS — Z79.899 ENCOUNTER FOR MONITORING SOTALOL THERAPY: ICD-10-CM

## 2022-03-14 LAB
MDC_IDC_EPISODE_DTM: NORMAL
MDC_IDC_EPISODE_DURATION: 14 S
MDC_IDC_EPISODE_DURATION: 15 S
MDC_IDC_EPISODE_DURATION: 16 S
MDC_IDC_EPISODE_DURATION: 20 S
MDC_IDC_EPISODE_ID: NORMAL
MDC_IDC_EPISODE_TYPE: NORMAL
MDC_IDC_EPISODE_VENDOR_TYPE: NORMAL
MDC_IDC_LEAD_IMPLANT_DT: NORMAL
MDC_IDC_LEAD_LOCATION: NORMAL
MDC_IDC_LEAD_LOCATION_DETAIL_1: NORMAL
MDC_IDC_LEAD_MFG: NORMAL
MDC_IDC_LEAD_MODEL: NORMAL
MDC_IDC_LEAD_POLARITY_TYPE: NORMAL
MDC_IDC_LEAD_SERIAL: NORMAL
MDC_IDC_MSMT_BATTERY_DTM: NORMAL
MDC_IDC_MSMT_BATTERY_REMAINING_LONGEVITY: 156 MO
MDC_IDC_MSMT_BATTERY_REMAINING_PERCENTAGE: 100 %
MDC_IDC_MSMT_BATTERY_STATUS: NORMAL
MDC_IDC_MSMT_CAP_CHARGE_DTM: NORMAL
MDC_IDC_MSMT_CAP_CHARGE_TIME: 9.6 S
MDC_IDC_MSMT_CAP_CHARGE_TYPE: NORMAL
MDC_IDC_MSMT_LEADCHNL_RV_IMPEDANCE_VALUE: 382 OHM
MDC_IDC_MSMT_LEADCHNL_RV_PACING_THRESHOLD_AMPLITUDE: 1.3 V
MDC_IDC_MSMT_LEADCHNL_RV_PACING_THRESHOLD_PULSEWIDTH: 0.4 MS
MDC_IDC_PG_IMPLANT_DTM: NORMAL
MDC_IDC_PG_MFG: NORMAL
MDC_IDC_PG_MODEL: NORMAL
MDC_IDC_PG_SERIAL: NORMAL
MDC_IDC_PG_TYPE: NORMAL
MDC_IDC_SESS_CLINIC_NAME: NORMAL
MDC_IDC_SESS_DTM: NORMAL
MDC_IDC_SESS_TYPE: NORMAL
MDC_IDC_SET_BRADY_LOWRATE: 40 {BEATS}/MIN
MDC_IDC_SET_BRADY_MODE: NORMAL
MDC_IDC_SET_LEADCHNL_RV_PACING_AMPLITUDE: 3.4 V
MDC_IDC_SET_LEADCHNL_RV_PACING_CAPTURE_MODE: NORMAL
MDC_IDC_SET_LEADCHNL_RV_PACING_POLARITY: NORMAL
MDC_IDC_SET_LEADCHNL_RV_PACING_PULSEWIDTH: 0.4 MS
MDC_IDC_SET_LEADCHNL_RV_SENSING_ADAPTATION_MODE: NORMAL
MDC_IDC_SET_LEADCHNL_RV_SENSING_POLARITY: NORMAL
MDC_IDC_SET_LEADCHNL_RV_SENSING_SENSITIVITY: 0.6 MV
MDC_IDC_SET_ZONE_DETECTION_INTERVAL: 300 MS
MDC_IDC_SET_ZONE_DETECTION_INTERVAL: 353 MS
MDC_IDC_SET_ZONE_DETECTION_INTERVAL: 400 MS
MDC_IDC_SET_ZONE_TYPE: NORMAL
MDC_IDC_SET_ZONE_VENDOR_TYPE: NORMAL
MDC_IDC_STAT_BRADY_DTM_END: NORMAL
MDC_IDC_STAT_BRADY_DTM_START: NORMAL
MDC_IDC_STAT_BRADY_RV_PERCENT_PACED: 0 %
MDC_IDC_STAT_EPISODE_RECENT_COUNT: 0
MDC_IDC_STAT_EPISODE_RECENT_COUNT: 0
MDC_IDC_STAT_EPISODE_RECENT_COUNT: 2
MDC_IDC_STAT_EPISODE_RECENT_COUNT: 2
MDC_IDC_STAT_EPISODE_RECENT_COUNT: 24
MDC_IDC_STAT_EPISODE_RECENT_COUNT_DTM_END: NORMAL
MDC_IDC_STAT_EPISODE_RECENT_COUNT_DTM_START: NORMAL
MDC_IDC_STAT_EPISODE_TYPE: NORMAL
MDC_IDC_STAT_EPISODE_VENDOR_TYPE: NORMAL
MDC_IDC_STAT_TACHYTHERAPY_ATP_DELIVERED_RECENT: 24
MDC_IDC_STAT_TACHYTHERAPY_ATP_DELIVERED_TOTAL: 24
MDC_IDC_STAT_TACHYTHERAPY_RECENT_DTM_END: NORMAL
MDC_IDC_STAT_TACHYTHERAPY_RECENT_DTM_START: NORMAL
MDC_IDC_STAT_TACHYTHERAPY_SHOCKS_ABORTED_RECENT: 24
MDC_IDC_STAT_TACHYTHERAPY_SHOCKS_ABORTED_TOTAL: 24
MDC_IDC_STAT_TACHYTHERAPY_SHOCKS_DELIVERED_RECENT: 0
MDC_IDC_STAT_TACHYTHERAPY_SHOCKS_DELIVERED_TOTAL: 0
MDC_IDC_STAT_TACHYTHERAPY_TOTAL_DTM_END: NORMAL
MDC_IDC_STAT_TACHYTHERAPY_TOTAL_DTM_START: NORMAL

## 2022-03-14 PROCEDURE — 93295 DEV INTERROG REMOTE 1/2/MLT: CPT | Performed by: INTERNAL MEDICINE

## 2022-03-14 PROCEDURE — 93296 REM INTERROG EVL PM/IDS: CPT | Performed by: INTERNAL MEDICINE

## 2022-03-14 NOTE — TELEPHONE ENCOUNTER
[employer] Momentum (S) ICD Remote Device Check  : 0 %    Mode: VVI 40    Presenting Rhythm: Regular VS     Heart Rate: Stable with good variability     Sensing: WNL    Pacing Threshold: WNL    Impedance: WNL    Battery Status: 13 years      Ventricular Arrhythmia: 2 NSVT episodes logged. 1 EGM shows NSVT lasting 8 beats with rate in the 210s. The other shows no change in farfield morphology suggestive of brief PAT.   44 VT episodes logged.   10 available EGMs are suggestive of RVR in the 150s (slighltly irregular with very little change in farfield morphology).   24 VF episodes logged. Each of these episodes was treated with ATP x1. 18 available EGMs. 17 of these show VT in the 210s-220s which is successfully converted with ATPx1.  1 round of ATP was unsucessful but the episode self terminated with a total duration of 10 seconds. All of these episodes in the VF zone occurred on 3/11/22-3/12/22.   ATP: 24    Shocks: 0    Care Plan: Patient is scheduled for follow up with Dr. Deleon tomorrow. Follow up with remote device check in June.  Left voicemail for patient requesting call back to discuss symptoms. Will route update to Dr. Deleon after discussing symptoms with patient.

## 2022-03-15 RX ORDER — SOTALOL HYDROCHLORIDE 160 MG/1
160 TABLET ORAL 2 TIMES DAILY
Qty: 180 TABLET | Refills: 3 | Status: SHIPPED | OUTPATIENT
Start: 2022-03-15 | End: 2022-05-18

## 2022-03-15 RX ORDER — METOPROLOL SUCCINATE 100 MG/1
1 TABLET, EXTENDED RELEASE ORAL DAILY
COMMUNITY
Start: 2020-07-28 | End: 2022-03-15 | Stop reason: ALTCHOICE

## 2022-03-15 RX ORDER — LISINOPRIL 40 MG/1
20 TABLET ORAL DAILY
COMMUNITY
Start: 2020-07-28 | End: 2022-05-18

## 2022-03-15 NOTE — TELEPHONE ENCOUNTER
Stop metoprolol.  Start sotalol 160 mg bid and have an ECG in 1 week for QT monitoring. Will accept QT up to 500 ms.

## 2022-03-15 NOTE — TELEPHONE ENCOUNTER
"Remote ICD alert received today for more ATP episodes. Pt was supposed to have OV with Dr. Deleon today, but he cancelled the OV due to \"other problems due to his amputation\" per scheduling note.     Since remote check completed yesterday (with data up to 3/12/2022, see Telma RN's note below), pt has had 7 more episodes in the VF zone, 6 with ATP x1, and 1 converted spontaneously after 3 seconds. The EGMs for the 6 ATP episodes all show VT at 215 - 225 bpm for 3.5 - 4 seconds, then successful ATP. This brings his total ATP episodes to 30 since 3/11/2022.     Called pt. 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 (Notes in Care Everywhere). He is having a lot of pain in his leg, but he denies any cardiovascular symptoms, specifically he denies CP, SOB, lightheadedness, or palpitations. He said his only recent med change is that he is now on Xarelto (added to med list). He has not missed any doses of meds. He currently takes metoprolol succinate 100mg daily, lisinopril 20mg daily, lasix 20mg daily, and spironolactone 75mg daily.     Pt last saw Dr. LEONARD 11/2020 before his ICD gen change. He was supposed to see Dr. Deleon today, but he cancelled due to his leg pain. ICD was originally implanted in 2008 due to sustained VT, pt had one shock on his old device, and has never been shocked on his current device. Will review with Dr. Leonard.                         "

## 2022-03-22 NOTE — TELEPHONE ENCOUNTER
Epic and Care Everywhere reviewed. Pt saw his vascular MD last week and it was recommended that they continue wound care efforts and return in 4 weeks for f/u, so pt is not currently in the hospital. So, he should have EKG done probably tomorrow either here or at [facility].     Called pt. His wife Frances answered the phone, pt was sleeping and Frances does not want to wake him up right now because he has been sleeping poorly. Frances said they will come to [facility] for EKG and they prefer afternoon appointments. I told Frances it should be done ideally tomorrow 3/23, but 3/24 would be okay too. Frances wrote down scheduling phone number so she can call if needed. I sent message to scheduling to call and schedule EKG.

## 2022-03-23 ENCOUNTER — TRANSFERRED RECORDS (OUTPATIENT)
Dept: HEALTH INFORMATION MANAGEMENT | Facility: CLINIC | Age: 72
End: 2022-03-23
Payer: COMMERCIAL

## 2022-03-23 NOTE — TELEPHONE ENCOUNTER
Received faxed EKG from [facility], ordered by Dr. Leonard for QT monitoring because pt is on sotalol. Dr. Leonard's note below says he will accept a QT measurement up to 500ms.     EKG copied below, sent copy to scan. Difficult to read the faxed copy, but measurements listed are QT 446ms and QTc 504ms.     Will review with Dr. Leonard

## 2022-03-23 NOTE — TELEPHONE ENCOUNTER
Pt is scheduled for EKG today at [facility] ([facility]). Called [facility], asked an RN to have patient's EKG faxed to us today after it is completed.

## 2022-03-23 NOTE — TELEPHONE ENCOUNTER
Called pt, no answer, left VM that his EKG was reviewed by Dr. Leonard and looks good, so he can continue current dose of sotalol. Left Device Clinic RN number in case pt has questions.

## 2022-04-21 DIAGNOSIS — I25.10 CORONARY ARTERY DISEASE INVOLVING NATIVE CORONARY ARTERY OF NATIVE HEART WITHOUT ANGINA PECTORIS: Primary | ICD-10-CM

## 2022-04-25 ENCOUNTER — TELEPHONE (OUTPATIENT)
Dept: CARDIOLOGY | Facility: CLINIC | Age: 72
End: 2022-04-25
Payer: COMMERCIAL

## 2022-04-25 NOTE — TELEPHONE ENCOUNTER
Remote alert received for VF episodes which were appropriately treated with ATP.   He had 2 episodes on 4/22/22 and 3 episodes on 4/23/22. Each of these episodes show VF in the 220s which is appropriately terminated by ATPx1.   Patient states that he got lightheaded once while laying down this weekend but is unable to recall when this occurred. He denies any missed doses of sotalol this weekend.   Will route to Dr. Leonard.

## 2022-05-10 ENCOUNTER — PRE VISIT (OUTPATIENT)
Dept: CARDIOLOGY | Facility: CLINIC | Age: 72
End: 2022-05-10
Payer: COMMERCIAL

## 2022-05-18 ENCOUNTER — OFFICE VISIT (OUTPATIENT)
Dept: CARDIOLOGY | Facility: CLINIC | Age: 72
End: 2022-05-18
Attending: INTERNAL MEDICINE
Payer: COMMERCIAL

## 2022-05-18 VITALS
SYSTOLIC BLOOD PRESSURE: 122 MMHG | WEIGHT: 189 LBS | HEIGHT: 68 IN | OXYGEN SATURATION: 99 % | BODY MASS INDEX: 28.64 KG/M2 | HEART RATE: 65 BPM | DIASTOLIC BLOOD PRESSURE: 70 MMHG

## 2022-05-18 DIAGNOSIS — I25.5 ISCHEMIC CARDIOMYOPATHY: ICD-10-CM

## 2022-05-18 DIAGNOSIS — I10 BENIGN ESSENTIAL HYPERTENSION: ICD-10-CM

## 2022-05-18 DIAGNOSIS — I47.20 VENTRICULAR TACHYCARDIA (H): ICD-10-CM

## 2022-05-18 DIAGNOSIS — Z79.4 TYPE 2 DIABETES MELLITUS WITH OTHER CIRCULATORY COMPLICATION, WITH LONG-TERM CURRENT USE OF INSULIN (H): ICD-10-CM

## 2022-05-18 DIAGNOSIS — E78.2 MIXED DYSLIPIDEMIA: ICD-10-CM

## 2022-05-18 DIAGNOSIS — I25.10 CORONARY ARTERY DISEASE INVOLVING NATIVE CORONARY ARTERY OF NATIVE HEART WITHOUT ANGINA PECTORIS: Primary | ICD-10-CM

## 2022-05-18 DIAGNOSIS — E11.59 TYPE 2 DIABETES MELLITUS WITH OTHER CIRCULATORY COMPLICATION, WITH LONG-TERM CURRENT USE OF INSULIN (H): ICD-10-CM

## 2022-05-18 DIAGNOSIS — Z95.810 ICD (IMPLANTABLE CARDIOVERTER-DEFIBRILLATOR) IN PLACE: ICD-10-CM

## 2022-05-18 PROCEDURE — 99214 OFFICE O/P EST MOD 30 MIN: CPT | Performed by: INTERNAL MEDICINE

## 2022-05-18 PROCEDURE — 93000 ELECTROCARDIOGRAM COMPLETE: CPT | Performed by: INTERNAL MEDICINE

## 2022-05-18 RX ORDER — FEXOFENADINE HCL 180 MG/1
180 TABLET ORAL DAILY
COMMUNITY

## 2022-05-18 RX ORDER — SPIRONOLACTONE 25 MG/1
75 TABLET ORAL EVERY MORNING
Qty: 300 TABLET | Refills: 4 | Status: SHIPPED | OUTPATIENT
Start: 2022-05-18

## 2022-05-18 RX ORDER — ACETAMINOPHEN 500 MG
500-1000 TABLET ORAL EVERY 6 HOURS PRN
COMMUNITY

## 2022-05-18 RX ORDER — SOTALOL HYDROCHLORIDE 160 MG/1
160 TABLET ORAL 2 TIMES DAILY
Qty: 200 TABLET | Refills: 4 | Status: SHIPPED | OUTPATIENT
Start: 2022-05-18 | End: 2024-01-25

## 2022-05-18 RX ORDER — LISINOPRIL 40 MG/1
20 TABLET ORAL DAILY
Qty: 50 TABLET | Refills: 4 | Status: SHIPPED | OUTPATIENT
Start: 2022-05-18

## 2022-05-18 RX ORDER — FUROSEMIDE 20 MG
20 TABLET ORAL EVERY MORNING
Qty: 100 TABLET | Refills: 4 | Status: SHIPPED | OUTPATIENT
Start: 2022-05-18 | End: 2022-07-01

## 2022-05-18 NOTE — PATIENT INSTRUCTIONS
1.  No medication changes today.  I have renewed your cardiac prescriptions.    2.  Please consider an alternative to statin such as:  - Zetia (ezetimibe).  This is an oral pill.  - An injectable cholesterol-lowering medication called PCSK9 inhibitor such as Repatha.    3.  If you choose to try one of these statin alternatives, please call my office at the number below.    4.  Transthoracic echocardiogram and follow-up with electrophysiology [facility] in 6 months.    If you have any questions or concerns, please contact my nurses at [phone].

## 2022-05-18 NOTE — LETTER
5/18/2022    Edwina Rodriguez MD  [facility] [address] [place] [address]    RE: Go Saavedra       Dear Colleague,     I had the pleasure of seeing Go Saavedra in the [facility].  Clinic visit note dictated. Dictation reference number - [id number]          Today's clinic visit entailed:  Review of the result(s) of each unique test - ECG, labs, echocardiogram.  Ordering of each unique test  Prescription drug management  40 minutes spent on the date of the encounter doing chart review, history and exam, documentation and further activities per the note          CURRENT MEDICATIONS:  Current Outpatient Medications   Medication Sig Dispense Refill     acetaminophen (TYLENOL) 500 MG tablet Take 500-1,000 mg by mouth every 6 hours as needed for mild pain       Aspirin (ASPIR-81 PO) Take 81 mg by mouth daily        fexofenadine (ALLEGRA) 180 MG tablet Take 180 mg by mouth daily       furosemide (LASIX) 20 MG tablet Take 1 tablet (20 mg) by mouth every morning 100 tablet 4     insulin aspart (NOVOLOG PEN) 100 UNIT/ML pen Inject 8 Units Subcutaneous 3 times daily (with meals)        insulin glargine (BASAGLAR KWIKPEN) 100 UNIT/ML pen Inject 12 Units Subcutaneous At Bedtime        insulin pen needle (30G X 8 MM) 30G X 8 MM miscellaneous Use 5 pen needles daily or as directed.       lisinopril (ZESTRIL) 40 MG tablet Take 0.5 tablets (20 mg) by mouth daily 50 tablet 4     metFORMIN (GLUCOPHAGE-XR) 500 MG 24 hr tablet Take 500 mg by mouth daily (with dinner)       nitroGLYcerin (NITROSTAT) 0.4 MG sublingual tablet Place 0.4 mg under the tongue every 5 minutes as needed for chest pain For chest pain place 1 tablet under the tongue every 5 minutes for 3 doses. If symptoms persist 5 minutes after 1st dose call 911.       pregabalin (LYRICA) 75 MG capsule Take 75 mg by mouth 2 times daily       sotalol (BETAPACE) 160 MG tablet Take 1 tablet (160 mg) by mouth 2 times daily  200 tablet 4     spironolactone (ALDACTONE) 25 MG tablet Take 3 tablets (75 mg) by mouth every morning 300 tablet 4         ALLERGIES:  No Known Allergies    PAST MEDICAL HISTORY:    Past Medical History:   Diagnosis Date     Coronary artery disease      Diabetes mellitus (H)      History of coronary artery bypass graft x 3 2008     Hyperlipidemia      Ischemic cardiomyopathy 2008     MI (myocardial infarction) (H) 11/2008    inferior     PAD (peripheral artery disease) (H) 12/31/2019    S/p angioplasty of right posterior tibial artery on 2/19/2019; s/p angioplasty of distal posterior tibial artery to right foot 4/4/19       Ventricular tachycardia (H)        PAST SURGICAL HISTORY:    Past Surgical History:   Procedure Laterality Date     BYPASS GRAFT ARTERY CORONARY  11/2008    3 vessel w/ LIMA to LAD, SVG to Diagl, SVG to PDA     ENDOSCOPIC RETROGRADE CHOLANGIOPANCREATOGRAM N/A 10/6/2018    Procedure: ENDOSCOPIC RETROGRADE CHOLANGIOPANCREATOGRAM;  ENDOSCOPIC ULTRASOUND, ENDOSCOPIC RETROGRADE CHOLANGIOPANCREATOGRAM (MAC);  Surgeon: Oc Yang MD;  Location:  OR     EP ICD GENERATOR REPLACEMENT DUAL N/A 11/17/2020    Procedure: EP ICD Generator Change Dual;  Surgeon: Coty Leonard MD;  Location:  HEART CARDIAC CATH LAB     ESOPHAGOSCOPY, GASTROSCOPY, DUODENOSCOPY (EGD), COMBINED N/A 10/6/2018    Procedure: COMBINED ENDOSCOPIC ULTRASOUND, ESOPHAGOSCOPY, GASTROSCOPY, DUODENOSCOPY (EGD);;  Surgeon: Oc Yang MD;  Location:  OR      LEFT HEART CATHETERIZATION  10/2008    Emergent thrombectomy and stent to distal RCA     IMPLANT IMPLANTABLE CARDIOVERTER DEFIBRILLATOR  11/2008    dual chamber ICD     LAPAROSCOPIC CHOLECYSTECTOMY N/A 10/7/2018    Procedure: LAPAROSCOPIC CHOLECYSTECTOMY;   Laparoscopic Cholecystectomy;  Surgeon: Dhiraj Matute MD;  Location:  OR       FAMILY HISTORY:    Family History   Problem Relation Age of Onset     Cancer Brother      Diabetes No family hx of   "    Hypertension No family hx of        SOCIAL HISTORY:    Social History     Socioeconomic History     Marital status:      Spouse name: None     Number of children: None     Years of education: None     Highest education level: None   Tobacco Use     Smoking status: Never Smoker     Smokeless tobacco: Never Used   Substance and Sexual Activity     Alcohol use: No       PHYSICAL EXAM:    Vitals: /70   Pulse 65   Ht 1.727 m (5' 8\")   Wt 85.7 kg (189 lb)   SpO2 99%   BMI 28.74 kg/m    Wt Readings from Last 5 Encounters:   05/18/22 85.7 kg (189 lb)   11/17/20 97.5 kg (215 lb)   11/13/20 97.5 kg (215 lb)   01/22/20 110.1 kg (242 lb 11.2 oz)   10/08/18 117 kg (257 lb 15 oz)         Encounter Diagnoses   Name Primary?     Coronary artery disease involving native coronary artery of native heart without angina pectoris Yes     Ischemic cardiomyopathy      Ventricular tachycardia (H)      ICD (implantable cardioverter-defibrillator) in place      Type 2 diabetes mellitus with other circulatory complication, with long-term current use of insulin (H)      Mixed dyslipidemia      Benign essential hypertension        Orders Placed This Encounter   Procedures     Follow-Up with Cardiology MARK     Echocardiogram Complete                         Service Date: 05/18/2022    PROVIDER:  Edwina Rodriguez MD    REASON FOR VISIT:  Scheduled followup of coronary artery disease, peripheral arterial disease, ischemic cardiomyopathy.    HISTORY OF PRESENT ILLNESS:  It was my pleasure to follow up with Ralph who is a delightful 72-year-old  male, retired , unaccompanied today.    He is in a wheelchair following a recent left above-knee amputation on 04/12/2022.    His cardiovascular history is significant for:  1.  Premature CAD, status post delayed presentation of inferior MI (5 days later due to atypical symptoms of heartburn), leading to CABG x3 in 11/2008 (LIMA to LAD, vein grafts to diagonal and " posterior descending artery).  LVEF 45%-50%, inferior wall akinesis.  2.  Mild ischemic cardiomyopathy with improvement in LVEF from 35% to 50%.  3.  Implant a defibrillator for nonsustained ventricular tachycardia.  4.  Type 2 diabetes mellitus complicated by peripheral neuropathy and peripheral vascular disease.  5.  Peripheral vascular disease, status post right toe amputation, left above-knee amputation in 04/2022.  6.  Never tobacco user.  7.  Dyslipidemia with very low HDL.    The last few months have been problematic for Go.  He was hospitalized.  He had COVID-19 in mid April, which was largely uncomplicated.  Subsequently, he was admitted for problems with a nonhealing wound of his left below-knee stump and subsequent, other therapies failed and he underwent an above-knee amputation on 04/12/2022, from which he is healing.  He rarely uses oxycodone.  His wife helps with his home wound care.  Overall, improving.    He does not have any angina or heart failure symptoms.  BP is 122/70, pulse of 65 (sinus).  Creatinine is normal at 0.68.  His last lipid panel showed a total cholesterol of 167, HDL 22, LDL not at goal at 112, triglycerides 163.  He is not on a statin due to myalgias.  Currently, not on any lipid-lowering therapy.    The other issue is that his device check in March showed several episodes of nonsustained ventricular tachycardia that required ATP.  He was advised by his heart rhythm specialist, Dr. Leonard, to stop metoprolol and start sotalol 160 mg 2 times daily and this is being monitored by them.    INTERVAL DIAGNOSES:  1.  Mixed dyslipidemia with statin intolerance.  I have talked to him about ezetimibe versus PCSK9 inhibitor.  He wants to think about this.  2.  Nonsustained ventricular tachycardia in the context of revascularized coronary artery disease (with CABG) and mild ischemic cardiomyopathy.  Being managed by Dr. Leonard in heart rhythm services.  On sotalol.  3.  Severe peripheral  arterial disease with a recent left above-knee amputation.  Healing.  4.  Benign essential hypertension.  Well controlled at 122/70.  Continue current meds.    PLAN:    1.  No medication changes today.  2.  I talked to him in detail about a statin alternative such as Zetia, which would be an oral pill, ___ and a PCSK9 inhibitor.  If he chooses to try one of these statin alternatives, he will call my office and we will start the prior authorization process.  Contact details provided.    3.  In 6 months, he will have a transthoracic echocardiogram and follow up with cardiology MARK.    Nga Deleon MD    cc:  Edwina Rodriguez MD  [facility]  [address] [place], [phone]    Nga Deleon MD        D: 2022   T: 2022   MT: [email or web address]    Name:     AUDREY MENDEZ  MRN:      [id number]        Account:      [id number]   :      1950           Service Date: 2022       Document: [id number]      Thank you for allowing me to participate in the care of your patient.      Sincerely,     Nga Deleon MD     [facility]  cc:   Nga Deleon MD  [address] [place] [id number]

## 2022-05-18 NOTE — PROGRESS NOTES
Clinic visit note dictated. Dictation reference number - [id number]          Today's clinic visit entailed:  Review of the result(s) of each unique test - ECG, labs, echocardiogram.  Ordering of each unique test  Prescription drug management  40 minutes spent on the date of the encounter doing chart review, history and exam, documentation and further activities per the note          CURRENT MEDICATIONS:  Current Outpatient Medications   Medication Sig Dispense Refill     acetaminophen (TYLENOL) 500 MG tablet Take 500-1,000 mg by mouth every 6 hours as needed for mild pain       Aspirin (ASPIR-81 PO) Take 81 mg by mouth daily        fexofenadine (ALLEGRA) 180 MG tablet Take 180 mg by mouth daily       furosemide (LASIX) 20 MG tablet Take 1 tablet (20 mg) by mouth every morning 100 tablet 4     insulin aspart (NOVOLOG PEN) 100 UNIT/ML pen Inject 8 Units Subcutaneous 3 times daily (with meals)        insulin glargine (BASAGLAR KWIKPEN) 100 UNIT/ML pen Inject 12 Units Subcutaneous At Bedtime        insulin pen needle (30G X 8 MM) 30G X 8 MM miscellaneous Use 5 pen needles daily or as directed.       lisinopril (ZESTRIL) 40 MG tablet Take 0.5 tablets (20 mg) by mouth daily 50 tablet 4     metFORMIN (GLUCOPHAGE-XR) 500 MG 24 hr tablet Take 500 mg by mouth daily (with dinner)       nitroGLYcerin (NITROSTAT) 0.4 MG sublingual tablet Place 0.4 mg under the tongue every 5 minutes as needed for chest pain For chest pain place 1 tablet under the tongue every 5 minutes for 3 doses. If symptoms persist 5 minutes after 1st dose call 911.       pregabalin (LYRICA) 75 MG capsule Take 75 mg by mouth 2 times daily       sotalol (BETAPACE) 160 MG tablet Take 1 tablet (160 mg) by mouth 2 times daily 200 tablet 4     spironolactone (ALDACTONE) 25 MG tablet Take 3 tablets (75 mg) by mouth every morning 300 tablet 4         ALLERGIES:  No Known Allergies    PAST MEDICAL HISTORY:    Past Medical History:   Diagnosis Date     Coronary artery  disease      Diabetes mellitus (H)      History of coronary artery bypass graft x 3 2008     Hyperlipidemia      Ischemic cardiomyopathy 2008     MI (myocardial infarction) (H) 11/2008    inferior     PAD (peripheral artery disease) (H) 12/31/2019    S/p angioplasty of right posterior tibial artery on 2/19/2019; s/p angioplasty of distal posterior tibial artery to right foot 4/4/19       Ventricular tachycardia (H)        PAST SURGICAL HISTORY:    Past Surgical History:   Procedure Laterality Date     BYPASS GRAFT ARTERY CORONARY  11/2008    3 vessel w/ LIMA to LAD, SVG to Diagl, SVG to PDA     ENDOSCOPIC RETROGRADE CHOLANGIOPANCREATOGRAM N/A 10/6/2018    Procedure: ENDOSCOPIC RETROGRADE CHOLANGIOPANCREATOGRAM;  ENDOSCOPIC ULTRASOUND, ENDOSCOPIC RETROGRADE CHOLANGIOPANCREATOGRAM (MAC);  Surgeon: Oc Yang MD;  Location:  OR     EP ICD GENERATOR REPLACEMENT DUAL N/A 11/17/2020    Procedure: EP ICD Generator Change Dual;  Surgeon: Coty Leonard MD;  Location:  HEART CARDIAC CATH LAB     ESOPHAGOSCOPY, GASTROSCOPY, DUODENOSCOPY (EGD), COMBINED N/A 10/6/2018    Procedure: COMBINED ENDOSCOPIC ULTRASOUND, ESOPHAGOSCOPY, GASTROSCOPY, DUODENOSCOPY (EGD);;  Surgeon: Oc Yang MD;  Location:  OR      LEFT HEART CATHETERIZATION  10/2008    Emergent thrombectomy and stent to distal RCA     IMPLANT IMPLANTABLE CARDIOVERTER DEFIBRILLATOR  11/2008    dual chamber ICD     LAPAROSCOPIC CHOLECYSTECTOMY N/A 10/7/2018    Procedure: LAPAROSCOPIC CHOLECYSTECTOMY;   Laparoscopic Cholecystectomy;  Surgeon: Dhiraj Matute MD;  Location:  OR       FAMILY HISTORY:    Family History   Problem Relation Age of Onset     Cancer Brother      Diabetes No family hx of      Hypertension No family hx of        SOCIAL HISTORY:    Social History     Socioeconomic History     Marital status:      Spouse name: None     Number of children: None     Years of education: None     Highest education level: None  "  Tobacco Use     Smoking status: Never Smoker     Smokeless tobacco: Never Used   Substance and Sexual Activity     Alcohol use: No       PHYSICAL EXAM:    Vitals: /70   Pulse 65   Ht 1.727 m (5' 8\")   Wt 85.7 kg (189 lb)   SpO2 99%   BMI 28.74 kg/m    Wt Readings from Last 5 Encounters:   05/18/22 85.7 kg (189 lb)   11/17/20 97.5 kg (215 lb)   11/13/20 97.5 kg (215 lb)   01/22/20 110.1 kg (242 lb 11.2 oz)   10/08/18 117 kg (257 lb 15 oz)         Encounter Diagnoses   Name Primary?     Coronary artery disease involving native coronary artery of native heart without angina pectoris Yes     Ischemic cardiomyopathy      Ventricular tachycardia (H)      ICD (implantable cardioverter-defibrillator) in place      Type 2 diabetes mellitus with other circulatory complication, with long-term current use of insulin (H)      Mixed dyslipidemia      Benign essential hypertension        Orders Placed This Encounter   Procedures     Follow-Up with Cardiology MARK     Echocardiogram Complete                       "

## 2022-06-08 ENCOUNTER — TELEPHONE (OUTPATIENT)
Dept: CARDIOLOGY | Facility: CLINIC | Age: 72
End: 2022-06-08
Payer: COMMERCIAL

## 2022-06-08 DIAGNOSIS — I25.10 CORONARY ARTERY DISEASE INVOLVING NATIVE CORONARY ARTERY OF NATIVE HEART WITHOUT ANGINA PECTORIS: Primary | ICD-10-CM

## 2022-06-08 DIAGNOSIS — I50.41 ACUTE COMBINED SYSTOLIC (CONGESTIVE) AND DIASTOLIC (CONGESTIVE) HEART FAILURE (H): ICD-10-CM

## 2022-06-08 NOTE — TELEPHONE ENCOUNTER
Spoke with Patient who states he has a Below knee  amputation on his Left leg and a few months ago had his Right toes amputated Hx; of PVD and DM. .    Patient saw a provider at [facility]  yesterday who noted that the top of the foot and up a little into the calf was slightly edematous and was instructed to call his cardiologist.    Patient states the top of the r foot has been edematous for the past few week, Unchanged.     Unable to weight himself. Last weight was April 12-22 when discharged from [facility] and weight was 182 lbs.   Patient sates he has been eating more and drinking about a gallon of water/day.   Currently on Lasix 20 mg daily. And spironolactone  75 mg daily.     Last [facility] vascular visit 5-12-22. Wt was 189 lbs.     Last Dr. Deleon OV 5-18-22, Wt: 189 lbs at OV.      PCP post Hospital OV 5-2-22 via telemedicine,

## 2022-06-08 NOTE — TELEPHONE ENCOUNTER
Health Call Center    Phone Message    May a detailed message be left on voicemail: yes     Reason for Call: Other: Patient was recently seen at the [facility] regarding his five toes that are amputated on his right foot. The provider he saw noticed swelling on his foot, and wanted to know if he could have a possible medication change. Please contact patient to discuss further, thank you.     Action Taken: Message routed to:  Other: Cardiology    Travel Screening: Not Applicable

## 2022-06-13 ENCOUNTER — ANCILLARY PROCEDURE (OUTPATIENT)
Dept: CARDIOLOGY | Facility: CLINIC | Age: 72
End: 2022-06-13
Attending: INTERNAL MEDICINE
Payer: COMMERCIAL

## 2022-06-13 DIAGNOSIS — Z95.810 ICD (IMPLANTABLE CARDIOVERTER-DEFIBRILLATOR) IN PLACE: ICD-10-CM

## 2022-06-13 DIAGNOSIS — I25.5 ISCHEMIC CARDIOMYOPATHY: ICD-10-CM

## 2022-06-13 PROCEDURE — 93295 DEV INTERROG REMOTE 1/2/MLT: CPT | Performed by: INTERNAL MEDICINE

## 2022-06-13 PROCEDURE — 93296 REM INTERROG EVL PM/IDS: CPT | Performed by: INTERNAL MEDICINE

## 2022-06-13 NOTE — TELEPHONE ENCOUNTER
Reply from Dr. Deleon:  1.  Patient needs to see both cardiology MARK (with labs) for cardiovascular issues and his primary provider for an overall evaluation.     Thank you.   SJ     Order placed for MARK visit with bmp, BNP and CBC.     Spoke with patient, he states his foot is a bit puffy. He denies any issues with SOB or obvious fluid retention in his body. Patient asks that we send the orders for labs over to [facility] with Dr. Rodriugez at [phone]. He will call to set those up.  Patient is also following with his PCP.    Message to scheduling to call patient with MARK visit.

## 2022-06-14 LAB
MDC_IDC_EPISODE_DTM: NORMAL
MDC_IDC_EPISODE_DTM: NORMAL
MDC_IDC_EPISODE_ID: NORMAL
MDC_IDC_EPISODE_ID: NORMAL
MDC_IDC_EPISODE_TYPE: NORMAL
MDC_IDC_EPISODE_TYPE: NORMAL
MDC_IDC_LEAD_IMPLANT_DT: NORMAL
MDC_IDC_LEAD_LOCATION: NORMAL
MDC_IDC_LEAD_LOCATION_DETAIL_1: NORMAL
MDC_IDC_LEAD_MFG: NORMAL
MDC_IDC_LEAD_MODEL: NORMAL
MDC_IDC_LEAD_POLARITY_TYPE: NORMAL
MDC_IDC_LEAD_SERIAL: NORMAL
MDC_IDC_MSMT_BATTERY_DTM: NORMAL
MDC_IDC_MSMT_BATTERY_REMAINING_LONGEVITY: 174 MO
MDC_IDC_MSMT_BATTERY_REMAINING_PERCENTAGE: 100 %
MDC_IDC_MSMT_BATTERY_STATUS: NORMAL
MDC_IDC_MSMT_CAP_CHARGE_DTM: NORMAL
MDC_IDC_MSMT_CAP_CHARGE_TIME: 9.6 S
MDC_IDC_MSMT_CAP_CHARGE_TYPE: NORMAL
MDC_IDC_MSMT_LEADCHNL_RV_IMPEDANCE_VALUE: 365 OHM
MDC_IDC_MSMT_LEADCHNL_RV_PACING_THRESHOLD_AMPLITUDE: 1.5 V
MDC_IDC_MSMT_LEADCHNL_RV_PACING_THRESHOLD_PULSEWIDTH: 0.4 MS
MDC_IDC_PG_IMPLANT_DTM: NORMAL
MDC_IDC_PG_MFG: NORMAL
MDC_IDC_PG_MODEL: NORMAL
MDC_IDC_PG_SERIAL: NORMAL
MDC_IDC_PG_TYPE: NORMAL
MDC_IDC_SESS_CLINIC_NAME: NORMAL
MDC_IDC_SESS_DTM: NORMAL
MDC_IDC_SESS_TYPE: NORMAL
MDC_IDC_SET_BRADY_LOWRATE: 40 {BEATS}/MIN
MDC_IDC_SET_BRADY_MODE: NORMAL
MDC_IDC_SET_LEADCHNL_RV_PACING_AMPLITUDE: 3.2 V
MDC_IDC_SET_LEADCHNL_RV_PACING_CAPTURE_MODE: NORMAL
MDC_IDC_SET_LEADCHNL_RV_PACING_POLARITY: NORMAL
MDC_IDC_SET_LEADCHNL_RV_PACING_PULSEWIDTH: 0.4 MS
MDC_IDC_SET_LEADCHNL_RV_SENSING_ADAPTATION_MODE: NORMAL
MDC_IDC_SET_LEADCHNL_RV_SENSING_POLARITY: NORMAL
MDC_IDC_SET_LEADCHNL_RV_SENSING_SENSITIVITY: 0.6 MV
MDC_IDC_SET_ZONE_DETECTION_INTERVAL: 300 MS
MDC_IDC_SET_ZONE_DETECTION_INTERVAL: 353 MS
MDC_IDC_SET_ZONE_DETECTION_INTERVAL: 400 MS
MDC_IDC_SET_ZONE_TYPE: NORMAL
MDC_IDC_SET_ZONE_VENDOR_TYPE: NORMAL
MDC_IDC_STAT_BRADY_DTM_END: NORMAL
MDC_IDC_STAT_BRADY_DTM_START: NORMAL
MDC_IDC_STAT_BRADY_RV_PERCENT_PACED: 0 %
MDC_IDC_STAT_EPISODE_RECENT_COUNT: 0
MDC_IDC_STAT_EPISODE_RECENT_COUNT: 0
MDC_IDC_STAT_EPISODE_RECENT_COUNT: 3
MDC_IDC_STAT_EPISODE_RECENT_COUNT: 37
MDC_IDC_STAT_EPISODE_RECENT_COUNT: 4
MDC_IDC_STAT_EPISODE_RECENT_COUNT_DTM_END: NORMAL
MDC_IDC_STAT_EPISODE_RECENT_COUNT_DTM_START: NORMAL
MDC_IDC_STAT_EPISODE_TYPE: NORMAL
MDC_IDC_STAT_EPISODE_VENDOR_TYPE: NORMAL
MDC_IDC_STAT_TACHYTHERAPY_ATP_DELIVERED_RECENT: 37
MDC_IDC_STAT_TACHYTHERAPY_ATP_DELIVERED_TOTAL: 37
MDC_IDC_STAT_TACHYTHERAPY_RECENT_DTM_END: NORMAL
MDC_IDC_STAT_TACHYTHERAPY_RECENT_DTM_START: NORMAL
MDC_IDC_STAT_TACHYTHERAPY_SHOCKS_ABORTED_RECENT: 37
MDC_IDC_STAT_TACHYTHERAPY_SHOCKS_ABORTED_TOTAL: 37
MDC_IDC_STAT_TACHYTHERAPY_SHOCKS_DELIVERED_RECENT: 0
MDC_IDC_STAT_TACHYTHERAPY_SHOCKS_DELIVERED_TOTAL: 0
MDC_IDC_STAT_TACHYTHERAPY_TOTAL_DTM_END: NORMAL
MDC_IDC_STAT_TACHYTHERAPY_TOTAL_DTM_START: NORMAL

## 2022-06-17 NOTE — PROGRESS NOTES
Service Date: 05/18/2022    PROVIDER:  Edwina Rodriguez MD    REASON FOR VISIT:  Scheduled followup of coronary artery disease, peripheral arterial disease, ischemic cardiomyopathy.    HISTORY OF PRESENT ILLNESS:    It was my pleasure to follow up with Ralph who is a delightful 72-year-old  male, retired , unaccompanied today.    He is in a wheelchair following a recent left above-knee amputation on 04/12/2022.    His cardiovascular history is significant for:  1.  Premature CAD, status post delayed presentation of inferior MI (5 days later due to atypical symptoms of heartburn), leading to CABG x3 in 11/2008 (LIMA to LAD, vein grafts to diagonal and posterior descending artery).  LVEF 45%-50%, inferior wall akinesis.  2.  Mild ischemic cardiomyopathy with improvement in LVEF from 35% to 50%.  3.  Implant a defibrillator for nonsustained ventricular tachycardia.  4.  Type 2 diabetes mellitus complicated by peripheral neuropathy and peripheral vascular disease.  5.  Peripheral vascular disease, status post right toe amputation, left above-knee amputation in 04/2022.  6.  Never tobacco user.  7.  Dyslipidemia with very low HDL.    The last few months have been problematic for Go.  He was hospitalized.  He had COVID-19 in mid April, which was largely uncomplicated.  Subsequently, he was admitted for problems with a nonhealing wound of his left below-knee stump and subsequent, other therapies failed and he underwent an above-knee amputation on 04/12/2022, from which he is healing.  He rarely uses oxycodone.  His wife helps with his home wound care.  Overall, improving.    He does not have any angina or heart failure symptoms.  BP is 122/70, pulse of 65 (sinus).  Creatinine is normal at 0.68.  His last lipid panel showed a total cholesterol of 167, HDL 22, LDL not at goal at 112, triglycerides 163.  He is not on a statin due to myalgias.  Currently, not on any lipid-lowering therapy.    The  other issue is that his device check in March showed several episodes of nonsustained ventricular tachycardia that required ATP.  He was advised by his heart rhythm specialist, Dr. Leonard, to stop metoprolol and start sotalol 160 mg 2 times daily and this is being monitored by them.    INTERVAL DIAGNOSES:  1.  Mixed dyslipidemia with statin intolerance.  I have talked to him about ezetimibe versus PCSK9 inhibitor.  He wants to think about this.  2.  Nonsustained ventricular tachycardia in the context of revascularized coronary artery disease (with CABG) and mild ischemic cardiomyopathy.  Being managed by Dr. Leonard in heart rhythm services.  On sotalol.  3.  Severe peripheral arterial disease with a recent left above-knee amputation.  Healing.  4.  Benign essential hypertension.  Well controlled at 122/70.  Continue current meds.    PLAN:    1.  No medication changes today.  2.  I talked to him in detail about a statin alternative such as Zetia, which would be an oral pill and a PCSK9 inhibitor.  If he chooses to try one of these statin alternatives, he will call my office and we will start the prior authorization process.  Contact details provided.    3.  In 6 months, he will have a transthoracic echocardiogram and follow up with cardiology MARK.    Total time today: 30 minutes.    Nga Deleon MD    cc:  Edwina Rodriguez MD  [facility] [address] [place], [phone]    Nga Deleon MD        D: 2022   T: 2022   MTKristina tao    Name:     AUDREY MENDEZ  MRN:      [id number]        Account:      [id number]   :      1950           Service Date: 2022       Document: [id number]

## 2022-07-01 ENCOUNTER — TELEPHONE (OUTPATIENT)
Dept: CARDIOLOGY | Facility: CLINIC | Age: 72
End: 2022-07-01

## 2022-07-01 ENCOUNTER — OFFICE VISIT (OUTPATIENT)
Dept: CARDIOLOGY | Facility: CLINIC | Age: 72
End: 2022-07-01
Attending: INTERNAL MEDICINE
Payer: COMMERCIAL

## 2022-07-01 VITALS
HEIGHT: 68 IN | OXYGEN SATURATION: 97 % | BODY MASS INDEX: 29.7 KG/M2 | HEART RATE: 59 BPM | DIASTOLIC BLOOD PRESSURE: 82 MMHG | WEIGHT: 196 LBS | SYSTOLIC BLOOD PRESSURE: 140 MMHG

## 2022-07-01 DIAGNOSIS — I25.5 ISCHEMIC CARDIOMYOPATHY: ICD-10-CM

## 2022-07-01 DIAGNOSIS — I25.10 CORONARY ARTERY DISEASE INVOLVING NATIVE CORONARY ARTERY OF NATIVE HEART WITHOUT ANGINA PECTORIS: ICD-10-CM

## 2022-07-01 DIAGNOSIS — R60.0 LOWER EXTREMITY EDEMA: Primary | ICD-10-CM

## 2022-07-01 DIAGNOSIS — I10 BENIGN ESSENTIAL HYPERTENSION: ICD-10-CM

## 2022-07-01 PROCEDURE — 99215 OFFICE O/P EST HI 40 MIN: CPT | Performed by: NURSE PRACTITIONER

## 2022-07-01 RX ORDER — FUROSEMIDE 20 MG
40 TABLET ORAL DAILY
Qty: 180 TABLET | Refills: 3 | Status: SHIPPED | OUTPATIENT
Start: 2022-07-01

## 2022-07-01 NOTE — PATIENT INSTRUCTIONS
Today's Plan:     1) Increase your lasix (furosemide) to 40 mg daily.     2) Repeat BMP to check kidney and electrolytes in 1-2 weeks.     3) Repeat echocardiogram.     If you have questions or concerns please call my nurse team at [phone].       Scheduling phone number: [phone]    Reminder: Please bring in all current medications, over the counter supplements and vitamin bottles to your next appointment.-    It was a pleasure seeing you today!     Ansley Godwin CNP  7/1/2022    -

## 2022-07-01 NOTE — LETTER
7/1/2022    Edwina Rodriguez MD  [facility] [address] [place] [address]    RE: Go Saavedra       Dear Colleague,     I had the pleasure of seeing Go Saavedra in the [facility].  Cardiology Clinic Progress Note  Go Saavedra MRN# [id number]   YOB: 1950 Age: 72 year old     Primary cardiologist: Dr. Deleon     Reason for visit: worsening edema     History of presenting illness:    Go Saavedra is a pleasant 72 year old patient with past medical history significant for coronary artery disease status post CABG x3 in 2008, mild ischemic cardiomyopathy, hyperlipidemia, hypertension, ventricular tachycardia status post ICD, type 2 diabetes complicated by peripheral neuropathy and peripheral vascular disease status post recent left AKA on 4/12/2022 who is here today with concerns of worsening edema of right lower extremity.    He was last seen by Dr. Deleon on 5/18/2022 at which time he was doing well from a cardiovascular standpoint.  He had no concerning signs of ischemia or heart failure.  Blood pressure was well controlled.  Plan was to follow-up in 6 months with repeat transthoracic echocardiogram at that time.    He had a device check in March that showed several episodes of nonsustained ventricular tachycardia that required ATP.  He was seen by Dr. Leonard and advised to stop metoprolol and start sotalol 160 mg twice daily.    More recently, he reports worsening edema in his right lower extremity.  He has no symptoms of shortness of breath, PND, or orthopnea.  He has no concerning signs of angina.  His blood pressure is slightly elevated today at 140/82.    I reviewed his most recent echocardiogram from 2018 that shows mildly reduced LV function with an EF of 50%.  There is moderate concentric LVH, normal RV size and function, and no valvular pathology.    His BMP from 6/27/2022 shows creatinine of 0.75, GFR greater than 60, BUN 19, and  normal electrolytes.         Assessment and Plan:     ASSESSMENT:    1. CAD s/p 3v CABG (LIMA-LAD, SVG-Diag, SVG-PDA).     2. Mild ischemic cardiomyopathy with LVEF 35-50%. Most recent TTE from 2018 with LVEF 50%.  On Lasix 20 mg daily.  3. NSVT s/p ICD. On sotalol 160 mg BID.   4. HLD. Not currently on lipid lowering agents, intolerant to statins.   5. HTN.  Slightly hypertensive today although historically well controlled.  6. Type II diabetes. Insulin dependent.   7. Peripheral vascular disease . S/p AKA in 4/2022.  8. Right lower extremity edema. Worsening 2+ edema over the past couple weeks.       PLAN:     1. Increase lasix to 40 mg daily.   2. Obtain echocardiogram.   3. Repeat BMP in 1 week.   4. Plan to follow-up with Dr. Deleon in 6 months, as previously arranged, sooner if needed.          Ansley Godwin, KERON, APRN, CNP  Page: [phone] (8a-5p M-F)    Orders this Visit:  Orders Placed This Encounter   Procedures     Basic metabolic panel     Echocardiogram Complete     Orders Placed This Encounter   Medications     furosemide (LASIX) 20 MG tablet     Sig: Take 2 tablets (40 mg) by mouth daily     Dispense:  180 tablet     Refill:  3     Medications Discontinued During This Encounter   Medication Reason     pregabalin (LYRICA) 75 MG capsule Stopped by Patient     furosemide (LASIX) 20 MG tablet        Today's clinic visit entailed:  Review of the result(s) of each unique test - echo, BMP  Ordering of each unique test  Prescription drug management  45 minutes spent on the date of the encounter doing chart review, review of test results, interpretation of tests, patient visit and documentation   Provider  Link to [facility] Help Grid     The level of medical decision making during this visit was of moderate complexity.           Review of Systems:     Review of Systems:  Skin:  not assessed     Eyes:  not assessed    ENT:  not assessed    Respiratory:  Negative    Cardiovascular:    edema;Positive  "for;lightheadedness  Gastroenterology: not assessed    Genitourinary:  not assessed    Musculoskeletal:  not assessed    Neurologic:  not assessed    Psychiatric:  not assessed    Heme/Lymph/Imm:  not assessed    Endocrine:  Positive for diabetes            Physical Exam:   Vitals: BP (!) 140/82   Pulse 59   Ht 1.727 m (5' 8\")   Wt 88.9 kg (196 lb)   SpO2 97%   BMI 29.80 kg/m    Constitutional:  cooperative, alert and oriented, well developed, well nourished, in no acute distress        Skin:  warm and dry to the touch        Head:  normocephalic        Eyes:  pupils equal and round        ENT:           Neck:  JVP normal        Chest:  normal breath sounds, clear to auscultation, normal A-P diameter, normal symmetry, normal respiratory excursion, no use of accessory muscles        Cardiac: regular rhythm;normal S1 and S2;no murmurs, gallops or rubs detected                  Abdomen:  abdomen soft        Vascular: pulses full and equal                                 Left AKA    Extremities and Back:           Neurological:  no gross motor deficits;affect appropriate             Medications:     Current Outpatient Medications   Medication Sig Dispense Refill     acetaminophen (TYLENOL) 500 MG tablet Take 500-1,000 mg by mouth every 6 hours as needed for mild pain       Aspirin (ASPIR-81 PO) Take 81 mg by mouth daily        fexofenadine (ALLEGRA) 180 MG tablet Take 180 mg by mouth daily       furosemide (LASIX) 20 MG tablet Take 2 tablets (40 mg) by mouth daily 180 tablet 3     insulin aspart (NOVOLOG PEN) 100 UNIT/ML pen Inject 8 Units Subcutaneous 3 times daily (with meals)        insulin glargine (BASAGLAR KWIKPEN) 100 UNIT/ML pen Inject 12 Units Subcutaneous At Bedtime        insulin pen needle (30G X 8 MM) 30G X 8 MM miscellaneous Use 5 pen needles daily or as directed.       lisinopril (ZESTRIL) 40 MG tablet Take 0.5 tablets (20 mg) by mouth daily 50 tablet 4     metFORMIN (GLUCOPHAGE-XR) 500 MG 24 hr " tablet Take 500 mg by mouth daily (with dinner)       nitroGLYcerin (NITROSTAT) 0.4 MG sublingual tablet Place 0.4 mg under the tongue every 5 minutes as needed for chest pain For chest pain place 1 tablet under the tongue every 5 minutes for 3 doses. If symptoms persist 5 minutes after 1st dose call 911.       sotalol (BETAPACE) 160 MG tablet Take 1 tablet (160 mg) by mouth 2 times daily 200 tablet 4     spironolactone (ALDACTONE) 25 MG tablet Take 3 tablets (75 mg) by mouth every morning 300 tablet 4       Family History   Problem Relation Age of Onset     Cancer Brother      Diabetes No family hx of      Hypertension No family hx of        Social History     Socioeconomic History     Marital status:      Spouse name: Not on file     Number of children: Not on file     Years of education: Not on file     Highest education level: Not on file   Occupational History     Not on file   Tobacco Use     Smoking status: Never Smoker     Smokeless tobacco: Never Used   Substance and Sexual Activity     Alcohol use: No     Drug use: Not on file     Sexual activity: Not on file   Other Topics Concern     Parent/sibling w/ CABG, MI or angioplasty before 65F 55M? Not Asked   Social History Narrative     Not on file     Social Determinants of Health     Financial Resource Strain: Not on file   Food Insecurity: Not on file   Transportation Needs: Not on file   Physical Activity: Not on file   Stress: Not on file   Social Connections: Not on file   Intimate Partner Violence: Not on file   Housing Stability: Not on file            Past Medical History:     Past Medical History:   Diagnosis Date     Coronary artery disease      Diabetes mellitus (H)      History of coronary artery bypass graft x 3 2008     Hyperlipidemia      Ischemic cardiomyopathy 2008     MI (myocardial infarction) (H) 11/2008    inferior     PAD (peripheral artery disease) (H) 12/31/2019    S/p angioplasty of right posterior tibial artery on 2/19/2019;  s/p angioplasty of distal posterior tibial artery to right foot 4/4/19       Ventricular tachycardia (H)               Past Surgical History:     Past Surgical History:   Procedure Laterality Date     BYPASS GRAFT ARTERY CORONARY  11/2008    3 vessel w/ LIMA to LAD, SVG to Diagl, SVG to PDA     ENDOSCOPIC RETROGRADE CHOLANGIOPANCREATOGRAM N/A 10/6/2018    Procedure: ENDOSCOPIC RETROGRADE CHOLANGIOPANCREATOGRAM;  ENDOSCOPIC ULTRASOUND, ENDOSCOPIC RETROGRADE CHOLANGIOPANCREATOGRAM (MAC);  Surgeon: Oc Yang MD;  Location:  OR     EP ICD GENERATOR REPLACEMENT DUAL N/A 11/17/2020    Procedure: EP ICD Generator Change Dual;  Surgeon: Coty Leonard MD;  Location:  HEART CARDIAC CATH LAB     ESOPHAGOSCOPY, GASTROSCOPY, DUODENOSCOPY (EGD), COMBINED N/A 10/6/2018    Procedure: COMBINED ENDOSCOPIC ULTRASOUND, ESOPHAGOSCOPY, GASTROSCOPY, DUODENOSCOPY (EGD);;  Surgeon: Oc Yang MD;  Location:  OR      LEFT HEART CATHETERIZATION  10/2008    Emergent thrombectomy and stent to distal RCA     IMPLANT IMPLANTABLE CARDIOVERTER DEFIBRILLATOR  11/2008    dual chamber ICD     LAPAROSCOPIC CHOLECYSTECTOMY N/A 10/7/2018    Procedure: LAPAROSCOPIC CHOLECYSTECTOMY;   Laparoscopic Cholecystectomy;  Surgeon: Dhiraj Matute MD;  Location:  OR              Allergies:   Patient has no known allergies.       Data:   All laboratory data reviewed:    Recent Labs   Lab Test 04/04/19  0000      HDL 22   NHDL 145   CHOL 167   TRIG 163       Lab Results   Component Value Date    WBC 6.1 11/17/2020    RBC 4.75 11/17/2020    HGB 14.3 11/17/2020    HCT 42.5 11/17/2020    MCV 90 11/17/2020    MCH 30.1 11/17/2020    MCHC 33.6 11/17/2020    RDW 13.6 11/17/2020     11/17/2020       Lab Results   Component Value Date     11/17/2020    POTASSIUM 4.1 11/17/2020    CHLORIDE 110 (H) 06/27/2022    CHLORIDE 109 11/17/2020    CO2 23 11/17/2020    ANIONGAP 8 11/17/2020     (H) 11/17/2020    BUN 18  11/17/2020    CR 0.68 11/17/2020    GFRESTIMATED >90 11/17/2020    GFRESTBLACK >90 11/17/2020    CLAIR 9.6 11/17/2020      Lab Results   Component Value Date    AST 67 (H) 10/08/2018     (H) 10/08/2018       Lab Results   Component Value Date    A1C 7.5 (H) 10/05/2018       Lab Results   Component Value Date    INR 1.18 (H) 11/13/2008    INR 1.11 11/09/2008       Thank you for allowing me to participate in the care of your patient.      Sincerely,     Ansley Godwin, [facility]  cc:   Nga Deleon MD  [address] [place] [id number]

## 2022-07-01 NOTE — TELEPHONE ENCOUNTER
Message from MARK Katiuska Godwin:  From: Ansley Godwin, DIMITRI   Sent: 7/1/2022   8:40 AM CDT   To: [facility] Team 1   Subject: BMP order                                         Can you please fax [facility] Thank you!     -Ansley       Faxed order for bmp between 7/8/2022 and 7/14/2022 to the lab of Dr. Edwina Rodriguez, [facility], [phone].

## 2022-07-01 NOTE — PROGRESS NOTES
Cardiology Clinic Progress Note  Go Saavedra MRN# [id number]   YOB: 1950 Age: 72 year old     Primary cardiologist: Dr. Deleon     Reason for visit: worsening edema     History of presenting illness:    Go Saavedra is a pleasant 72 year old patient with past medical history significant for coronary artery disease status post CABG x3 in 2008, mild ischemic cardiomyopathy, hyperlipidemia, hypertension, ventricular tachycardia status post ICD, type 2 diabetes complicated by peripheral neuropathy and peripheral vascular disease status post recent left AKA on 4/12/2022 who is here today with concerns of worsening edema of right lower extremity.    He was last seen by Dr. Deleon on 5/18/2022 at which time he was doing well from a cardiovascular standpoint.  He had no concerning signs of ischemia or heart failure.  Blood pressure was well controlled.  Plan was to follow-up in 6 months with repeat transthoracic echocardiogram at that time.    He had a device check in March that showed several episodes of nonsustained ventricular tachycardia that required ATP.  He was seen by Dr. Leonard and advised to stop metoprolol and start sotalol 160 mg twice daily.    More recently, he reports worsening edema in his right lower extremity.  He has no symptoms of shortness of breath, PND, or orthopnea.  He has no concerning signs of angina.  His blood pressure is slightly elevated today at 140/82.    I reviewed his most recent echocardiogram from 2018 that shows mildly reduced LV function with an EF of 50%.  There is moderate concentric LVH, normal RV size and function, and no valvular pathology.    His BMP from 6/27/2022 shows creatinine of 0.75, GFR greater than 60, BUN 19, and normal electrolytes.         Assessment and Plan:     ASSESSMENT:    1. CAD s/p 3v CABG (LIMA-LAD, SVG-Diag, SVG-PDA).     2. Mild ischemic cardiomyopathy with LVEF 35-50%. Most recent TTE from 2018 with LVEF 50%.  On Lasix 20 mg  daily.  3. NSVT s/p ICD. On sotalol 160 mg BID.   4. HLD. Not currently on lipid lowering agents, intolerant to statins.   5. HTN.  Slightly hypertensive today although historically well controlled.  6. Type II diabetes. Insulin dependent.   7. Peripheral vascular disease . S/p AKA in 4/2022.  8. Right lower extremity edema. Worsening 2+ edema over the past couple weeks.       PLAN:     1. Increase lasix to 40 mg daily.   2. Obtain echocardiogram.   3. Repeat BMP in 1 week.   4. Plan to follow-up with Dr. Deleon in 6 months, as previously arranged, sooner if needed.          Ansley Godwin, DNP, APRN, CNP  Page: [phone] (8a-5p M-F)    Orders this Visit:  Orders Placed This Encounter   Procedures     Basic metabolic panel     Echocardiogram Complete     Orders Placed This Encounter   Medications     furosemide (LASIX) 20 MG tablet     Sig: Take 2 tablets (40 mg) by mouth daily     Dispense:  180 tablet     Refill:  3     Medications Discontinued During This Encounter   Medication Reason     pregabalin (LYRICA) 75 MG capsule Stopped by Patient     furosemide (LASIX) 20 MG tablet        Today's clinic visit entailed:  Review of the result(s) of each unique test - echo, BMP  Ordering of each unique test  Prescription drug management  45 minutes spent on the date of the encounter doing chart review, review of test results, interpretation of tests, patient visit and documentation   Provider  Link to [facility] Help Grid     The level of medical decision making during this visit was of moderate complexity.           Review of Systems:     Review of Systems:  Skin:  not assessed     Eyes:  not assessed    ENT:  not assessed    Respiratory:  Negative    Cardiovascular:    edema;Positive for;lightheadedness  Gastroenterology: not assessed    Genitourinary:  not assessed    Musculoskeletal:  not assessed    Neurologic:  not assessed    Psychiatric:  not assessed    Heme/Lymph/Imm:  not assessed    Endocrine:  Positive for  "diabetes            Physical Exam:   Vitals: BP (!) 140/82   Pulse 59   Ht 1.727 m (5' 8\")   Wt 88.9 kg (196 lb)   SpO2 97%   BMI 29.80 kg/m    Constitutional:  cooperative, alert and oriented, well developed, well nourished, in no acute distress        Skin:  warm and dry to the touch        Head:  normocephalic        Eyes:  pupils equal and round        ENT:           Neck:  JVP normal        Chest:  normal breath sounds, clear to auscultation, normal A-P diameter, normal symmetry, normal respiratory excursion, no use of accessory muscles        Cardiac: regular rhythm;normal S1 and S2;no murmurs, gallops or rubs detected                  Abdomen:  abdomen soft        Vascular: pulses full and equal                                 Left AKA    Extremities and Back:           Neurological:  no gross motor deficits;affect appropriate             Medications:     Current Outpatient Medications   Medication Sig Dispense Refill     acetaminophen (TYLENOL) 500 MG tablet Take 500-1,000 mg by mouth every 6 hours as needed for mild pain       Aspirin (ASPIR-81 PO) Take 81 mg by mouth daily        fexofenadine (ALLEGRA) 180 MG tablet Take 180 mg by mouth daily       furosemide (LASIX) 20 MG tablet Take 2 tablets (40 mg) by mouth daily 180 tablet 3     insulin aspart (NOVOLOG PEN) 100 UNIT/ML pen Inject 8 Units Subcutaneous 3 times daily (with meals)        insulin glargine (BASAGLAR KWIKPEN) 100 UNIT/ML pen Inject 12 Units Subcutaneous At Bedtime        insulin pen needle (30G X 8 MM) 30G X 8 MM miscellaneous Use 5 pen needles daily or as directed.       lisinopril (ZESTRIL) 40 MG tablet Take 0.5 tablets (20 mg) by mouth daily 50 tablet 4     metFORMIN (GLUCOPHAGE-XR) 500 MG 24 hr tablet Take 500 mg by mouth daily (with dinner)       nitroGLYcerin (NITROSTAT) 0.4 MG sublingual tablet Place 0.4 mg under the tongue every 5 minutes as needed for chest pain For chest pain place 1 tablet under the tongue every 5 minutes for " 3 doses. If symptoms persist 5 minutes after 1st dose call 911.       sotalol (BETAPACE) 160 MG tablet Take 1 tablet (160 mg) by mouth 2 times daily 200 tablet 4     spironolactone (ALDACTONE) 25 MG tablet Take 3 tablets (75 mg) by mouth every morning 300 tablet 4       Family History   Problem Relation Age of Onset     Cancer Brother      Diabetes No family hx of      Hypertension No family hx of        Social History     Socioeconomic History     Marital status:      Spouse name: Not on file     Number of children: Not on file     Years of education: Not on file     Highest education level: Not on file   Occupational History     Not on file   Tobacco Use     Smoking status: Never Smoker     Smokeless tobacco: Never Used   Substance and Sexual Activity     Alcohol use: No     Drug use: Not on file     Sexual activity: Not on file   Other Topics Concern     Parent/sibling w/ CABG, MI or angioplasty before 65F 55M? Not Asked   Social History Narrative     Not on file     Social Determinants of Health     Financial Resource Strain: Not on file   Food Insecurity: Not on file   Transportation Needs: Not on file   Physical Activity: Not on file   Stress: Not on file   Social Connections: Not on file   Intimate Partner Violence: Not on file   Housing Stability: Not on file            Past Medical History:     Past Medical History:   Diagnosis Date     Coronary artery disease      Diabetes mellitus (H)      History of coronary artery bypass graft x 3 2008     Hyperlipidemia      Ischemic cardiomyopathy 2008     MI (myocardial infarction) (H) 11/2008    inferior     PAD (peripheral artery disease) (H) 12/31/2019    S/p angioplasty of right posterior tibial artery on 2/19/2019; s/p angioplasty of distal posterior tibial artery to right foot 4/4/19       Ventricular tachycardia (H)               Past Surgical History:     Past Surgical History:   Procedure Laterality Date     BYPASS GRAFT ARTERY CORONARY  11/2008    3  vessel w/ LIMA to LAD, SVG to Diagl, SVG to PDA     ENDOSCOPIC RETROGRADE CHOLANGIOPANCREATOGRAM N/A 10/6/2018    Procedure: ENDOSCOPIC RETROGRADE CHOLANGIOPANCREATOGRAM;  ENDOSCOPIC ULTRASOUND, ENDOSCOPIC RETROGRADE CHOLANGIOPANCREATOGRAM (MAC);  Surgeon: Oc Yang MD;  Location:  OR     EP ICD GENERATOR REPLACEMENT DUAL N/A 11/17/2020    Procedure: EP ICD Generator Change Dual;  Surgeon: Coty Leonard MD;  Location:  HEART CARDIAC CATH LAB     ESOPHAGOSCOPY, GASTROSCOPY, DUODENOSCOPY (EGD), COMBINED N/A 10/6/2018    Procedure: COMBINED ENDOSCOPIC ULTRASOUND, ESOPHAGOSCOPY, GASTROSCOPY, DUODENOSCOPY (EGD);;  Surgeon: Oc Yang MD;  Location:  OR      LEFT HEART CATHETERIZATION  10/2008    Emergent thrombectomy and stent to distal RCA     IMPLANT IMPLANTABLE CARDIOVERTER DEFIBRILLATOR  11/2008    dual chamber ICD     LAPAROSCOPIC CHOLECYSTECTOMY N/A 10/7/2018    Procedure: LAPAROSCOPIC CHOLECYSTECTOMY;   Laparoscopic Cholecystectomy;  Surgeon: Dhiraj Matute MD;  Location:  OR              Allergies:   Patient has no known allergies.       Data:   All laboratory data reviewed:    Recent Labs   Lab Test 04/04/19  0000      HDL 22   NHDL 145   CHOL 167   TRIG 163       Lab Results   Component Value Date    WBC 6.1 11/17/2020    RBC 4.75 11/17/2020    HGB 14.3 11/17/2020    HCT 42.5 11/17/2020    MCV 90 11/17/2020    MCH 30.1 11/17/2020    MCHC 33.6 11/17/2020    RDW 13.6 11/17/2020     11/17/2020       Lab Results   Component Value Date     11/17/2020    POTASSIUM 4.1 11/17/2020    CHLORIDE 110 (H) 06/27/2022    CHLORIDE 109 11/17/2020    CO2 23 11/17/2020    ANIONGAP 8 11/17/2020     (H) 11/17/2020    BUN 18 11/17/2020    CR 0.68 11/17/2020    GFRESTIMATED >90 11/17/2020    GFRESTBLACK >90 11/17/2020    CLAIR 9.6 11/17/2020      Lab Results   Component Value Date    AST 67 (H) 10/08/2018     (H) 10/08/2018       Lab Results   Component Value  Date    A1C 7.5 (H) 10/05/2018       Lab Results   Component Value Date    INR 1.18 (H) 11/13/2008    INR 1.11 11/09/2008

## 2022-07-18 ENCOUNTER — TELEPHONE (OUTPATIENT)
Dept: CARDIOLOGY | Facility: CLINIC | Age: 72
End: 2022-07-18

## 2022-07-18 NOTE — TELEPHONE ENCOUNTER
BMP done on 7/15/22 per MIKKI Panchal at [facility], records in Care Everywhere:         Last visit on 7/1/22 with MIKKI Panchal. Furosemide increased from 20 to 40 mg daily due to worsening right lower extremity edema. Echo is scheduled on 8/5/22. Called pt for symptom update, no answer. Left  requesting call back to Team 1.     Addendum 7/18/22 - received return call from pt. Per pt the swelling in his RLE from his foot to his ankle is improving since increasing the dose of furosemide. Pt reported that he saw his wound nurse on 7/15/22 who also recommended using a light compression wrap which seems to be helping. Denied any shortness of breath and pt stated he does not weigh himself. Will send update to MIKKI Panchal.

## 2022-07-19 NOTE — TELEPHONE ENCOUNTER
Received response from MIKKI Panchal:     Ansley Godwin, Garima Matthews, RN  Caller: Unspecified (Yesterday,  2:56 PM)  Thank you! Will keep him on the 40 mg of lasix.     Called pt, no answer. Left detailed VM with recommendation to continue furosemide 40 mg daily, left call back number for Team 1.     Addendum 7/19/22 - received VM from pt confirming he received message to continue furosemide 40 mg daily.

## 2022-07-28 NOTE — LETTER
11/13/2020    Edwina Rodriguez MD  [facility] [address] [place] [address]    RE: Go Saavedra       Dear Colleague,    I had the pleasure of seeing Go Saavedra in the [facility].    HPI and Plan:   See dictation    No orders of the defined types were placed in this encounter.      Orders Placed This Encounter   Medications     pregabalin (LYRICA) 75 MG capsule     Sig: Take 75 mg by mouth 3 times daily     DISCONTD: clopidogrel (PLAVIX) 75 MG tablet     Sig: Take 75 mg by mouth daily       Medications Discontinued During This Encounter   Medication Reason     ondansetron (ZOFRAN ODT) 4 MG ODT tab      clopidogrel (PLAVIX) 75 MG tablet          Encounter Diagnoses   Name Primary?     ICD (implantable cardioverter-defibrillator) in place      Encounter for servicing of implantable cardioverter-defibrillator (ICD) at end of battery life      Ischemic cardiomyopathy        CURRENT MEDICATIONS:  Current Outpatient Medications   Medication Sig Dispense Refill     Aspirin (ASPIR-81 PO) Take 81 mg by mouth daily        atorvastatin (LIPITOR) 80 MG tablet Take 80 mg by mouth daily       Gabapentin (NEURONTIN PO) Take 600 mg by mouth every morning       Gabapentin (NEURONTIN PO) Take 900 mg by mouth daily        insulin aspart (NOVOLOG FLEXPEN) 100 UNIT/ML injection Inject 26 Units Subcutaneous 2 times daily (with meals)        insulin glargine (BASAGLAR KWIKPEN) 100 UNIT/ML pen Inject Subcutaneous daily       insulin glargine (LANTUS SOLOSTAR) 100 UNIT/ML pen Inject 30 Units Subcutaneous every morning       insulin pen needle (NOVOFINE) 30G X 8 MM Use 5 pen needles daily or as directed.       lisinopril (PRINIVIL/ZESTRIL) 40 MG tablet Take 40 mg by mouth daily       metFORMIN (GLUCOPHAGE-XR) 500 MG 24 hr tablet Take 500 mg by mouth daily (with dinner)       metoprolol (TOPROL-XL) 100 MG 24 hr tablet Take 200 mg by mouth daily       pregabalin (LYRICA)  Patient was given day by day dose of Warfarin and redraw lab date, verbalized understanding. Patient verified correct Warfarin dose and redraw date. No other changes noted.      75 MG capsule Take 75 mg by mouth 3 times daily       spironolactone (ALDACTONE) 25 MG tablet Take 75 mg by mouth daily        nitroGLYcerin (NITROSTAT) 0.4 MG sublingual tablet Place 0.4 mg under the tongue every 5 minutes as needed for chest pain For chest pain place 1 tablet under the tongue every 5 minutes for 3 doses. If symptoms persist 5 minutes after 1st dose call 911.         ALLERGIES   No Known Allergies    PAST MEDICAL HISTORY:  Past Medical History:   Diagnosis Date     Coronary artery disease      Diabetes mellitus (H)      History of coronary artery bypass graft x 3 2008     Hyperlipidemia      Ischemic cardiomyopathy 2008     MI (myocardial infarction) (H) 11/2008    inferior     PAD (peripheral artery disease) (H) 12/31/2019    S/p angioplasty of right posterior tibial artery on 2/19/2019; s/p angioplasty of distal posterior tibial artery to right foot 4/4/19       Ventricular tachycardia (H)        PAST SURGICAL HISTORY:  Past Surgical History:   Procedure Laterality Date     BYPASS GRAFT ARTERY CORONARY  11/2008    3 vessel w/ LIMA to LAD, SVG to Diagl, SVG to PDA     ENDOSCOPIC RETROGRADE CHOLANGIOPANCREATOGRAM N/A 10/6/2018    Procedure: ENDOSCOPIC RETROGRADE CHOLANGIOPANCREATOGRAM;  ENDOSCOPIC ULTRASOUND, ENDOSCOPIC RETROGRADE CHOLANGIOPANCREATOGRAM (MAC);  Surgeon: Oc Yang MD;  Location:  OR     ESOPHAGOSCOPY, GASTROSCOPY, DUODENOSCOPY (EGD), COMBINED N/A 10/6/2018    Procedure: COMBINED ENDOSCOPIC ULTRASOUND, ESOPHAGOSCOPY, GASTROSCOPY, DUODENOSCOPY (EGD);;  Surgeon: Oc Yang MD;  Location:  OR      LEFT HEART CATHETERIZATION  10/2008    Emergent thrombectomy and stent to distal RCA     IMPLANT IMPLANTABLE CARDIOVERTER DEFIBRILLATOR  11/2008    dual chamber ICD     LAPAROSCOPIC CHOLECYSTECTOMY N/A 10/7/2018    Procedure: LAPAROSCOPIC CHOLECYSTECTOMY;   Laparoscopic Cholecystectomy;  Surgeon: Dhiraj Matute MD;  Location:  OR       [facility]  HISTORY:  Family History   Problem Relation Age of Onset     Cancer Brother      Diabetes No family hx of      Hypertension No family hx of        SOCIAL HISTORY:  Social History     Socioeconomic History     Marital status:      Spouse name: None     Number of children: None     Years of education: None     Highest education level: None   Occupational History     None   Social Needs     Financial resource strain: None     Food insecurity     Worry: None     Inability: None     Transportation needs     Medical: None     Non-medical: None   Tobacco Use     Smoking status: Never Smoker     Smokeless tobacco: Never Used   Substance and Sexual Activity     Alcohol use: No     Drug use: None     Sexual activity: None   Lifestyle     Physical activity     Days per week: None     Minutes per session: None     Stress: None   Relationships     Social connections     Talks on phone: None     Gets together: None     Attends Jew service: None     Active member of club or organization: None     Attends meetings of clubs or organizations: None     Relationship status: None     Intimate partner violence     Fear of current or ex partner: None     Emotionally abused: None     Physically abused: None     Forced sexual activity: None   Other Topics Concern     Parent/sibling w/ CABG, MI or angioplasty before 65F 55M? Not Asked   Social History Narrative     None       Review of Systems:  Skin:  Negative       Eyes:  Positive for glasses    ENT:  Negative      Respiratory:  Negative       Cardiovascular:  Negative      Gastroenterology: Negative      Genitourinary:  Negative      Musculoskeletal:  Positive for foot pain(rt foot, had some toes removed )    Neurologic:  Positive for numbness or tingling of feet(neuropathy)    Psychiatric:  Negative      Heme/Lymph/Imm:  Negative      Endocrine:  Positive for diabetes      Physical Exam:  Vitals: BP (!) 143/76   Pulse 73   Wt 97.5 kg (215 lb)   BMI 32.22 kg/m       Constitutional:           Skin:             Head:           Eyes:           Lymph:      ENT:           Neck:           Respiratory:            Cardiac:                                                           GI:           Extremities and Muscular Skeletal:                 Neurological:           Psych:           CC  No referring provider defined for this encounter.                  Thank you for allowing me to participate in the care of your patient.      Sincerely,     Coty Leonard MD     [facility]    cc:   No referring provider defined for this encounter.

## 2022-08-05 ENCOUNTER — HOSPITAL ENCOUNTER (OUTPATIENT)
Dept: CARDIOLOGY | Facility: CLINIC | Age: 72
Discharge: HOME | End: 2022-08-05
Attending: NURSE PRACTITIONER | Admitting: NURSE PRACTITIONER
Payer: COMMERCIAL

## 2022-08-05 DIAGNOSIS — I25.5 ISCHEMIC CARDIOMYOPATHY: ICD-10-CM

## 2022-08-05 LAB — LVEF ECHO: NORMAL

## 2022-08-05 PROCEDURE — 999N000208 ECHOCARDIOGRAM COMPLETE

## 2022-08-05 PROCEDURE — 255N000002 HC RX 255 OP 636: Performed by: NURSE PRACTITIONER

## 2022-08-05 PROCEDURE — 93306 TTE W/DOPPLER COMPLETE: CPT | Mod: 26 | Performed by: INTERNAL MEDICINE

## 2022-08-05 RX ADMIN — HUMAN ALBUMIN MICROSPHERES AND PERFLUTREN 9 ML: 10; .22 INJECTION, SOLUTION INTRAVENOUS at 14:40

## 2022-09-19 ENCOUNTER — ANCILLARY PROCEDURE (OUTPATIENT)
Dept: CARDIOLOGY | Facility: CLINIC | Age: 72
End: 2022-09-19
Attending: INTERNAL MEDICINE
Payer: COMMERCIAL

## 2022-09-19 DIAGNOSIS — Z95.810 ICD (IMPLANTABLE CARDIOVERTER-DEFIBRILLATOR) IN PLACE: ICD-10-CM

## 2022-09-19 DIAGNOSIS — I25.5 ISCHEMIC CARDIOMYOPATHY: ICD-10-CM

## 2022-09-19 PROCEDURE — 93296 REM INTERROG EVL PM/IDS: CPT | Performed by: INTERNAL MEDICINE

## 2022-09-19 PROCEDURE — 93295 DEV INTERROG REMOTE 1/2/MLT: CPT | Performed by: INTERNAL MEDICINE

## 2022-09-22 LAB
MDC_IDC_EPISODE_DTM: NORMAL
MDC_IDC_EPISODE_DTM: NORMAL
MDC_IDC_EPISODE_ID: NORMAL
MDC_IDC_EPISODE_ID: NORMAL
MDC_IDC_EPISODE_TYPE: NORMAL
MDC_IDC_EPISODE_TYPE: NORMAL
MDC_IDC_LEAD_IMPLANT_DT: NORMAL
MDC_IDC_LEAD_LOCATION: NORMAL
MDC_IDC_LEAD_LOCATION_DETAIL_1: NORMAL
MDC_IDC_LEAD_MFG: NORMAL
MDC_IDC_LEAD_MODEL: NORMAL
MDC_IDC_LEAD_POLARITY_TYPE: NORMAL
MDC_IDC_LEAD_SERIAL: NORMAL
MDC_IDC_MSMT_BATTERY_DTM: NORMAL
MDC_IDC_MSMT_BATTERY_REMAINING_LONGEVITY: 174 MO
MDC_IDC_MSMT_BATTERY_REMAINING_PERCENTAGE: 100 %
MDC_IDC_MSMT_BATTERY_STATUS: NORMAL
MDC_IDC_MSMT_CAP_CHARGE_DTM: NORMAL
MDC_IDC_MSMT_CAP_CHARGE_TIME: 9.6 S
MDC_IDC_MSMT_CAP_CHARGE_TYPE: NORMAL
MDC_IDC_MSMT_LEADCHNL_RV_IMPEDANCE_VALUE: 369 OHM
MDC_IDC_MSMT_LEADCHNL_RV_PACING_THRESHOLD_AMPLITUDE: 1.4 V
MDC_IDC_MSMT_LEADCHNL_RV_PACING_THRESHOLD_PULSEWIDTH: 0.4 MS
MDC_IDC_PG_IMPLANT_DTM: NORMAL
MDC_IDC_PG_MFG: NORMAL
MDC_IDC_PG_MODEL: NORMAL
MDC_IDC_PG_SERIAL: NORMAL
MDC_IDC_PG_TYPE: NORMAL
MDC_IDC_SESS_CLINIC_NAME: NORMAL
MDC_IDC_SESS_DTM: NORMAL
MDC_IDC_SESS_TYPE: NORMAL
MDC_IDC_SET_BRADY_LOWRATE: 40 {BEATS}/MIN
MDC_IDC_SET_BRADY_MODE: NORMAL
MDC_IDC_SET_LEADCHNL_RV_PACING_AMPLITUDE: 3.2 V
MDC_IDC_SET_LEADCHNL_RV_PACING_CAPTURE_MODE: NORMAL
MDC_IDC_SET_LEADCHNL_RV_PACING_POLARITY: NORMAL
MDC_IDC_SET_LEADCHNL_RV_PACING_PULSEWIDTH: 0.4 MS
MDC_IDC_SET_LEADCHNL_RV_SENSING_ADAPTATION_MODE: NORMAL
MDC_IDC_SET_LEADCHNL_RV_SENSING_POLARITY: NORMAL
MDC_IDC_SET_LEADCHNL_RV_SENSING_SENSITIVITY: 0.6 MV
MDC_IDC_SET_ZONE_DETECTION_INTERVAL: 300 MS
MDC_IDC_SET_ZONE_DETECTION_INTERVAL: 353 MS
MDC_IDC_SET_ZONE_DETECTION_INTERVAL: 400 MS
MDC_IDC_SET_ZONE_TYPE: NORMAL
MDC_IDC_SET_ZONE_VENDOR_TYPE: NORMAL
MDC_IDC_STAT_BRADY_DTM_END: NORMAL
MDC_IDC_STAT_BRADY_DTM_START: NORMAL
MDC_IDC_STAT_BRADY_RV_PERCENT_PACED: 0 %
MDC_IDC_STAT_EPISODE_RECENT_COUNT: 0
MDC_IDC_STAT_EPISODE_RECENT_COUNT: 0
MDC_IDC_STAT_EPISODE_RECENT_COUNT: 3
MDC_IDC_STAT_EPISODE_RECENT_COUNT: 37
MDC_IDC_STAT_EPISODE_RECENT_COUNT: 4
MDC_IDC_STAT_EPISODE_RECENT_COUNT_DTM_END: NORMAL
MDC_IDC_STAT_EPISODE_RECENT_COUNT_DTM_START: NORMAL
MDC_IDC_STAT_EPISODE_TYPE: NORMAL
MDC_IDC_STAT_EPISODE_VENDOR_TYPE: NORMAL
MDC_IDC_STAT_TACHYTHERAPY_ATP_DELIVERED_RECENT: 37
MDC_IDC_STAT_TACHYTHERAPY_ATP_DELIVERED_TOTAL: 37
MDC_IDC_STAT_TACHYTHERAPY_RECENT_DTM_END: NORMAL
MDC_IDC_STAT_TACHYTHERAPY_RECENT_DTM_START: NORMAL
MDC_IDC_STAT_TACHYTHERAPY_SHOCKS_ABORTED_RECENT: 37
MDC_IDC_STAT_TACHYTHERAPY_SHOCKS_ABORTED_TOTAL: 37
MDC_IDC_STAT_TACHYTHERAPY_SHOCKS_DELIVERED_RECENT: 0
MDC_IDC_STAT_TACHYTHERAPY_SHOCKS_DELIVERED_TOTAL: 0
MDC_IDC_STAT_TACHYTHERAPY_TOTAL_DTM_END: NORMAL
MDC_IDC_STAT_TACHYTHERAPY_TOTAL_DTM_START: NORMAL

## 2022-10-18 PROBLEM — I10 BENIGN ESSENTIAL HYPERTENSION: Status: ACTIVE | Noted: 2022-10-18

## 2022-10-26 ENCOUNTER — TELEPHONE (OUTPATIENT)
Dept: CARDIOLOGY | Facility: CLINIC | Age: 72
End: 2022-10-26

## 2022-10-26 NOTE — TELEPHONE ENCOUNTER
Pt had called stating that his remote monitor had a flashing light stating that he should call clinic.      Asked pt to unplug monitor and re-plug it in.      This fixed the light.  Will call clinic with further issues.

## 2023-03-01 NOTE — PROGRESS NOTES
No response received, inactivated pt from the device clinic. Machelle TONG   73-year-old female with past medical history of hypertension, DM-2, hyperlipidemia presented to the ED for being anxious and not feeling well. Patient states that she was not feeling well since this morning and she was hypertensive at home. She states that, this all started when she had an argument with the [employer]  today morning to get her over to the ophthalmology clinic for an appointment today. Her anxiety worsened when she came to know at the eye clinic that her appointment is scheduled for tomorrow and not today. She states that after all this she became very anxious, and frustrated and decided to go the hospital as she was thinking some thing is wrong with her body. Also reports active headache. She further states that this is her first visit to this hospital and she usually go a hospital in the [place] where she has established care from many years. As per patient, her Blood pressure is not well controlled despite taking multiple medications for her BP. She regularly records her BP at home and readings are frequently around 180s mmhg and they rarely reach 150mmhg which is her target. On further inquiry, she Denies ever having had chest pain, shortness of breath, vomiting, dizziness, diaphoresis, cough, fever, leg pain or swelling, recent long distance travel.   Vitals at arrival time in ED :  · Temp (F)	97.9 · Temp (C) Temp (C)	36.6 · Temp site Temp Site	oral · Heart Rate Heart Rate (beats/min)	90 · BP Systolic Systolic	 213 · BP Diastolic Diastolic (mm Hg)	92 · Respiration Rate (breaths/min) Respiration Rate (breaths/min)	19 · SpO2 (%) SpO2 (%)	98    Labs: no significant findings  EKG: showed TWI in lateral leads. CODE STEMI Called.  Case was discussed with interventional cardiologist on call and code STEMI was cancelled.   Patient admitted for hypertensive Emergency        73-year-old female with past medical history of hypertension, DM-2, hyperlipidemia presented to the ED for being anxious and not feeling well. Patient states that she was not feeling well since this morning and she was hypertensive at home. She states that, this all started when she had an argument with the [employer]  today morning to get her over to the ophthalmology clinic for an appointment today. Her anxiety worsened when she came to know at the eye clinic that her appointment is scheduled for tomorrow and not today. She states that after all this she became very anxious, and frustrated and decided to go the hospital as she was thinking some thing is wrong with her body. Also reports active headache. She further states that this is her first visit to this hospital and she usually go a hospital in the [place] where she has established care from many years. As per patient, her Blood pressure is not well controlled despite taking multiple medications for her BP. She regularly records her BP at home and readings are frequently around 180s mmhg and they rarely reach 150mmhg which is her target. On further inquiry, she Denies ever having had chest pain, shortness of breath, vomiting, dizziness, diaphoresis, cough, fever, leg pain or swelling, recent long distance travel.   Vitals at arrival time in ED :  · Temp (F)	97.9 · Temp (C) Temp (C)	36.6 · Temp site Temp Site	oral · Heart Rate Heart Rate (beats/min)	90 · BP Systolic Systolic	 213 · BP Diastolic Diastolic (mm Hg)	92 · Respiration Rate (breaths/min) Respiration Rate (breaths/min)	19 · SpO2 (%) SpO2 (%)	98    Labs: no significant findings  EKG: showed TWI in lateral leads. CODE STEMI Called.  Case was discussed with interventional cardiologist on call and code STEMI was cancelled.   Patient admitted for hypertensive Emergency        73-year-old female with past medical history of hypertension, DM-2, hyperlipidemia presented to the ED for being anxious and not feeling well. Patient states that she was not feeling well since this morning and she was hypertensive at home. She states that, this all started when she had an argument with the [employer]  today morning to get her over to the ophthalmology clinic for an appointment today. Her anxiety worsened when she came to know at the eye clinic that her appointment is scheduled for tomorrow and not today. She states that after all this she became very anxious, and frustrated and decided to go the hospital as she was thinking some thing is wrong with her body. Also reports active headache. She further states that this is her first visit to this hospital and she usually go a hospital in the [place] where she has established care from many years. As per patient, her Blood pressure is not well controlled despite taking multiple medications for her BP. She regularly records her BP at home and readings are frequently around 180s mmhg and they rarely reach 150mmhg which is her target. As per patient, she is compliant with her medications and diet and regularly follows a dietician.  On further inquiry, she Denies ever having had chest pain, shortness of breath, vomiting, dizziness, diaphoresis, cough, fever, leg pain or swelling, recent long distance travel.   Vitals at arrival time in ED :  · Temp (F)	97.9 · Temp (C) Temp (C)	36.6 · Temp site Temp Site	oral · Heart Rate Heart Rate (beats/min)	90 · BP Systolic Systolic	 213 · BP Diastolic Diastolic (mm Hg)	92 · Respiration Rate (breaths/min) Respiration Rate (breaths/min)	19 · SpO2 (%) SpO2 (%)	98    Labs: no significant findings  EKG: showed TWI in lateral leads. CODE STEMI Called.  Case was discussed with interventional cardiologist on call and code STEMI was cancelled.   Patient admitted for hypertensive Emergency

## 2023-11-05 ENCOUNTER — ANCILLARY PROCEDURE (OUTPATIENT)
Dept: CARDIOLOGY | Facility: CLINIC | Age: 73
End: 2023-11-05
Attending: INTERNAL MEDICINE
Payer: COMMERCIAL

## 2023-11-05 DIAGNOSIS — Z95.810 ICD (IMPLANTABLE CARDIOVERTER-DEFIBRILLATOR) IN PLACE: ICD-10-CM

## 2023-11-05 DIAGNOSIS — R60.0 LOWER EXTREMITY EDEMA: ICD-10-CM

## 2023-11-05 DIAGNOSIS — I50.41 ACUTE COMBINED SYSTOLIC (CONGESTIVE) AND DIASTOLIC (CONGESTIVE) HEART FAILURE (H): ICD-10-CM

## 2023-11-05 DIAGNOSIS — I47.20 VENTRICULAR TACHYCARDIA (H): ICD-10-CM

## 2023-11-05 DIAGNOSIS — Z79.899 ENCOUNTER FOR MONITORING SOTALOL THERAPY: ICD-10-CM

## 2023-11-05 DIAGNOSIS — I25.10 CORONARY ARTERY DISEASE INVOLVING NATIVE CORONARY ARTERY OF NATIVE HEART WITHOUT ANGINA PECTORIS: ICD-10-CM

## 2023-11-05 DIAGNOSIS — I25.5 ISCHEMIC CARDIOMYOPATHY: ICD-10-CM

## 2023-11-05 DIAGNOSIS — Z51.81 ENCOUNTER FOR MONITORING SOTALOL THERAPY: ICD-10-CM

## 2023-11-05 PROCEDURE — 93295 DEV INTERROG REMOTE 1/2/MLT: CPT | Performed by: INTERNAL MEDICINE

## 2023-11-05 PROCEDURE — 93296 REM INTERROG EVL PM/IDS: CPT | Performed by: INTERNAL MEDICINE

## 2023-11-06 ENCOUNTER — TELEPHONE (OUTPATIENT)
Dept: CARDIOLOGY | Facility: CLINIC | Age: 73
End: 2023-11-06
Payer: COMMERCIAL

## 2023-11-06 DIAGNOSIS — I50.41 ACUTE COMBINED SYSTOLIC (CONGESTIVE) AND DIASTOLIC (CONGESTIVE) HEART FAILURE (H): ICD-10-CM

## 2023-11-06 DIAGNOSIS — R60.0 LOWER EXTREMITY EDEMA: ICD-10-CM

## 2023-11-06 DIAGNOSIS — I47.20 VENTRICULAR TACHYCARDIA (H): ICD-10-CM

## 2023-11-06 DIAGNOSIS — I25.5 ISCHEMIC CARDIOMYOPATHY: ICD-10-CM

## 2023-11-06 DIAGNOSIS — I25.10 CORONARY ARTERY DISEASE INVOLVING NATIVE CORONARY ARTERY OF NATIVE HEART WITHOUT ANGINA PECTORIS: ICD-10-CM

## 2023-11-06 DIAGNOSIS — Z51.81 ENCOUNTER FOR MONITORING SOTALOL THERAPY: ICD-10-CM

## 2023-11-06 DIAGNOSIS — Z79.899 ENCOUNTER FOR MONITORING SOTALOL THERAPY: ICD-10-CM

## 2023-11-06 DIAGNOSIS — Z95.810 ICD (IMPLANTABLE CARDIOVERTER-DEFIBRILLATOR) IN PLACE: Primary | ICD-10-CM

## 2023-11-06 NOTE — TELEPHONE ENCOUNTER
"Alert received from patient's ICD for, \"Antitachycardia pacing (ATP) therapy delivered to convert arrhythmia\"    Patient has been lost to follow up for >1 year, device was last checked on 9/19/22. This transmission was utilized as a full check... results included below including VT episode that required ATP to terminate.  Patient takes 160mg sotalol BID. EF 35-40% 8/2022.    [employer] Momentum (S) ICD Remote Device Check    : 0%    Mode: VVI 40    Presenting Rhythm: VS 60-70s    Heart Rate: excellent variability, mostly from 50-100s per histogram    Leads: stable    Battery Status: estimated 13.5 years remaining until RRT    Ventricular Arrhythmia: 52 HVR episodes logged since device last checked on 9/19/22. One episode logged 11/4/23 requiring ATPx1 to terminate (refer to ATP section below).   4 EGMs for review show clear change in V morphology for NSVT lasting 5-12 beats w/ rates in the 200-220s.  8 EGMs for review no change in morphology for likely ST vs SVT lasting 13-55s (per logbook.. no onset/offset available) w/ rates in the 150s.   ATP: 1 episode logged 11/4/23 at 1215. EGM shows VT lasting 3 seconds before ATPx1 delivered and successful at terminating rhythm.   Shocks: 0      Tachy Therapy History:   - Resonate (11/2020 - present): 38 ATP (w/ 38 aborted shocks)       - 11/4/23: ATPx1 terminated 3s VT episode in the 220s       - 3/11 to 4/23/22: 37 VT episodes requiring ATP to terminate (shocks aborted)... metoprolol changed to sotalol  - Teligen (11/2008 - 11/2020): 7 ATP, 7 shocks, 1 diverted shock       - 12/21/18: ATP x1 (and aborted shock) for VT episode       - 12/2011: 2 SVT episodes (in the 150s) inappropriately treated with ATP... tachy zones adjusted       - 1/9 to 1/10/09: inappropriate ATP and shocks x6 for ST... tachy zones adjusted       - 11/2008 - 2009: per EPIC, \"He received an ICD in 2008 for sustained VT. He had 1 appropriate shock for VT in that year.\" No documentation in " magda regarding this episode.      Call out to patient to assess for symptoms and med compliance. No answer, left non detailed VM (no C2C on file) requesting a call back to Device RN line. Patient is overdue for an in clinic device check as well as follow up with cardiology.     Will route update to Dr. Deleon and his team for review and recommendation.    CHYNA AREVALO

## 2023-11-06 NOTE — TELEPHONE ENCOUNTER
Pt returned call. He reports no symptoms. Pt reports taking sotalol 160 mg BID. Forwarded to scheduling to make follow up appt with Dr. Deleon and in clinic device check. Danielle/RN

## 2023-11-21 ENCOUNTER — TELEPHONE (OUTPATIENT)
Dept: CARDIOLOGY | Facility: CLINIC | Age: 73
End: 2023-11-21
Payer: COMMERCIAL

## 2023-11-21 DIAGNOSIS — Z51.81 ENCOUNTER FOR MONITORING SOTALOL THERAPY: ICD-10-CM

## 2023-11-21 DIAGNOSIS — I25.5 ISCHEMIC CARDIOMYOPATHY: ICD-10-CM

## 2023-11-21 DIAGNOSIS — Z79.899 ENCOUNTER FOR MONITORING SOTALOL THERAPY: ICD-10-CM

## 2023-11-21 DIAGNOSIS — Z95.810 ICD (IMPLANTABLE CARDIOVERTER-DEFIBRILLATOR) IN PLACE: Primary | ICD-10-CM

## 2023-11-21 DIAGNOSIS — I47.20 VENTRICULAR TACHYCARDIA (H): ICD-10-CM

## 2023-11-21 NOTE — TELEPHONE ENCOUNTER
"Alert for ATP X3 with 3 EGMs available showing VF with V-rates in the 220 bpm range with each episode having one round of successful ATP.  Episodes happening between 0800-7991    Pt takes Sotalol. Tried to call pt , but did not answer.  Left a general answer asking pt to call clinic back so we can see if he had any symptoms and if he has been compliant with medications (takes Sotalol)    Pt has an appointment with Dr. Deleon on 2/7/24. Will forward to her to see if she has any further recommendations for the pt.      Tachy Therapy History:   - Resonate (11/2020 - present): 41 ATP (w/ 38 aborted shocks)       -11/20/23: ATPx3 (3) separate VF episodes in the 220's with ATP successful each time.        - 11/4/23: ATPx1 terminated 3s VT episode in the 220s       - 3/11 to 4/23/22: 37 VT episodes requiring ATP to terminate (shocks aborted)... metoprolol changed to sotalol  - Teligen (11/2008 - 11/2020): 7 ATP, 7 shocks, 1 diverted shock       - 12/21/18: ATP x1 (and aborted shock) for VT episode       - 12/2011: 2 SVT episodes (in the 150s) inappropriately treated with ATP... tachy zones adjusted       - 1/9 to 1/10/09: inappropriate ATP and shocks x6 for ST... tachy zones adjusted       - 11/2008 - 2009: per EPIC, \"He received an ICD in 2008 for sustained VT. He had 1 appropriate shock for VT in that year.\" No documentation in paceart regarding this episode.                        "

## 2023-11-22 NOTE — TELEPHONE ENCOUNTER
Spoke with Ralph. He did not have any symptoms that coincided with episodes.      This is the fourth ATP episode patient has received this month... This is concerning as no episodes have been logged since patient's metoprolol was switched to sotalol in early 2022.   Did question patient again about medication compliance. He states one of his medications is causing stomach issues so he is in the process of trying figure out which medication that is. He states he has missed about a dozen doses of his sotalol but that he plans to restart this today. Patient is aware NOT to restart his sotalol until he receives clarification from the clinic on what dose he should restart on (MD might recommend tapering back up to his previous dose of 160mg).     CHYNA AREVALO

## 2023-11-22 NOTE — TELEPHONE ENCOUNTER
Received clarification from DAX AREVALO that patient can restart on his previous dose of sotalol as he previously tolerated it in the past.  LVM for Ralph to please call back. Patient should restart his sotalol tonight and continue to take. Will reinforce the importance of this medication and not missing any doses.  CHYNA AREVALO

## 2023-11-24 LAB
MDC_IDC_EPISODE_DTM: NORMAL
MDC_IDC_EPISODE_DURATION: 12 S
MDC_IDC_EPISODE_DURATION: 123 S
MDC_IDC_EPISODE_DURATION: 13 S
MDC_IDC_EPISODE_DURATION: 13 S
MDC_IDC_EPISODE_DURATION: 14 S
MDC_IDC_EPISODE_DURATION: 14 S
MDC_IDC_EPISODE_DURATION: 15 S
MDC_IDC_EPISODE_DURATION: 17 S
MDC_IDC_EPISODE_DURATION: 181 S
MDC_IDC_EPISODE_DURATION: 26 S
MDC_IDC_EPISODE_DURATION: 26 S
MDC_IDC_EPISODE_DURATION: 28 S
MDC_IDC_EPISODE_DURATION: 33 S
MDC_IDC_EPISODE_DURATION: 39 S
MDC_IDC_EPISODE_DURATION: 49 S
MDC_IDC_EPISODE_DURATION: 55 S
MDC_IDC_EPISODE_DURATION: 66 S
MDC_IDC_EPISODE_DURATION: 74 S
MDC_IDC_EPISODE_DURATION: 77 S
MDC_IDC_EPISODE_DURATION: 79 S
MDC_IDC_EPISODE_DURATION: 91 S
MDC_IDC_EPISODE_ID: NORMAL
MDC_IDC_EPISODE_TYPE: NORMAL
MDC_IDC_EPISODE_TYPE_INDUCED: NO
MDC_IDC_EPISODE_VENDOR_TYPE: NORMAL
MDC_IDC_LEAD_CONNECTION_STATUS: NORMAL
MDC_IDC_LEAD_IMPLANT_DT: NORMAL
MDC_IDC_LEAD_LOCATION: NORMAL
MDC_IDC_LEAD_LOCATION_DETAIL_1: NORMAL
MDC_IDC_LEAD_MFG: NORMAL
MDC_IDC_LEAD_MODEL: NORMAL
MDC_IDC_LEAD_POLARITY_TYPE: NORMAL
MDC_IDC_LEAD_SERIAL: NORMAL
MDC_IDC_MSMT_BATTERY_DTM: NORMAL
MDC_IDC_MSMT_BATTERY_REMAINING_LONGEVITY: 162 MO
MDC_IDC_MSMT_BATTERY_REMAINING_PERCENTAGE: 100 %
MDC_IDC_MSMT_BATTERY_STATUS: NORMAL
MDC_IDC_MSMT_CAP_CHARGE_DTM: NORMAL
MDC_IDC_MSMT_CAP_CHARGE_TIME: 9.9 S
MDC_IDC_MSMT_CAP_CHARGE_TYPE: NORMAL
MDC_IDC_MSMT_LEADCHNL_RV_IMPEDANCE_VALUE: 378 OHM
MDC_IDC_MSMT_LEADCHNL_RV_PACING_THRESHOLD_AMPLITUDE: 1.5 V
MDC_IDC_MSMT_LEADCHNL_RV_PACING_THRESHOLD_PULSEWIDTH: 0.4 MS
MDC_IDC_PG_IMPLANT_DTM: NORMAL
MDC_IDC_PG_MFG: NORMAL
MDC_IDC_PG_MODEL: NORMAL
MDC_IDC_PG_SERIAL: NORMAL
MDC_IDC_PG_TYPE: NORMAL
MDC_IDC_SESS_CLINIC_NAME: NORMAL
MDC_IDC_SESS_DTM: NORMAL
MDC_IDC_SESS_TYPE: NORMAL
MDC_IDC_SET_BRADY_LOWRATE: 40 {BEATS}/MIN
MDC_IDC_SET_BRADY_MODE: NORMAL
MDC_IDC_SET_LEADCHNL_RV_PACING_AMPLITUDE: 3.2 V
MDC_IDC_SET_LEADCHNL_RV_PACING_CAPTURE_MODE: NORMAL
MDC_IDC_SET_LEADCHNL_RV_PACING_POLARITY: NORMAL
MDC_IDC_SET_LEADCHNL_RV_PACING_PULSEWIDTH: 0.4 MS
MDC_IDC_SET_LEADCHNL_RV_SENSING_ADAPTATION_MODE: NORMAL
MDC_IDC_SET_LEADCHNL_RV_SENSING_POLARITY: NORMAL
MDC_IDC_SET_LEADCHNL_RV_SENSING_SENSITIVITY: 0.6 MV
MDC_IDC_SET_ZONE_DETECTION_INTERVAL: 300 MS
MDC_IDC_SET_ZONE_DETECTION_INTERVAL: 353 MS
MDC_IDC_SET_ZONE_DETECTION_INTERVAL: 400 MS
MDC_IDC_SET_ZONE_STATUS: NORMAL
MDC_IDC_SET_ZONE_TYPE: NORMAL
MDC_IDC_SET_ZONE_VENDOR_TYPE: NORMAL
MDC_IDC_STAT_BRADY_DTM_END: NORMAL
MDC_IDC_STAT_BRADY_DTM_START: NORMAL
MDC_IDC_STAT_BRADY_RV_PERCENT_PACED: 0 %
MDC_IDC_STAT_EPISODE_RECENT_COUNT: 0
MDC_IDC_STAT_EPISODE_RECENT_COUNT: 0
MDC_IDC_STAT_EPISODE_RECENT_COUNT: 5
MDC_IDC_STAT_EPISODE_RECENT_COUNT: 8
MDC_IDC_STAT_EPISODE_RECENT_COUNT_DTM_END: NORMAL
MDC_IDC_STAT_EPISODE_RECENT_COUNT_DTM_START: NORMAL
MDC_IDC_STAT_EPISODE_TYPE: NORMAL
MDC_IDC_STAT_EPISODE_VENDOR_TYPE: NORMAL
MDC_IDC_STAT_TACHYTHERAPY_ATP_DELIVERED_RECENT: 38
MDC_IDC_STAT_TACHYTHERAPY_ATP_DELIVERED_TOTAL: 38
MDC_IDC_STAT_TACHYTHERAPY_RECENT_DTM_END: NORMAL
MDC_IDC_STAT_TACHYTHERAPY_RECENT_DTM_START: NORMAL
MDC_IDC_STAT_TACHYTHERAPY_SHOCKS_ABORTED_RECENT: 38
MDC_IDC_STAT_TACHYTHERAPY_SHOCKS_ABORTED_TOTAL: 38
MDC_IDC_STAT_TACHYTHERAPY_SHOCKS_DELIVERED_RECENT: 0
MDC_IDC_STAT_TACHYTHERAPY_SHOCKS_DELIVERED_TOTAL: 0
MDC_IDC_STAT_TACHYTHERAPY_TOTAL_DTM_END: NORMAL
MDC_IDC_STAT_TACHYTHERAPY_TOTAL_DTM_START: NORMAL

## 2023-11-27 NOTE — TELEPHONE ENCOUNTER
Spoke with patient. He apologized for not calling back sooner, he had a lot going on with the holiday. He plans to restart his sotalol today. He states he has plenty and does not need a refill at this time. Knows to call the clinic with any questions or concerns.   CHYNA AREVALO

## 2023-12-28 ENCOUNTER — TELEPHONE (OUTPATIENT)
Dept: CARDIOLOGY | Facility: CLINIC | Age: 73
End: 2023-12-28
Payer: COMMERCIAL

## 2023-12-28 DIAGNOSIS — E78.5 HYPERLIPIDEMIA: ICD-10-CM

## 2023-12-28 DIAGNOSIS — I25.5 ISCHEMIC CARDIOMYOPATHY: Primary | ICD-10-CM

## 2023-12-28 DIAGNOSIS — I49.9 IRREGULAR HEART RATE: ICD-10-CM

## 2023-12-28 DIAGNOSIS — I25.10 CORONARY ARTERY DISEASE INVOLVING NATIVE CORONARY ARTERY OF NATIVE HEART WITHOUT ANGINA PECTORIS: ICD-10-CM

## 2023-12-28 NOTE — TELEPHONE ENCOUNTER
Pt returned call.   Explained below rhythms , ATP  and the potential for a shock.  Pt was not symptomatic with VT episode requiring ATP  Pt admits he only takes his Sotolol 3 out of 7 days due to upset stomach and diarrhea that it gives him. Pt has prosthetic leg and is unable to make it to the bathroom in time if he takes it. States it really affects his quality of life. He has therapy appointments and he is an active person normally but is stuck at home on the days he takes the Sotolol.  Pt upset and wondering if there is a different medication he can take.   Talked to pt about getting  in to see EP MD as soon as possible and to follow up with Dr Deleon. Pt is scheduled for an in clinic device check on Feb 7 th and is going to try and see EP at that time. Told patient I had noticed he had cancelled several appointments in the past and it is in his best interest to schedule and make it to these appointments.   In the meantime informed pt I would pass this information onto Dr Deleon and Dr Lee for review and recommendations.  Laura AREVALO

## 2023-12-28 NOTE — TELEPHONE ENCOUNTER
"Alert received    Dec 27, 2023 18:18 Antitachycardia pacing (ATP) therapy delivered to convert arrhythmia.    3 HVR events logged 12/27/2023 between 15:36 and 18:57   2 EGM's show 11 & 12 beats with morphology changes for NSVT rate 215-217  1 EGM shows 14 beats of VF then ATP delivered with successful termination of VF rate of 214.  Of note pt has had a total of 9 episodes of NSVT since last ATP on Nov 20 at which time he received 3 rounds of successful ATP.  Of note pt has not been seen by Dr Deleon since 5/18/2022 and Dr Leonard since 11/13/2020. Pt has cancelled the last 3 appointments with Dr Deleon and has not made appointment with EP as recommended on 11/20/2023, So has not established an EP MD.  Called placed to patient to review symptoms and medication compliance. VM left on home phone and Cell phone for pt to call back. Phone number provided.  Will route to Dr Deleon and Dr Lee (doc of the day)  Laura AREVALO            Tachy Therapy History:   - Resonate (11/2020 - present): 42 ATP (w/ 38 aborted shocks)        -12/28/2023 ATP x1 terminated VT episode rate 214       -11/20/23: ATPx3 (3) separate VF episodes in the 220's with ATP successful each time.        - 11/4/23: ATPx1 terminated 3s VT episode in the 220s       - 3/11 to 4/23/22: 37 VT episodes requiring ATP to terminate (shocks aborted)... metoprolol changed to sotalol  - Teligen (11/2008 - 11/2020): 7 ATP, 7 shocks, 1 diverted shock       - 12/21/18: ATP x1 (and aborted shock) for VT episode       - 12/2011: 2 SVT episodes (in the 150s) inappropriately treated with ATP... tachy zones adjusted       - 1/9 to 1/10/09: inappropriate ATP and shocks x6 for ST... tachy zones adjusted       - 11/2008 - 2009: per EPIC, \"He received an ICD in 2008 for sustained VT. He had 1 appropriate shock for VT in that year.\" No documentation in paceart regarding this episode.  "

## 2024-01-01 NOTE — TELEPHONE ENCOUNTER
Spoke with patient and spouse, they will call their PCP clinic to find a lab time to do flp prior to OV on 5/18/2022. Orders sent.     (2) more than 100 beats/min

## 2024-01-02 NOTE — TELEPHONE ENCOUNTER
Reply from Dr. Deleon:  Nga Deleon MD Friedlund, Gwen D, RN; P [facility] Heart Team 2; P /Ru [facility] Heart Scheduling  Caller: Unspecified (5 days ago,  8:44 AM)  Hello    1.  Please refer patient to establish care with new EP physician about his VT and sotalol.  2.  Please order up-to-date transthoracic echocardiogram, labs and have patient establish care with next available core clinic [facility] to uptitrate his GDMT.  3.  Please cancel visit with me in March 2024.    Orders placed for [facility] visit with echo, lipids, cmp, CBC, TSH.  Message sent to scheduling team    Left a message for the patient to expect a call from scheduling team.

## 2024-01-02 NOTE — TELEPHONE ENCOUNTER
Pt already scheduled with Dr Lee 3/5/2024 and also for in clinic device check.Cancelled appointment with Dr Deleon per her request.  Called and spoke with patient. Updated him on upcoming appointments.  Laura AREVALO

## 2024-01-25 ENCOUNTER — TELEPHONE (OUTPATIENT)
Dept: CARDIOLOGY | Facility: CLINIC | Age: 74
End: 2024-01-25
Payer: COMMERCIAL

## 2024-01-25 DIAGNOSIS — I47.20 VENTRICULAR TACHYCARDIA (H): ICD-10-CM

## 2024-01-25 DIAGNOSIS — Z95.810 ICD (IMPLANTABLE CARDIOVERTER-DEFIBRILLATOR) IN PLACE: ICD-10-CM

## 2024-01-25 DIAGNOSIS — I25.5 ISCHEMIC CARDIOMYOPATHY: Primary | ICD-10-CM

## 2024-01-25 NOTE — TELEPHONE ENCOUNTER
Alert received for ATP     EGM shows 16 beats on VF rate of 209 followed by ATP successful in converting rhythm back to VS.   Called pt who states he did feel this. Remembers felling a little light headed but this abruptly passed.     Pt is prescribed Sotolol but admits that he is NOT taking this as it causes him extreme diarrhea.   Pt is currently scheduled to see Dr Lee  and in clinic device check 3/5/2024  This is pts 6 th round of ATP since Nov 4th. Pt was switched from Metoprolol to Sotalol for Frequent runs of ATP in early 2022.  Device info:  [employer] Momentum ICD  RV lead only placed for secondary prevention CM  8/5/2022 EF 35-40 %     Will forward this to Dr Lee for review and recommendations.  Laura AREVALO

## 2024-01-26 NOTE — TELEPHONE ENCOUNTER
Arik Lee MD  You; Sanjeev Maya hours ago (8:49 AM)     QP  See me to talk about vt ablation.     Message also sent to Rashmi to schedule.   Called pt and left detailed VM for patient on recommendations by Dr Lee.  Laura AREVALO

## 2024-01-29 NOTE — TELEPHONE ENCOUNTER
Patient called back and was told he needed to be seen before 3/5 due some occurrences from his device. He was told someone would call him to schedule and he has not heard back. I was not able to access any appointments prior to 3/5. Please review and contact patient to schedule. Thanks.

## 2024-02-06 DIAGNOSIS — Z92.29 H/O AMIODARONE THERAPY: Primary | ICD-10-CM

## 2024-02-06 DIAGNOSIS — E11.59 TYPE 2 DIABETES MELLITUS WITH OTHER CIRCULATORY COMPLICATION, WITH LONG-TERM CURRENT USE OF INSULIN (H): ICD-10-CM

## 2024-02-06 DIAGNOSIS — Z79.4 TYPE 2 DIABETES MELLITUS WITH OTHER CIRCULATORY COMPLICATION, WITH LONG-TERM CURRENT USE OF INSULIN (H): ICD-10-CM

## 2024-02-06 DIAGNOSIS — Z79.899 LONG TERM CURRENT USE OF AMIODARONE: ICD-10-CM

## 2024-02-06 RX ORDER — AMIODARONE HYDROCHLORIDE 200 MG/1
200 TABLET ORAL 2 TIMES DAILY
Qty: 60 TABLET | Refills: 3 | Status: SHIPPED | OUTPATIENT
Start: 2024-02-06 | End: 2024-04-09

## 2024-02-06 NOTE — TELEPHONE ENCOUNTER
Arik Lee MD  You1 hour ago (10:49 AM)     QP  Start amio 200 mg bid until seeing me. Check tsh and alt       Called and left message for patient requesting call back to review Dr. Lee's recommendations as outlined above.  Will order ALT and TSH with T4 once we hear back from patient.       MICKEY Thrasher

## 2024-02-06 NOTE — TELEPHONE ENCOUNTER
"Received alert for 2 additional episodes requiring therapy.      On 2/2/24 at 1511, patient had an episode of VT lasting 6 seconds in the 200s.  This fell in the VF zone and was successfully terminated by ATP x1 with a diverted shock.     On 2/5/24 at 1239, patient had an episode of VT lasting 7 seconds in the 190s that was successfully terminated by ATP x1.      Patient is not on a beta blocker (per previous notes, Metoprolol was switched to Sotalol.  Patient had extreme diarrhea with Sotalol and is not taking this).      Called and left message for patient requesting a call back to discuss symptoms.      Patient is scheduled to see Dr. Lee 3/5/24 to discuss VT ablation.  Will route update to Dr. Lee.  EGMs and therapy settings as below.     MICKEY Thrasher         Tachy Therapy History  - [facility] (11/2020 - present): 45 ATP (w/44 aborted shocks)       - 2/5/24: ATPx1 for VT        - 2/2/24: ATPx1 with aborted shock for VT falling in VF zone       - 1/24/24: ATPx1 for VT         -12/28/23 ATPx1 terminated VT episode rate 214       -11/20/23: ATPx3 (3) separate VF episodes in the 220's with ATP successful each time.        - 11/4/23: ATPx1 terminated 3s VT episode in the 220s       - 3/11 to 4/23/22: 37 VT episodes requiring ATP to terminate (shocks aborted)... metoprolol changed to sotalol  - [facility] (11/2008 - 11/2020): 7 ATP, 7 shocks, 1 diverted shock       - 12/21/18: ATP x1 (and aborted shock) for VT episode       - 12/2011: 2 SVT episodes (in the 150s) inappropriately treated with ATP... tachy zones adjusted       - 1/9 to 1/10/09: inappropriate ATP and shocks x6 for ST... tachy zones adjusted       - 11/2008 - 2009: per EPIC, \"He received an ICD in 2008 for sustained VT. He had 1 appropriate shock for VT in that year.\" No documentation in [facility] regarding this episode.      EGM for 2/2/24 episode:           EGM for 2/5/24 episode:           Device Therapy Settings:       "

## 2024-02-06 NOTE — TELEPHONE ENCOUNTER
Called pt and stated that he did not have any symptoms.  Ordered ALT and TSH.  Sent Amio script to pharmacy.  Pt will call with any further questions or concerns.

## 2024-02-08 NOTE — PROGRESS NOTES
Second ATP alert received for pt after earlier one routed to provider on 2/6/24.  Second ATP occurred   -Feb 06, 2024 13:59 Antitachycardia pacing (ATP) therapy delivered to convert arrhythmia.    EGM shows appropriate/ ATP for 17 beat VT with V rates 190's-200's bpm.      Same day earlier episode routed to provider and medication changes implemented.  Will continue to monitor.    MICKEY Wallace

## 2024-02-14 DIAGNOSIS — E11.42: Primary | ICD-10-CM

## 2024-02-16 ENCOUNTER — LAB (OUTPATIENT)
Dept: LAB | Facility: CLINIC | Age: 74
End: 2024-02-16
Payer: COMMERCIAL

## 2024-02-16 DIAGNOSIS — I25.10 CORONARY ARTERY DISEASE INVOLVING NATIVE CORONARY ARTERY OF NATIVE HEART WITHOUT ANGINA PECTORIS: ICD-10-CM

## 2024-02-16 DIAGNOSIS — Z79.899 LONG TERM CURRENT USE OF AMIODARONE: ICD-10-CM

## 2024-02-16 DIAGNOSIS — Z92.29 H/O AMIODARONE THERAPY: ICD-10-CM

## 2024-02-16 DIAGNOSIS — Z79.4 TYPE 2 DIABETES MELLITUS WITH OTHER CIRCULATORY COMPLICATION, WITH LONG-TERM CURRENT USE OF INSULIN (H): ICD-10-CM

## 2024-02-16 DIAGNOSIS — I49.9 IRREGULAR HEART RATE: ICD-10-CM

## 2024-02-16 DIAGNOSIS — E78.5 HYPERLIPIDEMIA: ICD-10-CM

## 2024-02-16 DIAGNOSIS — E11.59 TYPE 2 DIABETES MELLITUS WITH OTHER CIRCULATORY COMPLICATION, WITH LONG-TERM CURRENT USE OF INSULIN (H): ICD-10-CM

## 2024-02-16 DIAGNOSIS — E11.42: ICD-10-CM

## 2024-02-16 LAB
BASOPHILS # BLD AUTO: 0 10E3/UL (ref 0–0.2)
BASOPHILS NFR BLD AUTO: 0 %
EOSINOPHIL # BLD AUTO: 0 10E3/UL (ref 0–0.7)
EOSINOPHIL NFR BLD AUTO: 0 %
ERYTHROCYTE [DISTWIDTH] IN BLOOD BY AUTOMATED COUNT: 13.3 % (ref 10–15)
HBA1C MFR BLD: 6.6 % (ref 0–5.6)
HCT VFR BLD AUTO: 48.3 % (ref 40–53)
HGB BLD-MCNC: 16.6 G/DL (ref 13.3–17.7)
IMM GRANULOCYTES # BLD: 0 10E3/UL
IMM GRANULOCYTES NFR BLD: 0 %
LYMPHOCYTES # BLD AUTO: 2.5 10E3/UL (ref 0.8–5.3)
LYMPHOCYTES NFR BLD AUTO: 33 %
MCH RBC QN AUTO: 30.1 PG (ref 26.5–33)
MCHC RBC AUTO-ENTMCNC: 34.4 G/DL (ref 31.5–36.5)
MCV RBC AUTO: 88 FL (ref 78–100)
MONOCYTES # BLD AUTO: 0.7 10E3/UL (ref 0–1.3)
MONOCYTES NFR BLD AUTO: 9 %
NEUTROPHILS # BLD AUTO: 4.3 10E3/UL (ref 1.6–8.3)
NEUTROPHILS NFR BLD AUTO: 57 %
PLATELET # BLD AUTO: 288 10E3/UL (ref 150–450)
RBC # BLD AUTO: 5.51 10E6/UL (ref 4.4–5.9)
WBC # BLD AUTO: 7.6 10E3/UL (ref 4–11)

## 2024-02-16 PROCEDURE — 80053 COMPREHEN METABOLIC PANEL: CPT

## 2024-02-16 PROCEDURE — 84443 ASSAY THYROID STIM HORMONE: CPT

## 2024-02-16 PROCEDURE — 85025 COMPLETE CBC W/AUTO DIFF WBC: CPT

## 2024-02-16 PROCEDURE — 36415 COLL VENOUS BLD VENIPUNCTURE: CPT

## 2024-02-16 PROCEDURE — 80061 LIPID PANEL: CPT

## 2024-02-16 PROCEDURE — 83036 HEMOGLOBIN GLYCOSYLATED A1C: CPT

## 2024-02-16 PROCEDURE — 84439 ASSAY OF FREE THYROXINE: CPT

## 2024-02-17 LAB
ALBUMIN SERPL BCG-MCNC: 4.7 G/DL (ref 3.5–5.2)
ALP SERPL-CCNC: 57 U/L (ref 40–150)
ALT SERPL W P-5'-P-CCNC: 21 U/L (ref 0–70)
ANION GAP SERPL CALCULATED.3IONS-SCNC: 20 MMOL/L (ref 7–15)
AST SERPL W P-5'-P-CCNC: 23 U/L (ref 0–45)
BILIRUB SERPL-MCNC: 1.1 MG/DL
BUN SERPL-MCNC: 19.5 MG/DL (ref 8–23)
CALCIUM SERPL-MCNC: 9.8 MG/DL (ref 8.8–10.2)
CHLORIDE SERPL-SCNC: 98 MMOL/L (ref 98–107)
CHOLEST SERPL-MCNC: 232 MG/DL
CREAT SERPL-MCNC: 1.11 MG/DL (ref 0.67–1.17)
DEPRECATED HCO3 PLAS-SCNC: 19 MMOL/L (ref 22–29)
EGFRCR SERPLBLD CKD-EPI 2021: 71 ML/MIN/1.73M2
FASTING STATUS PATIENT QL REPORTED: YES
GLUCOSE SERPL-MCNC: 125 MG/DL (ref 70–99)
HDLC SERPL-MCNC: 43 MG/DL
LDLC SERPL CALC-MCNC: 160 MG/DL
NONHDLC SERPL-MCNC: 189 MG/DL
POTASSIUM SERPL-SCNC: 3.9 MMOL/L (ref 3.4–5.3)
PROT SERPL-MCNC: 7.7 G/DL (ref 6.4–8.3)
SODIUM SERPL-SCNC: 137 MMOL/L (ref 135–145)
T4 FREE SERPL-MCNC: 1.22 NG/DL (ref 0.9–1.7)
TRIGL SERPL-MCNC: 143 MG/DL
TSH SERPL DL<=0.005 MIU/L-ACNC: 5.78 UIU/ML (ref 0.3–4.2)

## 2024-02-19 ENCOUNTER — HOSPITAL ENCOUNTER (OUTPATIENT)
Dept: CARDIOLOGY | Facility: CLINIC | Age: 74
Discharge: HOME | End: 2024-02-19
Attending: INTERNAL MEDICINE | Admitting: INTERNAL MEDICINE
Payer: COMMERCIAL

## 2024-02-19 DIAGNOSIS — I25.5 ISCHEMIC CARDIOMYOPATHY: Primary | ICD-10-CM

## 2024-02-19 LAB — LVEF ECHO: NORMAL

## 2024-02-19 PROCEDURE — 93306 TTE W/DOPPLER COMPLETE: CPT | Mod: 26 | Performed by: INTERNAL MEDICINE

## 2024-02-19 PROCEDURE — 999N000208 ECHOCARDIOGRAM COMPLETE

## 2024-02-19 PROCEDURE — 255N000002 HC RX 255 OP 636: Performed by: INTERNAL MEDICINE

## 2024-02-19 RX ADMIN — HUMAN ALBUMIN MICROSPHERES AND PERFLUTREN 3 ML: 10; .22 INJECTION, SOLUTION INTRAVENOUS at 13:26

## 2024-02-21 ENCOUNTER — OFFICE VISIT (OUTPATIENT)
Dept: CARDIOLOGY | Facility: CLINIC | Age: 74
End: 2024-02-21
Attending: INTERNAL MEDICINE
Payer: COMMERCIAL

## 2024-02-21 VITALS
BODY MASS INDEX: 32.3 KG/M2 | DIASTOLIC BLOOD PRESSURE: 86 MMHG | OXYGEN SATURATION: 100 % | SYSTOLIC BLOOD PRESSURE: 135 MMHG | WEIGHT: 213.1 LBS | HEART RATE: 64 BPM | HEIGHT: 68 IN

## 2024-02-21 DIAGNOSIS — E78.5 HYPERLIPIDEMIA LDL GOAL <70: ICD-10-CM

## 2024-02-21 DIAGNOSIS — I50.22 HEART FAILURE WITH MID-RANGE EJECTION FRACTION (HFMEF) (H): ICD-10-CM

## 2024-02-21 DIAGNOSIS — I25.10 CORONARY ARTERY DISEASE INVOLVING NATIVE CORONARY ARTERY OF NATIVE HEART WITHOUT ANGINA PECTORIS: ICD-10-CM

## 2024-02-21 DIAGNOSIS — I10 BENIGN ESSENTIAL HYPERTENSION: ICD-10-CM

## 2024-02-21 DIAGNOSIS — I25.5 ISCHEMIC CARDIOMYOPATHY: Primary | ICD-10-CM

## 2024-02-21 PROCEDURE — 99214 OFFICE O/P EST MOD 30 MIN: CPT | Performed by: PHYSICIAN ASSISTANT

## 2024-02-21 NOTE — PROGRESS NOTES
Primary Cardiologist: Dr. Deleon/Dr. Lee     Reason for Visit: Routine follow up with labs and echocardiogram    History of Present Illness:   Ralph is a pleasant 74-year-old male with past medical history notable for:    #Premature CAD  - History of delayed presentation of inferior MI leading to CABG x 3 in 11/2008 (LIMA to LAD, SVG to diagonal and PDA)  - On aspirin 81 mg daily-not interested in beta-blocker and intolerant to statins    #Mild ischemic cardiomyopathy/HFmEF  - LVEF is mildly reduced to 45-50% with inferior wall akinesis  - GDMT includes lisinopril and spironolactone (was on metoprolol but this was switched to sotalol and then later to amiodarone due to NSVT and ATP on his defibrillator)  - He is not interested in addition of any medications  - Current diuretic regimen includes furosemide 40 mg daily with no symptoms of hypervolemia    #S/p ICD for NSVT  - Was having recurrent NSVT as well as EP on metoprolol therapy which was switched to sotalol but patient had GI side effects with this and was eventually switched to amiodarone  - She has follow-up with EP in 1 week    #Hyperlipidemia  - Intolerant to statins  - Not interested in ezetimibe or PCSK9 inhibitors    #T2DM  - On metformin and insulin  - Most recent hemoglobin A1c 6.6%  - S/p amputation of right toes and amputation of left lower extremity    #Family history of premature CAD    #Hypertension  - Controlled    Ralph reports doing much better on amiodarone with no side effects.  He continues to be not interested in Zetia or PCSK9 inhibitors.  He tells me he has had enough with pills.  He denies orthopnea, shortness of breath, abdominal distention, or peripheral edema of his right lower extremity.  He denies any exertional symptoms.  He is monitoring his right lower extremity skin very carefully as he does not want another amputation.  He was not able to tolerate statins in the past.  He denies any palpitations or lightheadedness or any issues  with his device since he has been switched to amiodarone.  He has follow-up with EP clinic in 1 week as well as with his PCP.    Assessment and Plan:  Ralph is a pleasant 74-year-old male with past medical history notable for:    #Premature CAD, stable, on aspirin  #Mild ischemic cardiomyopathy/HFmEF, euvolemic based on today's exam, GDMT includes lisinopril and spironolactone  #S/p ICD for NSVT, stable since recent switch to amiodarone but TSH is mildly elevated on recent labs with normal free T4 levels  #Hyperlipidemia, not at goal  #T2DM, insulin-dependent, history of amputations  #Family history of premature CAD  #Hypertension, controlled    Ralph has no symptoms to suggest angina, heart failure, or arrhythmia.  We reviewed his vital signs today which are within normal limits.  We talked about statin alternatives once again and he is adamant that he does not want to start anything additional.  He tells me he has had enough of pills.  We also talked about possibly optimizing his cardiomyopathy medications thinking about adding beta-blocker or switching lisinopril to Entresto.  I have reviewed his recent blood work which showed unremarkable CBC, BMP, and ALT.  His A1c is 6.6% which will be reviewed by his primary care provider.  He does have subclinical hypothyroidism with mildly elevated TSH and normal T4 levels.  I will have EP team make further recommendations on his amiodarone medication.  I suspect they may want to reduce it down to 200 mg daily.  He will also review his TSH level with his primary care provider.  I have reviewed his echocardiogram which showed no new changes.  We will see him back in 6 months or sooner if he has any questions or concerns.    45 minutes spent on the date of the encounter with chart review, patient visit, care coordination, and documentation.      This note was completed in part using Dragon voice recognition software. Although reviewed after completion, some word and grammatical  errors may occur.    Orders this Visit:  No orders of the defined types were placed in this encounter.    No orders of the defined types were placed in this encounter.    There are no discontinued medications.      Encounter Diagnosis   Name Primary?    Ischemic cardiomyopathy        CURRENT MEDICATIONS:  Current Outpatient Medications   Medication Sig Dispense Refill    acetaminophen (TYLENOL) 500 MG tablet Take 500-1,000 mg by mouth every 6 hours as needed for mild pain      amiodarone (PACERONE) 200 MG tablet Take 1 tablet (200 mg) by mouth 2 times daily 60 tablet 3    Aspirin (ASPIR-81 PO) Take 81 mg by mouth daily       fexofenadine (ALLEGRA) 180 MG tablet Take 180 mg by mouth daily      furosemide (LASIX) 20 MG tablet Take 2 tablets (40 mg) by mouth daily 180 tablet 3    insulin aspart (NOVOLOG PEN) 100 UNIT/ML pen Inject 8 Units Subcutaneous 3 times daily (with meals)       insulin glargine (BASAGLAR KWIKPEN) 100 UNIT/ML pen Inject 12 Units Subcutaneous At Bedtime       insulin pen needle (30G X 8 MM) 30G X 8 MM miscellaneous Use 5 pen needles daily or as directed.      lisinopril (ZESTRIL) 40 MG tablet Take 0.5 tablets (20 mg) by mouth daily 50 tablet 4    metFORMIN (GLUCOPHAGE-XR) 500 MG 24 hr tablet Take 500 mg by mouth daily (with dinner)      nitroGLYcerin (NITROSTAT) 0.4 MG sublingual tablet Place 0.4 mg under the tongue every 5 minutes as needed for chest pain For chest pain place 1 tablet under the tongue every 5 minutes for 3 doses. If symptoms persist 5 minutes after 1st dose call 911.      spironolactone (ALDACTONE) 25 MG tablet Take 3 tablets (75 mg) by mouth every morning 300 tablet 4       ALLERGIES   No Known Allergies    PAST MEDICAL HISTORY:  Past Medical History:   Diagnosis Date    Benign essential hypertension     Coronary artery disease     S/p CABG 11/2008 3 vessel w/ LIMA to LAD, SVG to Diagl, SVG to PDA    Diabetes mellitus (H)     History of coronary artery bypass graft x 3 2008     Hyperlipidemia     Ischemic cardiomyopathy 2008    MI (myocardial infarction) (H) 11/2008    inferior    PAD (peripheral artery disease) (H24) 12/31/2019    S/p angioplasty of right posterior tibial artery on 2/19/2019; s/p angioplasty of distal posterior tibial artery to right foot 4/4/19      Ventricular tachycardia (H)        PAST SURGICAL HISTORY:  Past Surgical History:   Procedure Laterality Date    BYPASS GRAFT ARTERY CORONARY  11/2008    3 vessel w/ LIMA to LAD, SVG to Diagl, SVG to PDA    ENDOSCOPIC RETROGRADE CHOLANGIOPANCREATOGRAM N/A 10/6/2018    Procedure: ENDOSCOPIC RETROGRADE CHOLANGIOPANCREATOGRAM;  ENDOSCOPIC ULTRASOUND, ENDOSCOPIC RETROGRADE CHOLANGIOPANCREATOGRAM (MAC);  Surgeon: Oc Yang MD;  Location:  OR    EP ICD GENERATOR REPLACEMENT DUAL N/A 11/17/2020    Procedure: EP ICD Generator Change Dual;  Surgeon: Coty Leonard MD;  Location:  [facility]    ESOPHAGOSCOPY, GASTROSCOPY, DUODENOSCOPY (EGD), COMBINED N/A 10/6/2018    Procedure: COMBINED ENDOSCOPIC ULTRASOUND, ESOPHAGOSCOPY, GASTROSCOPY, DUODENOSCOPY (EGD);;  Surgeon: Oc Yang MD;  Location:  OR     LEFT HEART CATHETERIZATION  10/2008    Emergent thrombectomy and stent to distal RCA    IMPLANT IMPLANTABLE CARDIOVERTER DEFIBRILLATOR  11/2008    dual chamber ICD    IR LOWER EXTREMITY ANGIOGRAM LEFT  1/9/2019    IR LOWER EXTREMITY ANGIOGRAM RIGHT  12/20/2018    LAPAROSCOPIC CHOLECYSTECTOMY N/A 10/7/2018    Procedure: LAPAROSCOPIC CHOLECYSTECTOMY;   Laparoscopic Cholecystectomy;  Surgeon: Dhiraj Matute MD;  Location:  OR       FAMILY HISTORY:  Family History   Problem Relation Age of Onset    Cancer Brother     Diabetes No family hx of     Hypertension No family hx of        SOCIAL HISTORY:  Social History     Socioeconomic History    Marital status:      Spouse name: None    Number of children: None    Years of education: None    Highest education level: None   Tobacco Use     "Smoking status: Never    Smokeless tobacco: Never   Substance and Sexual Activity    Alcohol use: No       Review of Systems:  Skin:        Eyes:       ENT:       Respiratory:  Positive for shortness of breath  Cardiovascular:  chest pain;Negative Positive for;palpitations;edema;fatigue;lightheadedness;dizziness  Gastroenterology:      Genitourinary:       Musculoskeletal:       Neurologic:       Psychiatric:       Heme/Lymph/Imm:       Endocrine:  Positive for diabetes    Physical Exam:  Vitals: /86   Pulse 64   Ht 1.727 m (5' 8\")   Wt 96.7 kg (213 lb 1.6 oz)   SpO2 100%   BMI 32.40 kg/m       GEN:  NAD  NECK: No JVD  C/V:  Regular rate and rhythm, no murmur, rub or gallop.  RESP: Clear to auscultation bilaterally without wheezing, rales, or rhonchi.  GI: Abdomen soft, nontender, nondistended.   EXTREM: No pitting Right LE edema. Left LE amputated.  NEURO: Alert and oriented, cooperative. No obvious focal deficits.   PSYCH: Normal affect.  SKIN: Warm and dry.       Recent Lab Results:  LIPID RESULTS:  Lab Results   Component Value Date    CHOL 232 (H) 02/16/2024    CHOL 167 04/04/2019    HDL 43 02/16/2024    HDL 22 04/04/2019     (H) 02/16/2024     04/04/2019    TRIG 143 02/16/2024    TRIG 163 04/04/2019    CHOLHDLRATIO 4.7 10/18/2008       LIVER ENZYME RESULTS:  Lab Results   Component Value Date    AST 23 02/16/2024    AST 67 (H) 10/08/2018    ALT 21 02/16/2024     (H) 10/08/2018       CBC RESULTS:  Lab Results   Component Value Date    WBC 7.6 02/16/2024    WBC 6.1 11/17/2020    RBC 5.51 02/16/2024    RBC 4.75 11/17/2020    HGB 16.6 02/16/2024    HGB 14.3 11/17/2020    HCT 48.3 02/16/2024    HCT 42.5 11/17/2020    MCV 88 02/16/2024    MCV 90 11/17/2020    MCH 30.1 02/16/2024    MCH 30.1 11/17/2020    MCHC 34.4 02/16/2024    MCHC 33.6 11/17/2020    RDW 13.3 02/16/2024    RDW 13.6 11/17/2020     02/16/2024     11/17/2020       BMP RESULTS:  Lab Results   Component " Value Date     02/16/2024     11/17/2020    POTASSIUM 3.9 02/16/2024    POTASSIUM 4.1 11/17/2020    CHLORIDE 98 02/16/2024    CHLORIDE 105 07/15/2022    CHLORIDE 109 11/17/2020    CO2 19 (L) 02/16/2024    CO2 23 11/17/2020    ANIONGAP 20 (H) 02/16/2024    ANIONGAP 8 11/17/2020     (H) 02/16/2024     (H) 11/17/2020    BUN 19.5 02/16/2024    BUN 18 11/17/2020    CR 1.11 02/16/2024    CR 0.68 11/17/2020    GFRESTIMATED 71 02/16/2024    GFRESTIMATED >90 11/17/2020    GFRESTBLACK >90 11/17/2020    CLAIR 9.8 02/16/2024    CLAIR 9.6 11/17/2020        A1C RESULTS:  Lab Results   Component Value Date    A1C 6.6 (H) 02/16/2024    A1C 7.5 (H) 10/05/2018       INR RESULTS:  Lab Results   Component Value Date    INR 1.18 (H) 11/13/2008    INR 1.11 11/09/2008           [place] Back PA-C  February 21, 2024

## 2024-02-21 NOTE — LETTER
2/21/2024    Edwina Rodriguez MD  [address] [place] [address]    RE: Go Saavedra       Dear Colleague,     I had the pleasure of seeing Go Saavedra in the [facility].  Primary Cardiologist: Dr. Deleon/Dr. Lee     Reason for Visit: Routine follow up with labs and echocardiogram    History of Present Illness:   Ralph is a pleasant 74-year-old male with past medical history notable for:    #Premature CAD  - History of delayed presentation of inferior MI leading to CABG x 3 in 11/2008 (LIMA to LAD, SVG to diagonal and PDA)  - On aspirin 81 mg daily-not interested in beta-blocker and intolerant to statins    #Mild ischemic cardiomyopathy/HFmEF  - LVEF is mildly reduced to 45-50% with inferior wall akinesis  - GDMT includes lisinopril and spironolactone (was on metoprolol but this was switched to sotalol and then later to amiodarone due to NSVT and ATP on his defibrillator)  - He is not interested in addition of any medications  - Current diuretic regimen includes furosemide 40 mg daily with no symptoms of hypervolemia    #S/p ICD for NSVT  - Was having recurrent NSVT as well as EP on metoprolol therapy which was switched to sotalol but patient had GI side effects with this and was eventually switched to amiodarone  - She has follow-up with EP in 1 week    #Hyperlipidemia  - Intolerant to statins  - Not interested in ezetimibe or PCSK9 inhibitors    #T2DM  - On metformin and insulin  - Most recent hemoglobin A1c 6.6%  - S/p amputation of right toes and amputation of left lower extremity    #Family history of premature CAD    #Hypertension  - Controlled    Ralph reports doing much better on amiodarone with no side effects.  He continues to be not interested in Zetia or PCSK9 inhibitors.  He tells me he has had enough with pills.  He denies orthopnea, shortness of breath, abdominal distention, or peripheral edema of his right lower extremity.  He denies any exertional  symptoms.  He is monitoring his right lower extremity skin very carefully as he does not want another amputation.  He was not able to tolerate statins in the past.  He denies any palpitations or lightheadedness or any issues with his device since he has been switched to amiodarone.  He has follow-up with EP clinic in 1 week as well as with his PCP.    Assessment and Plan:  Ralph is a pleasant 74-year-old male with past medical history notable for:    #Premature CAD, stable, on aspirin  #Mild ischemic cardiomyopathy/HFmEF, euvolemic based on today's exam, GDMT includes lisinopril and spironolactone  #S/p ICD for NSVT, stable since recent switch to amiodarone but TSH is mildly elevated on recent labs with normal free T4 levels  #Hyperlipidemia, not at goal  #T2DM, insulin-dependent, history of amputations  #Family history of premature CAD  #Hypertension, controlled    Ralph has no symptoms to suggest angina, heart failure, or arrhythmia.  We reviewed his vital signs today which are within normal limits.  We talked about statin alternatives once again and he is adamant that he does not want to start anything additional.  He tells me he has had enough of pills.  We also talked about possibly optimizing his cardiomyopathy medications thinking about adding beta-blocker or switching lisinopril to Entresto.  I have reviewed his recent blood work which showed unremarkable CBC, BMP, and ALT.  His A1c is 6.6% which will be reviewed by his primary care provider.  He does have subclinical hypothyroidism with mildly elevated TSH and normal T4 levels.  I will have EP team make further recommendations on his amiodarone medication.  I suspect they may want to reduce it down to 200 mg daily.  He will also review his TSH level with his primary care provider.  I have reviewed his echocardiogram which showed no new changes.  We will see him back in 6 months or sooner if he has any questions or concerns.    45 minutes spent on the date of  the encounter with chart review, patient visit, care coordination, and documentation.      This note was completed in part using Dragon voice recognition software. Although reviewed after completion, some word and grammatical errors may occur.    Orders this Visit:  No orders of the defined types were placed in this encounter.    No orders of the defined types were placed in this encounter.    There are no discontinued medications.      Encounter Diagnosis   Name Primary?    Ischemic cardiomyopathy        CURRENT MEDICATIONS:  Current Outpatient Medications   Medication Sig Dispense Refill    acetaminophen (TYLENOL) 500 MG tablet Take 500-1,000 mg by mouth every 6 hours as needed for mild pain      amiodarone (PACERONE) 200 MG tablet Take 1 tablet (200 mg) by mouth 2 times daily 60 tablet 3    Aspirin (ASPIR-81 PO) Take 81 mg by mouth daily       fexofenadine (ALLEGRA) 180 MG tablet Take 180 mg by mouth daily      furosemide (LASIX) 20 MG tablet Take 2 tablets (40 mg) by mouth daily 180 tablet 3    insulin aspart (NOVOLOG PEN) 100 UNIT/ML pen Inject 8 Units Subcutaneous 3 times daily (with meals)       insulin glargine (BASAGLAR KWIKPEN) 100 UNIT/ML pen Inject 12 Units Subcutaneous At Bedtime       insulin pen needle (30G X 8 MM) 30G X 8 MM miscellaneous Use 5 pen needles daily or as directed.      lisinopril (ZESTRIL) 40 MG tablet Take 0.5 tablets (20 mg) by mouth daily 50 tablet 4    metFORMIN (GLUCOPHAGE-XR) 500 MG 24 hr tablet Take 500 mg by mouth daily (with dinner)      nitroGLYcerin (NITROSTAT) 0.4 MG sublingual tablet Place 0.4 mg under the tongue every 5 minutes as needed for chest pain For chest pain place 1 tablet under the tongue every 5 minutes for 3 doses. If symptoms persist 5 minutes after 1st dose call 911.      spironolactone (ALDACTONE) 25 MG tablet Take 3 tablets (75 mg) by mouth every morning 300 tablet 4       ALLERGIES   No Known Allergies    PAST MEDICAL HISTORY:  Past Medical History:    Diagnosis Date    Benign essential hypertension     Coronary artery disease     S/p CABG 11/2008 3 vessel w/ LIMA to LAD, SVG to Diagl, SVG to PDA    Diabetes mellitus (H)     History of coronary artery bypass graft x 3 2008    Hyperlipidemia     Ischemic cardiomyopathy 2008    MI (myocardial infarction) (H) 11/2008    inferior    PAD (peripheral artery disease) (H24) 12/31/2019    S/p angioplasty of right posterior tibial artery on 2/19/2019; s/p angioplasty of distal posterior tibial artery to right foot 4/4/19      Ventricular tachycardia (H)        PAST SURGICAL HISTORY:  Past Surgical History:   Procedure Laterality Date    BYPASS GRAFT ARTERY CORONARY  11/2008    3 vessel w/ LIMA to LAD, SVG to Diagl, SVG to PDA    ENDOSCOPIC RETROGRADE CHOLANGIOPANCREATOGRAM N/A 10/6/2018    Procedure: ENDOSCOPIC RETROGRADE CHOLANGIOPANCREATOGRAM;  ENDOSCOPIC ULTRASOUND, ENDOSCOPIC RETROGRADE CHOLANGIOPANCREATOGRAM (MAC);  Surgeon: Oc Yang MD;  Location:  OR    EP ICD GENERATOR REPLACEMENT DUAL N/A 11/17/2020    Procedure: EP ICD Generator Change Dual;  Surgeon: Coty Leonard MD;  Location:  HEART CARDIAC CATH LAB    ESOPHAGOSCOPY, GASTROSCOPY, DUODENOSCOPY (EGD), COMBINED N/A 10/6/2018    Procedure: COMBINED ENDOSCOPIC ULTRASOUND, ESOPHAGOSCOPY, GASTROSCOPY, DUODENOSCOPY (EGD);;  Surgeon: Oc Yang MD;  Location:  OR     LEFT HEART CATHETERIZATION  10/2008    Emergent thrombectomy and stent to distal RCA    IMPLANT IMPLANTABLE CARDIOVERTER DEFIBRILLATOR  11/2008    dual chamber ICD    IR LOWER EXTREMITY ANGIOGRAM LEFT  1/9/2019    IR LOWER EXTREMITY ANGIOGRAM RIGHT  12/20/2018    LAPAROSCOPIC CHOLECYSTECTOMY N/A 10/7/2018    Procedure: LAPAROSCOPIC CHOLECYSTECTOMY;   Laparoscopic Cholecystectomy;  Surgeon: Dhiraj Matute MD;  Location:  OR       FAMILY HISTORY:  Family History   Problem Relation Age of Onset    Cancer Brother     Diabetes No family hx of     Hypertension No  "family hx of        SOCIAL HISTORY:  Social History     Socioeconomic History    Marital status:      Spouse name: None    Number of children: None    Years of education: None    Highest education level: None   Tobacco Use    Smoking status: Never    Smokeless tobacco: Never   Substance and Sexual Activity    Alcohol use: No       Review of Systems:  Skin:        Eyes:       ENT:       Respiratory:  Positive for shortness of breath  Cardiovascular:  chest pain;Negative Positive for;palpitations;edema;fatigue;lightheadedness;dizziness  Gastroenterology:      Genitourinary:       Musculoskeletal:       Neurologic:       Psychiatric:       Heme/Lymph/Imm:       Endocrine:  Positive for diabetes    Physical Exam:  Vitals: /86   Pulse 64   Ht 1.727 m (5' 8\")   Wt 96.7 kg (213 lb 1.6 oz)   SpO2 100%   BMI 32.40 kg/m       GEN:  NAD  NECK: No JVD  C/V:  Regular rate and rhythm, no murmur, rub or gallop.  RESP: Clear to auscultation bilaterally without wheezing, rales, or rhonchi.  GI: Abdomen soft, nontender, nondistended.   EXTREM: No pitting Right LE edema. Left LE amputated.  NEURO: Alert and oriented, cooperative. No obvious focal deficits.   PSYCH: Normal affect.  SKIN: Warm and dry.       Recent Lab Results:  LIPID RESULTS:  Lab Results   Component Value Date    CHOL 232 (H) 02/16/2024    CHOL 167 04/04/2019    HDL 43 02/16/2024    HDL 22 04/04/2019     (H) 02/16/2024     04/04/2019    TRIG 143 02/16/2024    TRIG 163 04/04/2019    CHOLHDLRATIO 4.7 10/18/2008       LIVER ENZYME RESULTS:  Lab Results   Component Value Date    AST 23 02/16/2024    AST 67 (H) 10/08/2018    ALT 21 02/16/2024     (H) 10/08/2018       CBC RESULTS:  Lab Results   Component Value Date    WBC 7.6 02/16/2024    WBC 6.1 11/17/2020    RBC 5.51 02/16/2024    RBC 4.75 11/17/2020    HGB 16.6 02/16/2024    HGB 14.3 11/17/2020    HCT 48.3 02/16/2024    HCT 42.5 11/17/2020    MCV 88 02/16/2024    MCV 90 11/17/2020 "    MCH 30.1 02/16/2024    MCH 30.1 11/17/2020    MCHC 34.4 02/16/2024    MCHC 33.6 11/17/2020    RDW 13.3 02/16/2024    RDW 13.6 11/17/2020     02/16/2024     11/17/2020       BMP RESULTS:  Lab Results   Component Value Date     02/16/2024     11/17/2020    POTASSIUM 3.9 02/16/2024    POTASSIUM 4.1 11/17/2020    CHLORIDE 98 02/16/2024    CHLORIDE 105 07/15/2022    CHLORIDE 109 11/17/2020    CO2 19 (L) 02/16/2024    CO2 23 11/17/2020    ANIONGAP 20 (H) 02/16/2024    ANIONGAP 8 11/17/2020     (H) 02/16/2024     (H) 11/17/2020    BUN 19.5 02/16/2024    BUN 18 11/17/2020    CR 1.11 02/16/2024    CR 0.68 11/17/2020    GFRESTIMATED 71 02/16/2024    GFRESTIMATED >90 11/17/2020    GFRESTBLACK >90 11/17/2020    CLAIR 9.8 02/16/2024    CLAIR 9.6 11/17/2020        A1C RESULTS:  Lab Results   Component Value Date    A1C 6.6 (H) 02/16/2024    A1C 7.5 (H) 10/05/2018       INR RESULTS:  Lab Results   Component Value Date    INR 1.18 (H) 11/13/2008    INR 1.11 11/09/2008           Montana Back PA-C  February 21, 2024     Thank you for allowing me to participate in the care of your patient.      Sincerely,     [place]ana Back PA-C     [facility]  cc:   Nga Deleon MD  [address] [place] [id number]

## 2024-03-05 ENCOUNTER — ANCILLARY PROCEDURE (OUTPATIENT)
Dept: CARDIOLOGY | Facility: CLINIC | Age: 74
End: 2024-03-05
Attending: INTERNAL MEDICINE
Payer: COMMERCIAL

## 2024-03-05 ENCOUNTER — DOCUMENTATION ONLY (OUTPATIENT)
Dept: CARDIOLOGY | Facility: CLINIC | Age: 74
End: 2024-03-05

## 2024-03-05 VITALS
RESPIRATION RATE: 17 BRPM | BODY MASS INDEX: 32.13 KG/M2 | DIASTOLIC BLOOD PRESSURE: 68 MMHG | OXYGEN SATURATION: 100 % | SYSTOLIC BLOOD PRESSURE: 133 MMHG | HEIGHT: 68 IN | WEIGHT: 212 LBS | HEART RATE: 81 BPM

## 2024-03-05 DIAGNOSIS — Z79.899 ENCOUNTER FOR MONITORING SOTALOL THERAPY: ICD-10-CM

## 2024-03-05 DIAGNOSIS — I25.10 CORONARY ARTERY DISEASE INVOLVING NATIVE CORONARY ARTERY OF NATIVE HEART WITHOUT ANGINA PECTORIS: ICD-10-CM

## 2024-03-05 DIAGNOSIS — Z51.81 ENCOUNTER FOR MONITORING SOTALOL THERAPY: ICD-10-CM

## 2024-03-05 DIAGNOSIS — I25.5 ISCHEMIC CARDIOMYOPATHY: ICD-10-CM

## 2024-03-05 DIAGNOSIS — R60.0 LOWER EXTREMITY EDEMA: ICD-10-CM

## 2024-03-05 DIAGNOSIS — Z95.810 ICD (IMPLANTABLE CARDIOVERTER-DEFIBRILLATOR) IN PLACE: ICD-10-CM

## 2024-03-05 DIAGNOSIS — I47.20 VENTRICULAR TACHYCARDIA (H): ICD-10-CM

## 2024-03-05 DIAGNOSIS — I50.41 ACUTE COMBINED SYSTOLIC (CONGESTIVE) AND DIASTOLIC (CONGESTIVE) HEART FAILURE (H): ICD-10-CM

## 2024-03-05 LAB
MDC_IDC_EPISODE_DTM: NORMAL
MDC_IDC_EPISODE_ID: NORMAL
MDC_IDC_EPISODE_TYPE: NORMAL
MDC_IDC_LEAD_CONNECTION_STATUS: NORMAL
MDC_IDC_LEAD_IMPLANT_DT: NORMAL
MDC_IDC_LEAD_LOCATION: NORMAL
MDC_IDC_LEAD_LOCATION_DETAIL_1: NORMAL
MDC_IDC_LEAD_MFG: NORMAL
MDC_IDC_LEAD_MODEL: NORMAL
MDC_IDC_LEAD_POLARITY_TYPE: NORMAL
MDC_IDC_LEAD_SERIAL: NORMAL
MDC_IDC_MSMT_BATTERY_DTM: NORMAL
MDC_IDC_MSMT_BATTERY_REMAINING_LONGEVITY: 156 MO
MDC_IDC_MSMT_BATTERY_REMAINING_PERCENTAGE: 100 %
MDC_IDC_MSMT_BATTERY_STATUS: NORMAL
MDC_IDC_MSMT_CAP_CHARGE_DTM: NORMAL
MDC_IDC_MSMT_CAP_CHARGE_TIME: 9.9 S
MDC_IDC_MSMT_CAP_CHARGE_TYPE: NORMAL
MDC_IDC_MSMT_LEADCHNL_RV_IMPEDANCE_VALUE: 397 OHM
MDC_IDC_MSMT_LEADCHNL_RV_PACING_THRESHOLD_AMPLITUDE: 1.4 V
MDC_IDC_MSMT_LEADCHNL_RV_PACING_THRESHOLD_PULSEWIDTH: 0.4 MS
MDC_IDC_PG_IMPLANT_DTM: NORMAL
MDC_IDC_PG_MFG: NORMAL
MDC_IDC_PG_MODEL: NORMAL
MDC_IDC_PG_SERIAL: NORMAL
MDC_IDC_PG_TYPE: NORMAL
MDC_IDC_SESS_CLINIC_NAME: NORMAL
MDC_IDC_SESS_DTM: NORMAL
MDC_IDC_SESS_TYPE: NORMAL
MDC_IDC_SET_BRADY_LOWRATE: 40 {BEATS}/MIN
MDC_IDC_SET_BRADY_MODE: NORMAL
MDC_IDC_SET_LEADCHNL_RV_PACING_AMPLITUDE: 2.8 V
MDC_IDC_SET_LEADCHNL_RV_PACING_CAPTURE_MODE: NORMAL
MDC_IDC_SET_LEADCHNL_RV_PACING_POLARITY: NORMAL
MDC_IDC_SET_LEADCHNL_RV_PACING_PULSEWIDTH: 0.4 MS
MDC_IDC_SET_LEADCHNL_RV_SENSING_ADAPTATION_MODE: NORMAL
MDC_IDC_SET_LEADCHNL_RV_SENSING_POLARITY: NORMAL
MDC_IDC_SET_LEADCHNL_RV_SENSING_SENSITIVITY: 0.6 MV
MDC_IDC_SET_ZONE_DETECTION_INTERVAL: 300 MS
MDC_IDC_SET_ZONE_DETECTION_INTERVAL: 353 MS
MDC_IDC_SET_ZONE_DETECTION_INTERVAL: 400 MS
MDC_IDC_SET_ZONE_STATUS: NORMAL
MDC_IDC_SET_ZONE_TYPE: NORMAL
MDC_IDC_SET_ZONE_VENDOR_TYPE: NORMAL
MDC_IDC_STAT_BRADY_DTM_END: NORMAL
MDC_IDC_STAT_BRADY_DTM_START: NORMAL
MDC_IDC_STAT_BRADY_RV_PERCENT_PACED: 0 %
MDC_IDC_STAT_EPISODE_RECENT_COUNT: 0
MDC_IDC_STAT_EPISODE_RECENT_COUNT: 1
MDC_IDC_STAT_EPISODE_RECENT_COUNT: 16
MDC_IDC_STAT_EPISODE_RECENT_COUNT: 7
MDC_IDC_STAT_EPISODE_RECENT_COUNT_DTM_END: NORMAL
MDC_IDC_STAT_EPISODE_RECENT_COUNT_DTM_START: NORMAL
MDC_IDC_STAT_EPISODE_TYPE: NORMAL
MDC_IDC_STAT_EPISODE_VENDOR_TYPE: NORMAL
MDC_IDC_STAT_TACHYTHERAPY_ATP_DELIVERED_RECENT: 46
MDC_IDC_STAT_TACHYTHERAPY_ATP_DELIVERED_TOTAL: 46
MDC_IDC_STAT_TACHYTHERAPY_RECENT_DTM_END: NORMAL
MDC_IDC_STAT_TACHYTHERAPY_RECENT_DTM_START: NORMAL
MDC_IDC_STAT_TACHYTHERAPY_SHOCKS_ABORTED_RECENT: 45
MDC_IDC_STAT_TACHYTHERAPY_SHOCKS_ABORTED_TOTAL: 45
MDC_IDC_STAT_TACHYTHERAPY_SHOCKS_DELIVERED_RECENT: 0
MDC_IDC_STAT_TACHYTHERAPY_SHOCKS_DELIVERED_TOTAL: 0
MDC_IDC_STAT_TACHYTHERAPY_TOTAL_DTM_END: NORMAL
MDC_IDC_STAT_TACHYTHERAPY_TOTAL_DTM_START: NORMAL

## 2024-03-05 PROCEDURE — 99215 OFFICE O/P EST HI 40 MIN: CPT | Mod: 25 | Performed by: INTERNAL MEDICINE

## 2024-03-05 PROCEDURE — 93000 ELECTROCARDIOGRAM COMPLETE: CPT | Mod: 59 | Performed by: INTERNAL MEDICINE

## 2024-03-05 PROCEDURE — 93282 PRGRMG EVAL IMPLANTABLE DFB: CPT | Performed by: INTERNAL MEDICINE

## 2024-03-05 NOTE — PROGRESS NOTES
"  Electrophysiology Clinic Progress Note    Go Saavedra MRN# [id number]   YOB: 1950 Age: 74 year old     Primary cardiologist:          Assessment and Plan   Delightful 74  year old patient with coronary artery disease status post CABG x3 in 2008, mild ischemic cardiomyopathy, hyperlipidemia, hypertension, ventricular tachycardia status post ICD, type 2 diabetes complicated by peripheral neuropathy and peripheral vascular disease status post recent left AKA on 4/12/2022 who is here today for increasing VT burden. Unable to tolerate Sotalol. Currently on Amio 200 mg bid. Nsvt noted but no sustained VT required ICD therapy. Tolerating amio well. Elevated tsh around 5 but normal T4.  VT history as below:    Tachy Therapy History  - Resonate (11/2020 - present): 45Â ATP (w/44Â aborted shocks)   - 2/6/24: ATPx1 for VT (started on amio)   - 2/5/24: ATPx1 for VT   - 2/2/24: ATPx1 with aborted shock for VT falling in VF zone  - 1/24/24: ATPx1 for VT Â   -12/28/23 ATPx1 terminated VT episode rate 214  11/20/23: ATPx3 (3) separate VF episodes in the 220's with ATP successful each time.   - 11/4/23: ATPx1 terminated 3s VT episode in the 220s  - 3/11 to 4/23/22: 37 VT episodes requiring ATP to terminate (shocks aborted)... metoprolol changed to sotalol     - Teligen (11/2008 - 11/2020): 7 ATP, 7 shocks, 1 diverted shock   - 12/21/18: ATP x1 (and aborted shock) for VT episode  - 12/2011: 2 SVT episodes (in the 150s) inappropriately treated with ATP... tachy zones adjusted  - 1/9 to 1/10/09: inappropriate ATP and shocks x6 for ST... tachy zones adjusted  - 11/2008 - 2009: per EPIC, \"He received an ICD in 2008 for sustained VT. He had 1 appropriate shock for VT in that year.\" No documentation in paceart regarding this episode.      Ecg today shows nsr with q-waves noted in inferior leads. Last month's echo showed EF 45% with inferior akinesis.    Recurrent scar related Vt despite being on sotalol but has been " "quiescent on amio. Discussed the option of continuing amio with risk of hypothyroidism vs. Ablation. Pt opter for the former. Will check device a month from now. Reduce amio to 200 mg daily if no VT requiring therapy. Check tsh and alt at upcoming [facility] appt in 3 months.           Review of Systems     12-pt ROS is negative except for as noted in the HPI.          Physical Exam     Vitals: /68   Pulse 81   Resp 17   Ht 1.727 m (5' 8\")   Wt 96.2 kg (212 lb)   SpO2 100%   BMI 32.23 kg/m    Wt Readings from Last 10 Encounters:   03/05/24 96.2 kg (212 lb)   02/21/24 96.7 kg (213 lb 1.6 oz)   07/01/22 88.9 kg (196 lb)   05/18/22 85.7 kg (189 lb)   11/17/20 97.5 kg (215 lb)   11/13/20 97.5 kg (215 lb)   01/22/20 110.1 kg (242 lb 11.2 oz)   10/08/18 117 kg (257 lb 15 oz)   10/26/17 117.1 kg (258 lb 1.6 oz)   09/29/17 118.8 kg (261 lb 12.8 oz)       Constitutional:  Patient is pleasant, alert, cooperative, and in NAD.  HEENT:  NCAT. PERRLA. EOM's intact.   Neck:  CVP appears normal. No carotid bruits.   Pulmonary: Normal respiratory effort. CTAB.   Cardiac: RRR, normal S1/S2, no S3/S4, no murmur or rub.   Abdomen:  Non-tender abdomen, no hepatosplenomegaly appreciated.   Vascular: Pulses in the upper and lower extremities are 2+ and equal bilaterally.  Extremities: No edema, erythema, cyanosis or tenderness appreciated. Left prosthesis.  Skin:  No rashes or lesions appreciated.   Neurological:  No gross motor or sensory deficits.   Psych: Appropriate affect.          Data   Labs reviewed:  Recent Labs   Lab Test 02/16/24  1146 04/04/19  0000   * 112   HDL 43 22   NHDL 189* 145   CHOL 232* 167   TRIG 143 163   TSH 5.78*  --        Lab Results   Component Value Date    WBC 7.6 02/16/2024    WBC 6.1 11/17/2020    RBC 5.51 02/16/2024    RBC 4.75 11/17/2020    HGB 16.6 02/16/2024    HGB 14.3 11/17/2020    HCT 48.3 02/16/2024    HCT 42.5 11/17/2020    MCV 88 02/16/2024    MCV 90 11/17/2020    MCH 30.1 02/16/2024 "    MCH 30.1 11/17/2020    MCHC 34.4 02/16/2024    MCHC 33.6 11/17/2020    RDW 13.3 02/16/2024    RDW 13.6 11/17/2020     02/16/2024     11/17/2020       Lab Results   Component Value Date     02/16/2024     11/17/2020    POTASSIUM 3.9 02/16/2024    POTASSIUM 4.1 11/17/2020    CHLORIDE 98 02/16/2024    CHLORIDE 105 07/15/2022    CHLORIDE 109 11/17/2020    CO2 19 (L) 02/16/2024    CO2 23 11/17/2020    ANIONGAP 20 (H) 02/16/2024    ANIONGAP 8 11/17/2020     (H) 02/16/2024     (H) 11/17/2020    BUN 19.5 02/16/2024    BUN 18 11/17/2020    CR 1.11 02/16/2024    CR 0.68 11/17/2020    GFRESTIMATED 71 02/16/2024    GFRESTIMATED >90 11/17/2020    GFRESTBLACK >90 11/17/2020    CLAIR 9.8 02/16/2024    CLAIR 9.6 11/17/2020      Lab Results   Component Value Date    AST 23 02/16/2024    AST 67 (H) 10/08/2018    ALT 21 02/16/2024     (H) 10/08/2018       Lab Results   Component Value Date    A1C 6.6 (H) 02/16/2024    A1C 7.5 (H) 10/05/2018       Lab Results   Component Value Date    INR 1.18 (H) 11/13/2008    INR 1.11 11/09/2008            Problem List     Patient Active Problem List   Diagnosis    Ventricular tachycardia (H)    Coronary artery disease involving native coronary artery of native heart without angina pectoris    Hyperlipidemia    PAD (peripheral artery disease) (H24)    ICD (implantable cardioverter-defibrillator) in place    Encounter for servicing of implantable cardioverter-defibrillator (ICD) at end of battery life    Ischemic cardiomyopathy    Benign essential hypertension            Medications     Current Outpatient Medications   Medication Sig Dispense Refill    acetaminophen (TYLENOL) 500 MG tablet Take 500-1,000 mg by mouth every 6 hours as needed for mild pain      amiodarone (PACERONE) 200 MG tablet Take 1 tablet (200 mg) by mouth 2 times daily 60 tablet 3    Aspirin (ASPIR-81 PO) Take 81 mg by mouth daily       fexofenadine (ALLEGRA) 180 MG tablet Take 180 mg  by mouth daily      furosemide (LASIX) 20 MG tablet Take 2 tablets (40 mg) by mouth daily 180 tablet 3    insulin aspart (NOVOLOG PEN) 100 UNIT/ML pen Inject 8 Units Subcutaneous 3 times daily (with meals)       insulin glargine (BASAGLAR KWIKPEN) 100 UNIT/ML pen Inject 16 Units Subcutaneous at bedtime      insulin pen needle (30G X 8 MM) 30G X 8 MM miscellaneous Use 5 pen needles daily or as directed.      lisinopril (ZESTRIL) 40 MG tablet Take 0.5 tablets (20 mg) by mouth daily 50 tablet 4    metFORMIN (GLUCOPHAGE-XR) 500 MG 24 hr tablet Take 500 mg by mouth daily (with dinner)      nitroGLYcerin (NITROSTAT) 0.4 MG sublingual tablet Place 0.4 mg under the tongue every 5 minutes as needed for chest pain For chest pain place 1 tablet under the tongue every 5 minutes for 3 doses. If symptoms persist 5 minutes after 1st dose call 911.      spironolactone (ALDACTONE) 25 MG tablet Take 3 tablets (75 mg) by mouth every morning 300 tablet 4            Past Medical History     Past Medical History:   Diagnosis Date    Benign essential hypertension     Coronary artery disease     S/p CABG 11/2008 3 vessel w/ LIMA to LAD, SVG to Diagl, SVG to PDA    Diabetes mellitus (H)     History of coronary artery bypass graft x 3 2008    Hyperlipidemia     Ischemic cardiomyopathy 2008    MI (myocardial infarction) (H) 11/2008    inferior    PAD (peripheral artery disease) (H24) 12/31/2019    S/p angioplasty of right posterior tibial artery on 2/19/2019; s/p angioplasty of distal posterior tibial artery to right foot 4/4/19      Ventricular tachycardia (H)      Past Surgical History:   Procedure Laterality Date    BYPASS GRAFT ARTERY CORONARY  11/2008    3 vessel w/ LIMA to LAD, SVG to Diagl, SVG to PDA    ENDOSCOPIC RETROGRADE CHOLANGIOPANCREATOGRAM N/A 10/6/2018    Procedure: ENDOSCOPIC RETROGRADE CHOLANGIOPANCREATOGRAM;  ENDOSCOPIC ULTRASOUND, ENDOSCOPIC RETROGRADE CHOLANGIOPANCREATOGRAM (MAC);  Surgeon: Oc Yang MD;   Location: [facility] OR    EP ICD GENERATOR REPLACEMENT DUAL N/A 11/17/2020    Procedure: EP ICD Generator Change Dual;  Surgeon: Coty Leonard MD;  Location:  HEART CARDIAC CATH LAB    ESOPHAGOSCOPY, GASTROSCOPY, DUODENOSCOPY (EGD), COMBINED N/A 10/6/2018    Procedure: COMBINED ENDOSCOPIC ULTRASOUND, ESOPHAGOSCOPY, GASTROSCOPY, DUODENOSCOPY (EGD);;  Surgeon: Oc Yang MD;  Location:  OR    HC LEFT HEART CATHETERIZATION  10/2008    Emergent thrombectomy and stent to distal RCA    IMPLANT IMPLANTABLE CARDIOVERTER DEFIBRILLATOR  11/2008    dual chamber ICD    IR LOWER EXTREMITY ANGIOGRAM LEFT  1/9/2019    IR LOWER EXTREMITY ANGIOGRAM RIGHT  12/20/2018    LAPAROSCOPIC CHOLECYSTECTOMY N/A 10/7/2018    Procedure: LAPAROSCOPIC CHOLECYSTECTOMY;   Laparoscopic Cholecystectomy;  Surgeon: Dhiraj Matute MD;  Location:  OR     Family History   Problem Relation Age of Onset    Cancer Brother     Diabetes No family hx of     Hypertension No family hx of      Social History     Socioeconomic History    Marital status:      Spouse name: Not on file    Number of children: Not on file    Years of education: Not on file    Highest education level: Not on file   Occupational History    Not on file   Tobacco Use    Smoking status: Never    Smokeless tobacco: Never   Substance and Sexual Activity    Alcohol use: No    Drug use: Not on file    Sexual activity: Not on file   Other Topics Concern    Parent/sibling w/ CABG, MI or angioplasty before 65F 55M? Not Asked   Social History Narrative    Not on file     Social Determinants of Health     Financial Resource Strain: Not on file   Food Insecurity: Not on file   Transportation Needs: Not on file   Physical Activity: Not on file   Stress: Not on file   Social Connections: Not on file   Interpersonal Safety: Not on file   Housing Stability: Not on file            Allergies   Patient has no known allergies.    Today's clinic visit entailed:  The following tests were  independently interpreted by me as noted in my documentation: device check, echo  45 minutes spent by me on the date of the encounter doing chart review, review of test results, and patient visit   Provider  Link to MDM Help Grid     The level of medical decision making during this visit was of moderate complexity.

## 2024-03-05 NOTE — PROGRESS NOTES
Arik Lee MD  P [facility] p Heart Device Nurse  Hi, please set phone check in a month. If no vt requiring atp please reduce amio to 200 mg daily. Thanks, qp    Remote made for 4/8/2024    Laura AREVALO

## 2024-03-05 NOTE — LETTER
"3/5/2024    Edwina Rodriguez MD  [address] [place] [address]    RE: Go Saavedra       Dear Colleague,     I had the pleasure of seeing Go Saavedra in the [facility].    Electrophysiology Clinic Progress Note    Go Saavedra MRN# [id number]   YOB: 1950 Age: 74 year old     Primary cardiologist:          Assessment and Plan   Delightful 74  year old patient with coronary artery disease status post CABG x3 in 2008, mild ischemic cardiomyopathy, hyperlipidemia, hypertension, ventricular tachycardia status post ICD, type 2 diabetes complicated by peripheral neuropathy and peripheral vascular disease status post recent left AKA on 4/12/2022 who is here today for increasing VT burden. Unable to tolerate Sotalol. Currently on Amio 200 mg bid. Nsvt noted but no sustained VT required ICD therapy. Tolerating amio well. Elevated tsh around 5 but normal T4.  VT history as below:    Tachy Therapy History  - Resonate (11/2020 - present): 45Â ATP (w/44Â aborted shocks)   - 2/6/24: ATPx1 for VT (started on amio)   - 2/5/24: ATPx1 for VT   - 2/2/24: ATPx1 with aborted shock for VT falling in VF zone  - 1/24/24: ATPx1 for VT Â   -12/28/23 ATPx1 terminated VT episode rate 214  11/20/23: ATPx3 (3) separate VF episodes in the 220's with ATP successful each time.   - 11/4/23: ATPx1 terminated 3s VT episode in the 220s  - 3/11 to 4/23/22: 37 VT episodes requiring ATP to terminate (shocks aborted)... metoprolol changed to sotalol     - Teligen (11/2008 - 11/2020): 7 ATP, 7 shocks, 1 diverted shock   - 12/21/18: ATP x1 (and aborted shock) for VT episode  - 12/2011: 2 SVT episodes (in the 150s) inappropriately treated with ATP... tachy zones adjusted  - 1/9 to 1/10/09: inappropriate ATP and shocks x6 for ST... tachy zones adjusted  - 11/2008 - 2009: per EPIC, \"He received an ICD in 2008 for sustained VT. He had 1 appropriate shock for VT in that year.\" No documentation " "in paceart regarding this episode.      Ecg today shows nsr with q-waves noted in inferior leads. Last month's echo showed EF 45% with inferior akinesis.    Recurrent scar related Vt despite being on sotalol but has been quiescent on amio. Discussed the option of continuing amio with risk of hypothyroidism vs. Ablation. Pt opter for the former. Will check device a month from now. Reduce amio to 200 mg daily if no VT requiring therapy. Check tsh and alt at upcoming [facility] appt in 3 months.           Review of Systems     12-pt ROS is negative except for as noted in the HPI.          Physical Exam     Vitals: /68   Pulse 81   Resp 17   Ht 1.727 m (5' 8\")   Wt 96.2 kg (212 lb)   SpO2 100%   BMI 32.23 kg/m    Wt Readings from Last 10 Encounters:   03/05/24 96.2 kg (212 lb)   02/21/24 96.7 kg (213 lb 1.6 oz)   07/01/22 88.9 kg (196 lb)   05/18/22 85.7 kg (189 lb)   11/17/20 97.5 kg (215 lb)   11/13/20 97.5 kg (215 lb)   01/22/20 110.1 kg (242 lb 11.2 oz)   10/08/18 117 kg (257 lb 15 oz)   10/26/17 117.1 kg (258 lb 1.6 oz)   09/29/17 118.8 kg (261 lb 12.8 oz)       Constitutional:  Patient is pleasant, alert, cooperative, and in NAD.  HEENT:  NCAT. PERRLA. EOM's intact.   Neck:  CVP appears normal. No carotid bruits.   Pulmonary: Normal respiratory effort. CTAB.   Cardiac: RRR, normal S1/S2, no S3/S4, no murmur or rub.   Abdomen:  Non-tender abdomen, no hepatosplenomegaly appreciated.   Vascular: Pulses in the upper and lower extremities are 2+ and equal bilaterally.  Extremities: No edema, erythema, cyanosis or tenderness appreciated. Left prosthesis.  Skin:  No rashes or lesions appreciated.   Neurological:  No gross motor or sensory deficits.   Psych: Appropriate affect.          Data   Labs reviewed:  Recent Labs   Lab Test 02/16/24  1146 04/04/19  0000   * 112   HDL 43 22   NHDL 189* 145   CHOL 232* 167   TRIG 143 163   TSH 5.78*  --        Lab Results   Component Value Date    WBC 7.6 02/16/2024    " WBC 6.1 11/17/2020    RBC 5.51 02/16/2024    RBC 4.75 11/17/2020    HGB 16.6 02/16/2024    HGB 14.3 11/17/2020    HCT 48.3 02/16/2024    HCT 42.5 11/17/2020    MCV 88 02/16/2024    MCV 90 11/17/2020    MCH 30.1 02/16/2024    MCH 30.1 11/17/2020    MCHC 34.4 02/16/2024    MCHC 33.6 11/17/2020    RDW 13.3 02/16/2024    RDW 13.6 11/17/2020     02/16/2024     11/17/2020       Lab Results   Component Value Date     02/16/2024     11/17/2020    POTASSIUM 3.9 02/16/2024    POTASSIUM 4.1 11/17/2020    CHLORIDE 98 02/16/2024    CHLORIDE 105 07/15/2022    CHLORIDE 109 11/17/2020    CO2 19 (L) 02/16/2024    CO2 23 11/17/2020    ANIONGAP 20 (H) 02/16/2024    ANIONGAP 8 11/17/2020     (H) 02/16/2024     (H) 11/17/2020    BUN 19.5 02/16/2024    BUN 18 11/17/2020    CR 1.11 02/16/2024    CR 0.68 11/17/2020    GFRESTIMATED 71 02/16/2024    GFRESTIMATED >90 11/17/2020    GFRESTBLACK >90 11/17/2020    CLARI 9.8 02/16/2024    CLAIR 9.6 11/17/2020      Lab Results   Component Value Date    AST 23 02/16/2024    AST 67 (H) 10/08/2018    ALT 21 02/16/2024     (H) 10/08/2018       Lab Results   Component Value Date    A1C 6.6 (H) 02/16/2024    A1C 7.5 (H) 10/05/2018       Lab Results   Component Value Date    INR 1.18 (H) 11/13/2008    INR 1.11 11/09/2008            Problem List     Patient Active Problem List   Diagnosis    Ventricular tachycardia (H)    Coronary artery disease involving native coronary artery of native heart without angina pectoris    Hyperlipidemia    PAD (peripheral artery disease) (H24)    ICD (implantable cardioverter-defibrillator) in place    Encounter for servicing of implantable cardioverter-defibrillator (ICD) at end of battery life    Ischemic cardiomyopathy    Benign essential hypertension            Medications     Current Outpatient Medications   Medication Sig Dispense Refill    acetaminophen (TYLENOL) 500 MG tablet Take 500-1,000 mg by mouth every 6 hours as  needed for mild pain      amiodarone (PACERONE) 200 MG tablet Take 1 tablet (200 mg) by mouth 2 times daily 60 tablet 3    Aspirin (ASPIR-81 PO) Take 81 mg by mouth daily       fexofenadine (ALLEGRA) 180 MG tablet Take 180 mg by mouth daily      furosemide (LASIX) 20 MG tablet Take 2 tablets (40 mg) by mouth daily 180 tablet 3    insulin aspart (NOVOLOG PEN) 100 UNIT/ML pen Inject 8 Units Subcutaneous 3 times daily (with meals)       insulin glargine (BASAGLAR KWIKPEN) 100 UNIT/ML pen Inject 16 Units Subcutaneous at bedtime      insulin pen needle (30G X 8 MM) 30G X 8 MM miscellaneous Use 5 pen needles daily or as directed.      lisinopril (ZESTRIL) 40 MG tablet Take 0.5 tablets (20 mg) by mouth daily 50 tablet 4    metFORMIN (GLUCOPHAGE-XR) 500 MG 24 hr tablet Take 500 mg by mouth daily (with dinner)      nitroGLYcerin (NITROSTAT) 0.4 MG sublingual tablet Place 0.4 mg under the tongue every 5 minutes as needed for chest pain For chest pain place 1 tablet under the tongue every 5 minutes for 3 doses. If symptoms persist 5 minutes after 1st dose call 911.      spironolactone (ALDACTONE) 25 MG tablet Take 3 tablets (75 mg) by mouth every morning 300 tablet 4            Past Medical History     Past Medical History:   Diagnosis Date    Benign essential hypertension     Coronary artery disease     S/p CABG 11/2008 3 vessel w/ LIMA to LAD, SVG to Diagl, SVG to PDA    Diabetes mellitus (H)     History of coronary artery bypass graft x 3 2008    Hyperlipidemia     Ischemic cardiomyopathy 2008    MI (myocardial infarction) (H) 11/2008    inferior    PAD (peripheral artery disease) (H24) 12/31/2019    S/p angioplasty of right posterior tibial artery on 2/19/2019; s/p angioplasty of distal posterior tibial artery to right foot 4/4/19      Ventricular tachycardia (H)      Past Surgical History:   Procedure Laterality Date    BYPASS GRAFT ARTERY CORONARY  11/2008    3 vessel w/ LIMA to LAD, SVG to Diagl, SVG to PDA     ENDOSCOPIC RETROGRADE CHOLANGIOPANCREATOGRAM N/A 10/6/2018    Procedure: ENDOSCOPIC RETROGRADE CHOLANGIOPANCREATOGRAM;  ENDOSCOPIC ULTRASOUND, ENDOSCOPIC RETROGRADE CHOLANGIOPANCREATOGRAM (MAC);  Surgeon: Oc Yang MD;  Location:  OR    EP ICD GENERATOR REPLACEMENT DUAL N/A 11/17/2020    Procedure: EP ICD Generator Change Dual;  Surgeon: Coty Leonard MD;  Location:  HEART CARDIAC CATH LAB    ESOPHAGOSCOPY, GASTROSCOPY, DUODENOSCOPY (EGD), COMBINED N/A 10/6/2018    Procedure: COMBINED ENDOSCOPIC ULTRASOUND, ESOPHAGOSCOPY, GASTROSCOPY, DUODENOSCOPY (EGD);;  Surgeon: Oc Yang MD;  Location:  OR    HC LEFT HEART CATHETERIZATION  10/2008    Emergent thrombectomy and stent to distal RCA    IMPLANT IMPLANTABLE CARDIOVERTER DEFIBRILLATOR  11/2008    dual chamber ICD    IR LOWER EXTREMITY ANGIOGRAM LEFT  1/9/2019    IR LOWER EXTREMITY ANGIOGRAM RIGHT  12/20/2018    LAPAROSCOPIC CHOLECYSTECTOMY N/A 10/7/2018    Procedure: LAPAROSCOPIC CHOLECYSTECTOMY;   Laparoscopic Cholecystectomy;  Surgeon: Dhiraj Matute MD;  Location:  OR     Family History   Problem Relation Age of Onset    Cancer Brother     Diabetes No family hx of     Hypertension No family hx of      Social History     Socioeconomic History    Marital status:      Spouse name: Not on file    Number of children: Not on file    Years of education: Not on file    Highest education level: Not on file   Occupational History    Not on file   Tobacco Use    Smoking status: Never    Smokeless tobacco: Never   Substance and Sexual Activity    Alcohol use: No    Drug use: Not on file    Sexual activity: Not on file   Other Topics Concern    Parent/sibling w/ CABG, MI or angioplasty before 65F 55M? Not Asked   Social History Narrative    Not on file     Social Determinants of Health     Financial Resource Strain: Not on file   Food Insecurity: Not on file   Transportation Needs: Not on file   Physical Activity: Not on file    Stress: Not on file   Social Connections: Not on file   Interpersonal Safety: Not on file   Housing Stability: Not on file            Allergies   Patient has no known allergies.    Today's clinic visit entailed:  The following tests were independently interpreted by me as noted in my documentation: device check, echo  45 minutes spent by me on the date of the encounter doing chart review, review of test results, and patient visit   Provider  Link to [facility] Help Grid     The level of medical decision making during this visit was of moderate complexity.       Thank you for allowing me to participate in the care of your patient.      Sincerely,     Arik Conner MD     [facility]  cc:   Arik Conner MD  [address] [place] [id number]

## 2024-03-05 NOTE — LETTER
Go Saavedra    [address]  [place] [address]    June 14, 2024      Dear Go Saavedra,     Your transmission sent on 6/14/2024 has been reviewed. It was determined the results are normal. No events were detected.   ___xxx__ Your next scheduled date for you to send a transmission is on 9/16/2024.   _____ You are due for an in office appointment, please call [phone] to arrange.   Please call the Device technicians at [phone] to change your appointment if needed. You may call and leave a message for a device RN if you have any questions at [phone] and they will return your call. Device clinic hours: Monday - Friday  8:00am-4:00pm   Sincerely,   Device Clinic.

## 2024-04-08 ENCOUNTER — ANCILLARY PROCEDURE (OUTPATIENT)
Dept: CARDIOLOGY | Facility: CLINIC | Age: 74
End: 2024-04-08
Attending: INTERNAL MEDICINE
Payer: COMMERCIAL

## 2024-04-08 DIAGNOSIS — Z79.899 ENCOUNTER FOR MONITORING SOTALOL THERAPY: ICD-10-CM

## 2024-04-08 DIAGNOSIS — I50.41 ACUTE COMBINED SYSTOLIC (CONGESTIVE) AND DIASTOLIC (CONGESTIVE) HEART FAILURE (H): ICD-10-CM

## 2024-04-08 DIAGNOSIS — R60.0 LOWER EXTREMITY EDEMA: ICD-10-CM

## 2024-04-08 DIAGNOSIS — Z95.810 ICD (IMPLANTABLE CARDIOVERTER-DEFIBRILLATOR) IN PLACE: ICD-10-CM

## 2024-04-08 DIAGNOSIS — I47.20 VENTRICULAR TACHYCARDIA (H): ICD-10-CM

## 2024-04-08 DIAGNOSIS — I25.5 ISCHEMIC CARDIOMYOPATHY: ICD-10-CM

## 2024-04-08 DIAGNOSIS — Z51.81 ENCOUNTER FOR MONITORING SOTALOL THERAPY: ICD-10-CM

## 2024-04-08 DIAGNOSIS — I25.10 CORONARY ARTERY DISEASE INVOLVING NATIVE CORONARY ARTERY OF NATIVE HEART WITHOUT ANGINA PECTORIS: ICD-10-CM

## 2024-04-08 PROCEDURE — 99207 CARDIAC DEVICE CHECK - REMOTE: CPT | Performed by: INTERNAL MEDICINE

## 2024-04-08 NOTE — PROGRESS NOTES
"Courtesy Remote Device Check to assess for VT requiring therapy for Amiodarone dosage.     Per Dr. Lee 3/5/24:        Arik Lee MD   [facility] Heart Device Nurse  Hi, please set phone check in a month. If no vt requiring atp please reduce amio to 200 mg daily. Thanks, qp        [employer] Moment[employer] (S)  ICD Remote Device Check    : 0 %    Mode: VVI 40      Presenting Rhythm: VS 60-70    Historical Underlying Rhythm: SR 70s    Heart Rate: excellent variability, mostly from 50-100s per histogram    Sensing: WNL    Pacing Threshold: stable at 1.6V@0.40ms      Impedance: WNL    Battery Status: 12.5 years    Atrial Arrhythmia: none    Ventricular Arrhythmia: none    ATP: 0    Shocks: 0    Tachy Therapy History: secondary prevention  - Resonate (11/2020 - present): 45 ATP (w/44 aborted shocks)       - 2/6/24: ATPx1 for VT (started on amio)       - 2/5/24: ATPx1 for VT        - 2/2/24: ATPx1 with aborted shock for VT falling in VF zone       - 1/24/24: ATPx1 for VT         -12/28/23 ATPx1 terminated VT episode rate 214       -11/20/23: ATPx3 (3) separate VF episodes in the 220's with ATP successful each time.        - 11/4/23: ATPx1 terminated 3s VT episode in the 220s       - 3/11 to 4/23/22: 37 VT episodes requiring ATP to terminate (shocks aborted)... metoprolol changed to sotalol    - Teligen (11/2008 - 11/2020): 7 ATP, 7 shocks, 1 diverted shock       - 12/21/18: ATP x1 (and aborted shock) for VT episode       - 12/2011: 2 SVT episodes (in the 150s) inappropriately treated with ATP... tachy zones adjusted       - 1/9 to 1/10/09: inappropriate ATP and shocks x6 for ST... tachy zones adjusted       - 11/2008 - 2009: per EPIC, \"He received an ICD in 2008 for sustained VT. He had 1 appropriate shock for VT in that year.\" No documentation in paceart regarding this episode.  Care Plan: next scheduled remote on 6/6/24. Called pt to review findings and Dr. Lee's recommendations.NO ANSWER. LEFT MESSAGE " REQUESTING RETURN CALL TO DEVICE NURSE TO REVIEW MEDICATION CHANGES. PLEASE CORRECT IN MEDLIST ONCE PT VERIFIES CHANGES.  MICKEY Pineda

## 2024-04-09 LAB
MDC_IDC_LEAD_CONNECTION_STATUS: NORMAL
MDC_IDC_LEAD_IMPLANT_DT: NORMAL
MDC_IDC_LEAD_LOCATION: NORMAL
MDC_IDC_LEAD_LOCATION_DETAIL_1: NORMAL
MDC_IDC_LEAD_MFG: NORMAL
MDC_IDC_LEAD_MODEL: NORMAL
MDC_IDC_LEAD_POLARITY_TYPE: NORMAL
MDC_IDC_LEAD_SERIAL: NORMAL
MDC_IDC_MSMT_BATTERY_DTM: NORMAL
MDC_IDC_MSMT_BATTERY_REMAINING_LONGEVITY: 150 MO
MDC_IDC_MSMT_BATTERY_REMAINING_PERCENTAGE: 100 %
MDC_IDC_MSMT_BATTERY_STATUS: NORMAL
MDC_IDC_MSMT_CAP_CHARGE_DTM: NORMAL
MDC_IDC_MSMT_CAP_CHARGE_TIME: 9.9 S
MDC_IDC_MSMT_CAP_CHARGE_TYPE: NORMAL
MDC_IDC_MSMT_LEADCHNL_RV_IMPEDANCE_VALUE: 375 OHM
MDC_IDC_MSMT_LEADCHNL_RV_PACING_THRESHOLD_AMPLITUDE: 1.6 V
MDC_IDC_MSMT_LEADCHNL_RV_PACING_THRESHOLD_PULSEWIDTH: 0.4 MS
MDC_IDC_PG_IMPLANT_DTM: NORMAL
MDC_IDC_PG_MFG: NORMAL
MDC_IDC_PG_MODEL: NORMAL
MDC_IDC_PG_SERIAL: NORMAL
MDC_IDC_PG_TYPE: NORMAL
MDC_IDC_SESS_CLINIC_NAME: NORMAL
MDC_IDC_SESS_DTM: NORMAL
MDC_IDC_SESS_TYPE: NORMAL
MDC_IDC_SET_BRADY_LOWRATE: 40 {BEATS}/MIN
MDC_IDC_SET_BRADY_MODE: NORMAL
MDC_IDC_SET_LEADCHNL_RV_PACING_AMPLITUDE: 3.4 V
MDC_IDC_SET_LEADCHNL_RV_PACING_CAPTURE_MODE: NORMAL
MDC_IDC_SET_LEADCHNL_RV_PACING_POLARITY: NORMAL
MDC_IDC_SET_LEADCHNL_RV_PACING_PULSEWIDTH: 0.4 MS
MDC_IDC_SET_LEADCHNL_RV_SENSING_ADAPTATION_MODE: NORMAL
MDC_IDC_SET_LEADCHNL_RV_SENSING_POLARITY: NORMAL
MDC_IDC_SET_LEADCHNL_RV_SENSING_SENSITIVITY: 0.6 MV
MDC_IDC_SET_ZONE_DETECTION_INTERVAL: 300 MS
MDC_IDC_SET_ZONE_DETECTION_INTERVAL: 353 MS
MDC_IDC_SET_ZONE_DETECTION_INTERVAL: 400 MS
MDC_IDC_SET_ZONE_STATUS: NORMAL
MDC_IDC_SET_ZONE_TYPE: NORMAL
MDC_IDC_SET_ZONE_VENDOR_TYPE: NORMAL
MDC_IDC_STAT_BRADY_DTM_END: NORMAL
MDC_IDC_STAT_BRADY_DTM_START: NORMAL
MDC_IDC_STAT_BRADY_RV_PERCENT_PACED: 0 %
MDC_IDC_STAT_EPISODE_RECENT_COUNT: 0
MDC_IDC_STAT_EPISODE_RECENT_COUNT_DTM_END: NORMAL
MDC_IDC_STAT_EPISODE_RECENT_COUNT_DTM_START: NORMAL
MDC_IDC_STAT_EPISODE_TYPE: NORMAL
MDC_IDC_STAT_EPISODE_VENDOR_TYPE: NORMAL
MDC_IDC_STAT_TACHYTHERAPY_ATP_DELIVERED_RECENT: 0
MDC_IDC_STAT_TACHYTHERAPY_ATP_DELIVERED_TOTAL: 46
MDC_IDC_STAT_TACHYTHERAPY_RECENT_DTM_END: NORMAL
MDC_IDC_STAT_TACHYTHERAPY_RECENT_DTM_START: NORMAL
MDC_IDC_STAT_TACHYTHERAPY_SHOCKS_ABORTED_RECENT: 0
MDC_IDC_STAT_TACHYTHERAPY_SHOCKS_ABORTED_TOTAL: 45
MDC_IDC_STAT_TACHYTHERAPY_SHOCKS_DELIVERED_RECENT: 0
MDC_IDC_STAT_TACHYTHERAPY_SHOCKS_DELIVERED_TOTAL: 0
MDC_IDC_STAT_TACHYTHERAPY_TOTAL_DTM_END: NORMAL
MDC_IDC_STAT_TACHYTHERAPY_TOTAL_DTM_START: NORMAL

## 2024-04-09 RX ORDER — AMIODARONE HYDROCHLORIDE 200 MG/1
200 TABLET ORAL DAILY
COMMUNITY
Start: 2024-04-09 | End: 2024-08-02

## 2024-04-09 NOTE — PROGRESS NOTES
Pt left VM yesterday after clinic hours. He said he got the message to reduce his amiodarone dose and he states understanding.     Med list updated.

## 2024-06-06 ENCOUNTER — ANCILLARY PROCEDURE (OUTPATIENT)
Dept: CARDIOLOGY | Facility: CLINIC | Age: 74
End: 2024-06-06
Attending: INTERNAL MEDICINE
Payer: COMMERCIAL

## 2024-06-06 DIAGNOSIS — I25.10 CORONARY ARTERY DISEASE INVOLVING NATIVE CORONARY ARTERY OF NATIVE HEART WITHOUT ANGINA PECTORIS: ICD-10-CM

## 2024-06-06 DIAGNOSIS — R60.0 LOWER EXTREMITY EDEMA: ICD-10-CM

## 2024-06-06 DIAGNOSIS — I47.20 VENTRICULAR TACHYCARDIA (H): ICD-10-CM

## 2024-06-06 DIAGNOSIS — I50.41 ACUTE COMBINED SYSTOLIC (CONGESTIVE) AND DIASTOLIC (CONGESTIVE) HEART FAILURE (H): ICD-10-CM

## 2024-06-06 DIAGNOSIS — Z95.810 ICD (IMPLANTABLE CARDIOVERTER-DEFIBRILLATOR) IN PLACE: ICD-10-CM

## 2024-06-06 DIAGNOSIS — Z51.81 ENCOUNTER FOR MONITORING SOTALOL THERAPY: ICD-10-CM

## 2024-06-06 DIAGNOSIS — I25.5 ISCHEMIC CARDIOMYOPATHY: ICD-10-CM

## 2024-06-06 DIAGNOSIS — Z79.899 ENCOUNTER FOR MONITORING SOTALOL THERAPY: ICD-10-CM

## 2024-06-06 PROCEDURE — 93296 REM INTERROG EVL PM/IDS: CPT | Performed by: INTERNAL MEDICINE

## 2024-06-06 PROCEDURE — 93295 DEV INTERROG REMOTE 1/2/MLT: CPT | Performed by: INTERNAL MEDICINE

## 2024-06-10 LAB
MDC_IDC_EPISODE_DTM: NORMAL
MDC_IDC_EPISODE_DTM: NORMAL
MDC_IDC_EPISODE_ID: NORMAL
MDC_IDC_EPISODE_ID: NORMAL
MDC_IDC_EPISODE_TYPE: NORMAL
MDC_IDC_EPISODE_TYPE: NORMAL
MDC_IDC_LEAD_CONNECTION_STATUS: NORMAL
MDC_IDC_LEAD_IMPLANT_DT: NORMAL
MDC_IDC_LEAD_LOCATION: NORMAL
MDC_IDC_LEAD_LOCATION_DETAIL_1: NORMAL
MDC_IDC_LEAD_MFG: NORMAL
MDC_IDC_LEAD_MODEL: NORMAL
MDC_IDC_LEAD_POLARITY_TYPE: NORMAL
MDC_IDC_LEAD_SERIAL: NORMAL
MDC_IDC_MSMT_BATTERY_DTM: NORMAL
MDC_IDC_MSMT_BATTERY_REMAINING_LONGEVITY: 156 MO
MDC_IDC_MSMT_BATTERY_REMAINING_PERCENTAGE: 100 %
MDC_IDC_MSMT_BATTERY_STATUS: NORMAL
MDC_IDC_MSMT_CAP_CHARGE_DTM: NORMAL
MDC_IDC_MSMT_CAP_CHARGE_TIME: 10 S
MDC_IDC_MSMT_CAP_CHARGE_TYPE: NORMAL
MDC_IDC_MSMT_LEADCHNL_RV_IMPEDANCE_VALUE: 358 OHM
MDC_IDC_MSMT_LEADCHNL_RV_PACING_THRESHOLD_AMPLITUDE: 1.8 V
MDC_IDC_MSMT_LEADCHNL_RV_PACING_THRESHOLD_PULSEWIDTH: 0.4 MS
MDC_IDC_PG_IMPLANT_DTM: NORMAL
MDC_IDC_PG_MFG: NORMAL
MDC_IDC_PG_MODEL: NORMAL
MDC_IDC_PG_SERIAL: NORMAL
MDC_IDC_PG_TYPE: NORMAL
MDC_IDC_SESS_CLINIC_NAME: NORMAL
MDC_IDC_SESS_DTM: NORMAL
MDC_IDC_SESS_TYPE: NORMAL
MDC_IDC_SET_BRADY_LOWRATE: 40 {BEATS}/MIN
MDC_IDC_SET_BRADY_MODE: NORMAL
MDC_IDC_SET_LEADCHNL_RV_PACING_AMPLITUDE: 3.6 V
MDC_IDC_SET_LEADCHNL_RV_PACING_CAPTURE_MODE: NORMAL
MDC_IDC_SET_LEADCHNL_RV_PACING_POLARITY: NORMAL
MDC_IDC_SET_LEADCHNL_RV_PACING_PULSEWIDTH: 0.4 MS
MDC_IDC_SET_LEADCHNL_RV_SENSING_ADAPTATION_MODE: NORMAL
MDC_IDC_SET_LEADCHNL_RV_SENSING_POLARITY: NORMAL
MDC_IDC_SET_LEADCHNL_RV_SENSING_SENSITIVITY: 0.6 MV
MDC_IDC_SET_ZONE_DETECTION_INTERVAL: 300 MS
MDC_IDC_SET_ZONE_DETECTION_INTERVAL: 353 MS
MDC_IDC_SET_ZONE_DETECTION_INTERVAL: 400 MS
MDC_IDC_SET_ZONE_STATUS: NORMAL
MDC_IDC_SET_ZONE_TYPE: NORMAL
MDC_IDC_SET_ZONE_VENDOR_TYPE: NORMAL
MDC_IDC_STAT_BRADY_DTM_END: NORMAL
MDC_IDC_STAT_BRADY_DTM_START: NORMAL
MDC_IDC_STAT_BRADY_RV_PERCENT_PACED: 0 %
MDC_IDC_STAT_EPISODE_RECENT_COUNT: 0
MDC_IDC_STAT_EPISODE_RECENT_COUNT_DTM_END: NORMAL
MDC_IDC_STAT_EPISODE_RECENT_COUNT_DTM_START: NORMAL
MDC_IDC_STAT_EPISODE_TYPE: NORMAL
MDC_IDC_STAT_EPISODE_VENDOR_TYPE: NORMAL
MDC_IDC_STAT_TACHYTHERAPY_ATP_DELIVERED_RECENT: 0
MDC_IDC_STAT_TACHYTHERAPY_ATP_DELIVERED_TOTAL: 46
MDC_IDC_STAT_TACHYTHERAPY_RECENT_DTM_END: NORMAL
MDC_IDC_STAT_TACHYTHERAPY_RECENT_DTM_START: NORMAL
MDC_IDC_STAT_TACHYTHERAPY_SHOCKS_ABORTED_RECENT: 0
MDC_IDC_STAT_TACHYTHERAPY_SHOCKS_ABORTED_TOTAL: 45
MDC_IDC_STAT_TACHYTHERAPY_SHOCKS_DELIVERED_RECENT: 0
MDC_IDC_STAT_TACHYTHERAPY_SHOCKS_DELIVERED_TOTAL: 0
MDC_IDC_STAT_TACHYTHERAPY_TOTAL_DTM_END: NORMAL
MDC_IDC_STAT_TACHYTHERAPY_TOTAL_DTM_START: NORMAL

## 2024-06-14 ENCOUNTER — ANCILLARY PROCEDURE (OUTPATIENT)
Dept: CARDIOLOGY | Facility: CLINIC | Age: 74
End: 2024-06-14
Attending: INTERNAL MEDICINE
Payer: COMMERCIAL

## 2024-06-14 DIAGNOSIS — I25.10 CORONARY ARTERY DISEASE INVOLVING NATIVE CORONARY ARTERY OF NATIVE HEART WITHOUT ANGINA PECTORIS: ICD-10-CM

## 2024-06-14 DIAGNOSIS — Z79.899 ENCOUNTER FOR MONITORING SOTALOL THERAPY: ICD-10-CM

## 2024-06-14 DIAGNOSIS — I47.20 VENTRICULAR TACHYCARDIA (H): ICD-10-CM

## 2024-06-14 DIAGNOSIS — Z51.81 ENCOUNTER FOR MONITORING SOTALOL THERAPY: ICD-10-CM

## 2024-06-14 DIAGNOSIS — Z95.810 ICD (IMPLANTABLE CARDIOVERTER-DEFIBRILLATOR) IN PLACE: ICD-10-CM

## 2024-06-14 DIAGNOSIS — R60.0 LOWER EXTREMITY EDEMA: ICD-10-CM

## 2024-06-14 DIAGNOSIS — I25.5 ISCHEMIC CARDIOMYOPATHY: ICD-10-CM

## 2024-06-14 DIAGNOSIS — I50.41 ACUTE COMBINED SYSTOLIC (CONGESTIVE) AND DIASTOLIC (CONGESTIVE) HEART FAILURE (H): ICD-10-CM

## 2024-06-14 LAB
MDC_IDC_EPISODE_DTM: NORMAL
MDC_IDC_EPISODE_DTM: NORMAL
MDC_IDC_EPISODE_ID: NORMAL
MDC_IDC_EPISODE_ID: NORMAL
MDC_IDC_EPISODE_TYPE: NORMAL
MDC_IDC_EPISODE_TYPE: NORMAL
MDC_IDC_LEAD_CONNECTION_STATUS: NORMAL
MDC_IDC_LEAD_IMPLANT_DT: NORMAL
MDC_IDC_LEAD_LOCATION: NORMAL
MDC_IDC_LEAD_LOCATION_DETAIL_1: NORMAL
MDC_IDC_LEAD_MFG: NORMAL
MDC_IDC_LEAD_MODEL: NORMAL
MDC_IDC_LEAD_POLARITY_TYPE: NORMAL
MDC_IDC_LEAD_SERIAL: NORMAL
MDC_IDC_MSMT_BATTERY_DTM: NORMAL
MDC_IDC_MSMT_BATTERY_REMAINING_LONGEVITY: 150 MO
MDC_IDC_MSMT_BATTERY_REMAINING_PERCENTAGE: 100 %
MDC_IDC_MSMT_BATTERY_STATUS: NORMAL
MDC_IDC_MSMT_CAP_CHARGE_DTM: NORMAL
MDC_IDC_MSMT_CAP_CHARGE_TIME: 10 S
MDC_IDC_MSMT_CAP_CHARGE_TYPE: NORMAL
MDC_IDC_MSMT_LEADCHNL_RV_IMPEDANCE_VALUE: 373 OHM
MDC_IDC_MSMT_LEADCHNL_RV_PACING_THRESHOLD_AMPLITUDE: 1.6 V
MDC_IDC_MSMT_LEADCHNL_RV_PACING_THRESHOLD_PULSEWIDTH: 0.4 MS
MDC_IDC_PG_IMPLANT_DTM: NORMAL
MDC_IDC_PG_MFG: NORMAL
MDC_IDC_PG_MODEL: NORMAL
MDC_IDC_PG_SERIAL: NORMAL
MDC_IDC_PG_TYPE: NORMAL
MDC_IDC_SESS_CLINIC_NAME: NORMAL
MDC_IDC_SESS_DTM: NORMAL
MDC_IDC_SESS_TYPE: NORMAL
MDC_IDC_SET_BRADY_LOWRATE: 40 {BEATS}/MIN
MDC_IDC_SET_BRADY_MODE: NORMAL
MDC_IDC_SET_LEADCHNL_RV_PACING_AMPLITUDE: 3.4 V
MDC_IDC_SET_LEADCHNL_RV_PACING_CAPTURE_MODE: NORMAL
MDC_IDC_SET_LEADCHNL_RV_PACING_POLARITY: NORMAL
MDC_IDC_SET_LEADCHNL_RV_PACING_PULSEWIDTH: 0.4 MS
MDC_IDC_SET_LEADCHNL_RV_SENSING_ADAPTATION_MODE: NORMAL
MDC_IDC_SET_LEADCHNL_RV_SENSING_POLARITY: NORMAL
MDC_IDC_SET_LEADCHNL_RV_SENSING_SENSITIVITY: 0.6 MV
MDC_IDC_SET_ZONE_DETECTION_INTERVAL: 300 MS
MDC_IDC_SET_ZONE_DETECTION_INTERVAL: 353 MS
MDC_IDC_SET_ZONE_DETECTION_INTERVAL: 400 MS
MDC_IDC_SET_ZONE_STATUS: NORMAL
MDC_IDC_SET_ZONE_TYPE: NORMAL
MDC_IDC_SET_ZONE_VENDOR_TYPE: NORMAL
MDC_IDC_STAT_BRADY_DTM_END: NORMAL
MDC_IDC_STAT_BRADY_DTM_START: NORMAL
MDC_IDC_STAT_BRADY_RV_PERCENT_PACED: 0 %
MDC_IDC_STAT_EPISODE_RECENT_COUNT: 0
MDC_IDC_STAT_EPISODE_RECENT_COUNT_DTM_END: NORMAL
MDC_IDC_STAT_EPISODE_RECENT_COUNT_DTM_START: NORMAL
MDC_IDC_STAT_EPISODE_TYPE: NORMAL
MDC_IDC_STAT_EPISODE_VENDOR_TYPE: NORMAL
MDC_IDC_STAT_TACHYTHERAPY_ATP_DELIVERED_RECENT: 0
MDC_IDC_STAT_TACHYTHERAPY_ATP_DELIVERED_TOTAL: 46
MDC_IDC_STAT_TACHYTHERAPY_RECENT_DTM_END: NORMAL
MDC_IDC_STAT_TACHYTHERAPY_RECENT_DTM_START: NORMAL
MDC_IDC_STAT_TACHYTHERAPY_SHOCKS_ABORTED_RECENT: 0
MDC_IDC_STAT_TACHYTHERAPY_SHOCKS_ABORTED_TOTAL: 45
MDC_IDC_STAT_TACHYTHERAPY_SHOCKS_DELIVERED_RECENT: 0
MDC_IDC_STAT_TACHYTHERAPY_SHOCKS_DELIVERED_TOTAL: 0
MDC_IDC_STAT_TACHYTHERAPY_TOTAL_DTM_END: NORMAL
MDC_IDC_STAT_TACHYTHERAPY_TOTAL_DTM_START: NORMAL

## 2024-06-14 PROCEDURE — 93296 REM INTERROG EVL PM/IDS: CPT | Performed by: INTERNAL MEDICINE

## 2024-06-14 PROCEDURE — 93295 DEV INTERROG REMOTE 1/2/MLT: CPT | Performed by: INTERNAL MEDICINE

## 2024-08-02 DIAGNOSIS — I47.20 VENTRICULAR TACHYCARDIA (H): ICD-10-CM

## 2024-08-02 RX ORDER — AMIODARONE HYDROCHLORIDE 200 MG/1
200 TABLET ORAL DAILY
Qty: 90 TABLET | Refills: 3 | Status: SHIPPED | OUTPATIENT
Start: 2024-08-02

## 2024-08-02 NOTE — TELEPHONE ENCOUNTER
Received request from [facility] to refill patient's Amiodarone.  Refill request is for 200mg twice daily, however, per notes this was decreased to 200mg once daily on 4/9/24 after a remote check showing no VT requiring ATP.     Prescription for Amiodarone 200mg daily sent to [facility].     Patient called and message left updating him that of the above request and the updated prescription with the correct dosing that was sent in. Device clinic phone number provided for any questions.      MICKEY Thrasher

## 2024-09-16 ENCOUNTER — ANCILLARY PROCEDURE (OUTPATIENT)
Dept: CARDIOLOGY | Facility: CLINIC | Age: 74
End: 2024-09-16
Attending: INTERNAL MEDICINE
Payer: COMMERCIAL

## 2024-09-16 DIAGNOSIS — Z79.899 ENCOUNTER FOR MONITORING SOTALOL THERAPY: ICD-10-CM

## 2024-09-16 DIAGNOSIS — I50.41 ACUTE COMBINED SYSTOLIC (CONGESTIVE) AND DIASTOLIC (CONGESTIVE) HEART FAILURE (H): ICD-10-CM

## 2024-09-16 DIAGNOSIS — I47.20 VENTRICULAR TACHYCARDIA (H): ICD-10-CM

## 2024-09-16 DIAGNOSIS — I25.5 ISCHEMIC CARDIOMYOPATHY: ICD-10-CM

## 2024-09-16 DIAGNOSIS — I25.10 CORONARY ARTERY DISEASE INVOLVING NATIVE CORONARY ARTERY OF NATIVE HEART WITHOUT ANGINA PECTORIS: ICD-10-CM

## 2024-09-16 DIAGNOSIS — Z51.81 ENCOUNTER FOR MONITORING SOTALOL THERAPY: ICD-10-CM

## 2024-09-16 DIAGNOSIS — Z95.810 ICD (IMPLANTABLE CARDIOVERTER-DEFIBRILLATOR) IN PLACE: ICD-10-CM

## 2024-09-16 DIAGNOSIS — R60.0 LOWER EXTREMITY EDEMA: ICD-10-CM

## 2024-09-16 PROCEDURE — 93296 REM INTERROG EVL PM/IDS: CPT | Performed by: INTERNAL MEDICINE

## 2024-09-16 PROCEDURE — 93295 DEV INTERROG REMOTE 1/2/MLT: CPT | Performed by: INTERNAL MEDICINE

## 2024-09-17 DIAGNOSIS — I25.5 ISCHEMIC CARDIOMYOPATHY: ICD-10-CM

## 2024-09-17 DIAGNOSIS — I47.20 VENTRICULAR TACHYCARDIA (H): ICD-10-CM

## 2024-09-17 DIAGNOSIS — Z95.810 ICD (IMPLANTABLE CARDIOVERTER-DEFIBRILLATOR) IN PLACE: Primary | ICD-10-CM

## 2024-09-18 LAB
MDC_IDC_EPISODE_DTM: NORMAL
MDC_IDC_EPISODE_DTM: NORMAL
MDC_IDC_EPISODE_ID: NORMAL
MDC_IDC_EPISODE_ID: NORMAL
MDC_IDC_EPISODE_TYPE: NORMAL
MDC_IDC_EPISODE_TYPE: NORMAL
MDC_IDC_LEAD_CONNECTION_STATUS: NORMAL
MDC_IDC_LEAD_IMPLANT_DT: NORMAL
MDC_IDC_LEAD_LOCATION: NORMAL
MDC_IDC_LEAD_LOCATION_DETAIL_1: NORMAL
MDC_IDC_LEAD_MFG: NORMAL
MDC_IDC_LEAD_MODEL: NORMAL
MDC_IDC_LEAD_POLARITY_TYPE: NORMAL
MDC_IDC_LEAD_SERIAL: NORMAL
MDC_IDC_MSMT_BATTERY_DTM: NORMAL
MDC_IDC_MSMT_BATTERY_REMAINING_LONGEVITY: 150 MO
MDC_IDC_MSMT_BATTERY_REMAINING_PERCENTAGE: 100 %
MDC_IDC_MSMT_BATTERY_STATUS: NORMAL
MDC_IDC_MSMT_CAP_CHARGE_DTM: NORMAL
MDC_IDC_MSMT_CAP_CHARGE_TIME: 10 S
MDC_IDC_MSMT_CAP_CHARGE_TYPE: NORMAL
MDC_IDC_MSMT_LEADCHNL_RV_IMPEDANCE_VALUE: 371 OHM
MDC_IDC_MSMT_LEADCHNL_RV_PACING_THRESHOLD_AMPLITUDE: 1.4 V
MDC_IDC_MSMT_LEADCHNL_RV_PACING_THRESHOLD_PULSEWIDTH: 0.4 MS
MDC_IDC_PG_IMPLANT_DTM: NORMAL
MDC_IDC_PG_MFG: NORMAL
MDC_IDC_PG_MODEL: NORMAL
MDC_IDC_PG_SERIAL: NORMAL
MDC_IDC_PG_TYPE: NORMAL
MDC_IDC_SESS_CLINIC_NAME: NORMAL
MDC_IDC_SESS_DTM: NORMAL
MDC_IDC_SESS_TYPE: NORMAL
MDC_IDC_SET_BRADY_LOWRATE: 40 {BEATS}/MIN
MDC_IDC_SET_BRADY_MODE: NORMAL
MDC_IDC_SET_LEADCHNL_RV_PACING_AMPLITUDE: 3.2 V
MDC_IDC_SET_LEADCHNL_RV_PACING_CAPTURE_MODE: NORMAL
MDC_IDC_SET_LEADCHNL_RV_PACING_POLARITY: NORMAL
MDC_IDC_SET_LEADCHNL_RV_PACING_PULSEWIDTH: 0.4 MS
MDC_IDC_SET_LEADCHNL_RV_SENSING_ADAPTATION_MODE: NORMAL
MDC_IDC_SET_LEADCHNL_RV_SENSING_POLARITY: NORMAL
MDC_IDC_SET_LEADCHNL_RV_SENSING_SENSITIVITY: 0.6 MV
MDC_IDC_SET_ZONE_DETECTION_INTERVAL: 300 MS
MDC_IDC_SET_ZONE_DETECTION_INTERVAL: 353 MS
MDC_IDC_SET_ZONE_DETECTION_INTERVAL: 400 MS
MDC_IDC_SET_ZONE_STATUS: NORMAL
MDC_IDC_SET_ZONE_TYPE: NORMAL
MDC_IDC_SET_ZONE_VENDOR_TYPE: NORMAL
MDC_IDC_STAT_BRADY_DTM_END: NORMAL
MDC_IDC_STAT_BRADY_DTM_START: NORMAL
MDC_IDC_STAT_BRADY_RV_PERCENT_PACED: 0 %
MDC_IDC_STAT_EPISODE_RECENT_COUNT: 0
MDC_IDC_STAT_EPISODE_RECENT_COUNT_DTM_END: NORMAL
MDC_IDC_STAT_EPISODE_RECENT_COUNT_DTM_START: NORMAL
MDC_IDC_STAT_EPISODE_TYPE: NORMAL
MDC_IDC_STAT_EPISODE_VENDOR_TYPE: NORMAL
MDC_IDC_STAT_TACHYTHERAPY_ATP_DELIVERED_RECENT: 0
MDC_IDC_STAT_TACHYTHERAPY_ATP_DELIVERED_TOTAL: 46
MDC_IDC_STAT_TACHYTHERAPY_RECENT_DTM_END: NORMAL
MDC_IDC_STAT_TACHYTHERAPY_RECENT_DTM_START: NORMAL
MDC_IDC_STAT_TACHYTHERAPY_SHOCKS_ABORTED_RECENT: 0
MDC_IDC_STAT_TACHYTHERAPY_SHOCKS_ABORTED_TOTAL: 45
MDC_IDC_STAT_TACHYTHERAPY_SHOCKS_DELIVERED_RECENT: 0
MDC_IDC_STAT_TACHYTHERAPY_SHOCKS_DELIVERED_TOTAL: 0
MDC_IDC_STAT_TACHYTHERAPY_TOTAL_DTM_END: NORMAL
MDC_IDC_STAT_TACHYTHERAPY_TOTAL_DTM_START: NORMAL

## 2024-10-10 NOTE — TELEPHONE ENCOUNTER
Entered order for EKG in one week. Pt can have this done here in [place] at heart clinic or at [facility] (Edwina Rodriguez MD in [facility], would need to fax an order).     Called pt. Gave him instructions from Dr. Leonard. We discussed in more detail the VT and ATP he has been having, and how it has increased significantly in the past few days, and how if the rhythm were to go on longer he could get shocked by his ICD. Pt states understanding of the plan.     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 Pt said if this is the case he would probably be going to surgery on Monday next week, 3/21, so he may be still in the hospital and not able to get to the clinic for an EKG in 1 week. I told pt that I will be in touch with him early next week to figure out where he is and if he needs to have the EKG in the hospital he can have it done there. Pt agrees with this plan. He said if he's in the hospital I can talk to his wife Frances on the phone to arrange the EKG.     Sent prescription for sotalol to patient's pharmacy of choice. Pt just took his daily metoprolol dose now, so he will start sotalol tomorrow, so we should aim for EKG next week on Wednesday 3/23/2022. Set reminder to check in with pt next week.    Patient requests all Lab, Cardiology, and Radiology Results on their Discharge Instructions

## 2024-12-23 ENCOUNTER — ANCILLARY PROCEDURE (OUTPATIENT)
Dept: CARDIOLOGY | Facility: CLINIC | Age: 74
End: 2024-12-23
Attending: INTERNAL MEDICINE
Payer: COMMERCIAL

## 2024-12-23 DIAGNOSIS — I25.5 ISCHEMIC CARDIOMYOPATHY: ICD-10-CM

## 2024-12-23 DIAGNOSIS — I47.20 VENTRICULAR TACHYCARDIA (H): ICD-10-CM

## 2024-12-23 DIAGNOSIS — Z95.810 ICD (IMPLANTABLE CARDIOVERTER-DEFIBRILLATOR) IN PLACE: ICD-10-CM

## 2024-12-23 PROCEDURE — 93295 DEV INTERROG REMOTE 1/2/MLT: CPT | Performed by: INTERNAL MEDICINE

## 2024-12-23 PROCEDURE — 93296 REM INTERROG EVL PM/IDS: CPT | Performed by: INTERNAL MEDICINE

## 2025-01-02 LAB
MDC_IDC_EPISODE_DTM: NORMAL
MDC_IDC_EPISODE_DTM: NORMAL
MDC_IDC_EPISODE_ID: NORMAL
MDC_IDC_EPISODE_ID: NORMAL
MDC_IDC_EPISODE_TYPE: NORMAL
MDC_IDC_EPISODE_TYPE: NORMAL
MDC_IDC_LEAD_CONNECTION_STATUS: NORMAL
MDC_IDC_LEAD_IMPLANT_DT: NORMAL
MDC_IDC_LEAD_LOCATION: NORMAL
MDC_IDC_LEAD_LOCATION_DETAIL_1: NORMAL
MDC_IDC_LEAD_MFG: NORMAL
MDC_IDC_LEAD_MODEL: NORMAL
MDC_IDC_LEAD_POLARITY_TYPE: NORMAL
MDC_IDC_LEAD_SERIAL: NORMAL
MDC_IDC_MSMT_BATTERY_DTM: NORMAL
MDC_IDC_MSMT_BATTERY_REMAINING_LONGEVITY: 144 MO
MDC_IDC_MSMT_BATTERY_REMAINING_PERCENTAGE: 100 %
MDC_IDC_MSMT_BATTERY_STATUS: NORMAL
MDC_IDC_MSMT_CAP_CHARGE_DTM: NORMAL
MDC_IDC_MSMT_CAP_CHARGE_TIME: 10.1 S
MDC_IDC_MSMT_CAP_CHARGE_TYPE: NORMAL
MDC_IDC_MSMT_LEADCHNL_RV_IMPEDANCE_VALUE: 380 OHM
MDC_IDC_MSMT_LEADCHNL_RV_PACING_THRESHOLD_AMPLITUDE: 1.5 V
MDC_IDC_MSMT_LEADCHNL_RV_PACING_THRESHOLD_PULSEWIDTH: 0.4 MS
MDC_IDC_PG_IMPLANT_DTM: NORMAL
MDC_IDC_PG_MFG: NORMAL
MDC_IDC_PG_MODEL: NORMAL
MDC_IDC_PG_SERIAL: NORMAL
MDC_IDC_PG_TYPE: NORMAL
MDC_IDC_SESS_CLINIC_NAME: NORMAL
MDC_IDC_SESS_DTM: NORMAL
MDC_IDC_SESS_TYPE: NORMAL
MDC_IDC_SET_BRADY_LOWRATE: 40 {BEATS}/MIN
MDC_IDC_SET_BRADY_MODE: NORMAL
MDC_IDC_SET_LEADCHNL_RV_PACING_AMPLITUDE: 3.4 V
MDC_IDC_SET_LEADCHNL_RV_PACING_CAPTURE_MODE: NORMAL
MDC_IDC_SET_LEADCHNL_RV_PACING_POLARITY: NORMAL
MDC_IDC_SET_LEADCHNL_RV_PACING_PULSEWIDTH: 0.4 MS
MDC_IDC_SET_LEADCHNL_RV_SENSING_ADAPTATION_MODE: NORMAL
MDC_IDC_SET_LEADCHNL_RV_SENSING_POLARITY: NORMAL
MDC_IDC_SET_LEADCHNL_RV_SENSING_SENSITIVITY: 0.6 MV
MDC_IDC_SET_ZONE_DETECTION_INTERVAL: 300 MS
MDC_IDC_SET_ZONE_DETECTION_INTERVAL: 353 MS
MDC_IDC_SET_ZONE_DETECTION_INTERVAL: 400 MS
MDC_IDC_SET_ZONE_STATUS: NORMAL
MDC_IDC_SET_ZONE_TYPE: NORMAL
MDC_IDC_SET_ZONE_VENDOR_TYPE: NORMAL
MDC_IDC_STAT_BRADY_DTM_END: NORMAL
MDC_IDC_STAT_BRADY_DTM_START: NORMAL
MDC_IDC_STAT_BRADY_RV_PERCENT_PACED: 0 %
MDC_IDC_STAT_EPISODE_RECENT_COUNT: 0
MDC_IDC_STAT_EPISODE_RECENT_COUNT_DTM_END: NORMAL
MDC_IDC_STAT_EPISODE_RECENT_COUNT_DTM_START: NORMAL
MDC_IDC_STAT_EPISODE_TYPE: NORMAL
MDC_IDC_STAT_EPISODE_VENDOR_TYPE: NORMAL
MDC_IDC_STAT_TACHYTHERAPY_ATP_DELIVERED_RECENT: 0
MDC_IDC_STAT_TACHYTHERAPY_ATP_DELIVERED_TOTAL: 46
MDC_IDC_STAT_TACHYTHERAPY_RECENT_DTM_END: NORMAL
MDC_IDC_STAT_TACHYTHERAPY_RECENT_DTM_START: NORMAL
MDC_IDC_STAT_TACHYTHERAPY_SHOCKS_ABORTED_RECENT: 0
MDC_IDC_STAT_TACHYTHERAPY_SHOCKS_ABORTED_TOTAL: 45
MDC_IDC_STAT_TACHYTHERAPY_SHOCKS_DELIVERED_RECENT: 0
MDC_IDC_STAT_TACHYTHERAPY_SHOCKS_DELIVERED_TOTAL: 0
MDC_IDC_STAT_TACHYTHERAPY_TOTAL_DTM_END: NORMAL
MDC_IDC_STAT_TACHYTHERAPY_TOTAL_DTM_START: NORMAL

## 2025-04-16 ENCOUNTER — ANCILLARY PROCEDURE (OUTPATIENT)
Dept: CARDIOLOGY | Facility: CLINIC | Age: 75
End: 2025-04-16
Attending: INTERNAL MEDICINE
Payer: COMMERCIAL

## 2025-04-16 DIAGNOSIS — I47.20 VENTRICULAR TACHYCARDIA (H): ICD-10-CM

## 2025-04-16 DIAGNOSIS — Z95.810 ICD (IMPLANTABLE CARDIOVERTER-DEFIBRILLATOR) IN PLACE: ICD-10-CM

## 2025-04-16 DIAGNOSIS — I25.5 ISCHEMIC CARDIOMYOPATHY: ICD-10-CM

## 2025-04-16 PROCEDURE — 93296 REM INTERROG EVL PM/IDS: CPT | Performed by: INTERNAL MEDICINE

## 2025-04-16 PROCEDURE — 93295 DEV INTERROG REMOTE 1/2/MLT: CPT | Performed by: INTERNAL MEDICINE

## 2025-04-21 LAB
MDC_IDC_EPISODE_DTM: NORMAL
MDC_IDC_EPISODE_DURATION: 19 S
MDC_IDC_EPISODE_ID: NORMAL
MDC_IDC_EPISODE_TYPE: NORMAL
MDC_IDC_EPISODE_TYPE_INDUCED: NO
MDC_IDC_EPISODE_VENDOR_TYPE: NORMAL
MDC_IDC_LEAD_CONNECTION_STATUS: NORMAL
MDC_IDC_LEAD_IMPLANT_DT: NORMAL
MDC_IDC_LEAD_LOCATION: NORMAL
MDC_IDC_LEAD_LOCATION_DETAIL_1: NORMAL
MDC_IDC_LEAD_MFG: NORMAL
MDC_IDC_LEAD_MODEL: NORMAL
MDC_IDC_LEAD_POLARITY_TYPE: NORMAL
MDC_IDC_LEAD_SERIAL: NORMAL
MDC_IDC_MSMT_BATTERY_DTM: NORMAL
MDC_IDC_MSMT_BATTERY_REMAINING_LONGEVITY: 144 MO
MDC_IDC_MSMT_BATTERY_REMAINING_PERCENTAGE: 100 %
MDC_IDC_MSMT_BATTERY_STATUS: NORMAL
MDC_IDC_MSMT_CAP_CHARGE_DTM: NORMAL
MDC_IDC_MSMT_CAP_CHARGE_TIME: 10.1 S
MDC_IDC_MSMT_CAP_CHARGE_TYPE: NORMAL
MDC_IDC_MSMT_LEADCHNL_RV_IMPEDANCE_VALUE: 395 OHM
MDC_IDC_MSMT_LEADCHNL_RV_PACING_THRESHOLD_AMPLITUDE: 1.6 V
MDC_IDC_MSMT_LEADCHNL_RV_PACING_THRESHOLD_PULSEWIDTH: 0.4 MS
MDC_IDC_PG_IMPLANT_DTM: NORMAL
MDC_IDC_PG_MFG: NORMAL
MDC_IDC_PG_MODEL: NORMAL
MDC_IDC_PG_SERIAL: NORMAL
MDC_IDC_PG_TYPE: NORMAL
MDC_IDC_SESS_CLINIC_NAME: NORMAL
MDC_IDC_SESS_DTM: NORMAL
MDC_IDC_SESS_TYPE: NORMAL
MDC_IDC_SET_BRADY_LOWRATE: 40 {BEATS}/MIN
MDC_IDC_SET_BRADY_MODE: NORMAL
MDC_IDC_SET_LEADCHNL_RV_PACING_AMPLITUDE: 3.4 V
MDC_IDC_SET_LEADCHNL_RV_PACING_CAPTURE_MODE: NORMAL
MDC_IDC_SET_LEADCHNL_RV_PACING_POLARITY: NORMAL
MDC_IDC_SET_LEADCHNL_RV_PACING_PULSEWIDTH: 0.4 MS
MDC_IDC_SET_LEADCHNL_RV_SENSING_ADAPTATION_MODE: NORMAL
MDC_IDC_SET_LEADCHNL_RV_SENSING_POLARITY: NORMAL
MDC_IDC_SET_LEADCHNL_RV_SENSING_SENSITIVITY: 0.6 MV
MDC_IDC_SET_ZONE_DETECTION_INTERVAL: 300 MS
MDC_IDC_SET_ZONE_DETECTION_INTERVAL: 353 MS
MDC_IDC_SET_ZONE_DETECTION_INTERVAL: 400 MS
MDC_IDC_SET_ZONE_STATUS: NORMAL
MDC_IDC_SET_ZONE_TYPE: NORMAL
MDC_IDC_SET_ZONE_VENDOR_TYPE: NORMAL
MDC_IDC_STAT_BRADY_DTM_END: NORMAL
MDC_IDC_STAT_BRADY_DTM_START: NORMAL
MDC_IDC_STAT_BRADY_RV_PERCENT_PACED: 0 %
MDC_IDC_STAT_EPISODE_RECENT_COUNT: 0
MDC_IDC_STAT_EPISODE_RECENT_COUNT_DTM_END: NORMAL
MDC_IDC_STAT_EPISODE_RECENT_COUNT_DTM_START: NORMAL
MDC_IDC_STAT_EPISODE_TYPE: NORMAL
MDC_IDC_STAT_EPISODE_VENDOR_TYPE: NORMAL
MDC_IDC_STAT_TACHYTHERAPY_ATP_DELIVERED_RECENT: 0
MDC_IDC_STAT_TACHYTHERAPY_ATP_DELIVERED_TOTAL: 46
MDC_IDC_STAT_TACHYTHERAPY_RECENT_DTM_END: NORMAL
MDC_IDC_STAT_TACHYTHERAPY_RECENT_DTM_START: NORMAL
MDC_IDC_STAT_TACHYTHERAPY_SHOCKS_ABORTED_RECENT: 0
MDC_IDC_STAT_TACHYTHERAPY_SHOCKS_ABORTED_TOTAL: 45
MDC_IDC_STAT_TACHYTHERAPY_SHOCKS_DELIVERED_RECENT: 0
MDC_IDC_STAT_TACHYTHERAPY_SHOCKS_DELIVERED_TOTAL: 0
MDC_IDC_STAT_TACHYTHERAPY_TOTAL_DTM_END: NORMAL
MDC_IDC_STAT_TACHYTHERAPY_TOTAL_DTM_START: NORMAL

## (undated) DEVICE — ENDO TROCAR FIRST ENTRY KII FIOS Z-THRD 11X100MM CTF33

## (undated) DEVICE — SUCTION CANISTER MEDIVAC LINER 3000ML W/LID 65651-530

## (undated) DEVICE — SUCTION IRR STRYKERFLOW II W/TIP 250-070-520

## (undated) DEVICE — SOL NACL 0.9% INJ 1000ML BAG 2B1324X

## (undated) DEVICE — ESU GROUND PAD ADULT W/CORD E7507

## (undated) DEVICE — ICD MOMENTUM EL DR D121: Type: IMPLANTABLE DEVICE | Status: NON-FUNCTIONAL

## (undated) DEVICE — ENDO POUCH UNIV RETRIEVAL SYSTEM INZII 10MM CD001

## (undated) DEVICE — CLIP APPLIER ENDO 5MM M/L LIGAMAX EL5ML

## (undated) DEVICE — PACK PCMKR PERM SRG PROC LF SAN32PC573

## (undated) DEVICE — SYR 01ML 25GA 5/8" TBC

## (undated) DEVICE — SU VICRYL 0 UR-6 27" J603H

## (undated) DEVICE — ESU GROUND PAD UNIVERSAL W/O CORD

## (undated) DEVICE — LINEN TOWEL PACK X5 5464

## (undated) DEVICE — DEFIB PRO-PADZ LVP LQD GEL ADULT 8900-2105-01

## (undated) DEVICE — BASIN SET MINOR DISP

## (undated) DEVICE — CUP AND LID 2PK 2OZ STERILE  SSK9006A

## (undated) DEVICE — PACK SET-UP STD 9102

## (undated) DEVICE — GLOVE PROTEXIS W/NEU-THERA 7.5  2D73TE75

## (undated) DEVICE — SUCTION CANISTER STRAW 65652-008

## (undated) DEVICE — SYR 10ML LL W/O NDL

## (undated) DEVICE — DEVICE SUTURE GRASPER TROCAR CLOSURE 14GA PMITCSG

## (undated) DEVICE — SU VICRYL 4-0 PS-2 18" UND J496H

## (undated) DEVICE — RAD RX OPTIRAY 320 (50ML) IOVERSOL 68% CHARGE PER ML

## (undated) DEVICE — ENDO TROCAR FIRST ENTRY KII FIOS Z-THRD 05X100MM CTF03

## (undated) DEVICE — LINEN BASIC PACK 5468

## (undated) DEVICE — SOL WATER IRRIG 1000ML BOTTLE 2F7114

## (undated) DEVICE — SYR 20ML LL W/O NDL

## (undated) DEVICE — SU VICRYL 0 ENDOLOOP 18" EJ10

## (undated) DEVICE — CONNECTOR STOPCOCK 3 WAY MALE LL HI-FLO MX9311L

## (undated) DEVICE — SOL WATER IRRIG 1000ML BOTTLE 07139-09

## (undated) DEVICE — SYR 03ML 22GA 1.5" ECLIPSE

## (undated) DEVICE — SOL NACL 0.9% IRRIG 1000ML BOTTLE 2F7124

## (undated) DEVICE — PEN MARKING SKIN W/LABELS 31145884

## (undated) DEVICE — ESU HOLDER LAP INST DISP PURPLE LONG 330MM H-PRO-330

## (undated) DEVICE — PREP CHLORAPREP 26ML TINTED ORANGE  260815

## (undated) DEVICE — PACK LAP CHOLE SLC15LCFSD

## (undated) DEVICE — GLOVE PROTEXIS BLUE W/NEU-THERA 7.5  2D73EB75

## (undated) DEVICE — ENDO TROCAR SLEEVE KII Z-THREADED 05X100MM CTS02

## (undated) RX ORDER — HEPARIN SODIUM 1000 [USP'U]/ML
INJECTION, SOLUTION INTRAVENOUS; SUBCUTANEOUS
Status: DISPENSED
Start: 2017-10-27

## (undated) RX ORDER — FENTANYL CITRATE 50 UG/ML
INJECTION, SOLUTION INTRAMUSCULAR; INTRAVENOUS
Status: DISPENSED
Start: 2017-10-27

## (undated) RX ORDER — CEFAZOLIN SODIUM 2 G/100ML
INJECTION, SOLUTION INTRAVENOUS
Status: DISPENSED
Start: 2020-11-17

## (undated) RX ORDER — LIDOCAINE HYDROCHLORIDE 10 MG/ML
INJECTION, SOLUTION EPIDURAL; INFILTRATION; INTRACAUDAL; PERINEURAL
Status: DISPENSED
Start: 2020-11-17

## (undated) RX ORDER — PROPOFOL 10 MG/ML
INJECTION, EMULSION INTRAVENOUS
Status: DISPENSED
Start: 2018-10-07

## (undated) RX ORDER — EPINEPHRINE 1 MG/ML
INJECTION, SOLUTION INTRAMUSCULAR; SUBCUTANEOUS
Status: DISPENSED
Start: 2018-10-07

## (undated) RX ORDER — NEOSTIGMINE METHYLSULFATE 1 MG/ML
VIAL (ML) INJECTION
Status: DISPENSED
Start: 2018-10-07

## (undated) RX ORDER — FENTANYL CITRATE 50 UG/ML
INJECTION, SOLUTION INTRAMUSCULAR; INTRAVENOUS
Status: DISPENSED
Start: 2020-11-17

## (undated) RX ORDER — LIDOCAINE HYDROCHLORIDE 20 MG/ML
INJECTION, SOLUTION EPIDURAL; INFILTRATION; INTRACAUDAL; PERINEURAL
Status: DISPENSED
Start: 2018-10-07

## (undated) RX ORDER — FENTANYL CITRATE 50 UG/ML
INJECTION, SOLUTION INTRAMUSCULAR; INTRAVENOUS
Status: DISPENSED
Start: 2018-10-07

## (undated) RX ORDER — NITROGLYCERIN 5 MG/ML
VIAL (ML) INTRAVENOUS
Status: DISPENSED
Start: 2017-10-27

## (undated) RX ORDER — ONDANSETRON 2 MG/ML
INJECTION INTRAMUSCULAR; INTRAVENOUS
Status: DISPENSED
Start: 2018-10-07

## (undated) RX ORDER — GLYCOPYRROLATE 0.2 MG/ML
INJECTION, SOLUTION INTRAMUSCULAR; INTRAVENOUS
Status: DISPENSED
Start: 2018-10-07

## (undated) RX ORDER — HYDROMORPHONE HYDROCHLORIDE 1 MG/ML
INJECTION, SOLUTION INTRAMUSCULAR; INTRAVENOUS; SUBCUTANEOUS
Status: DISPENSED
Start: 2018-10-07

## (undated) RX ORDER — NITROGLYCERIN 0.4 MG/1
TABLET SUBLINGUAL
Status: DISPENSED
Start: 2017-10-27

## (undated) RX ORDER — BUPIVACAINE HYDROCHLORIDE 5 MG/ML
INJECTION, SOLUTION EPIDURAL; INTRACAUDAL
Status: DISPENSED
Start: 2020-11-17

## (undated) RX ORDER — REGADENOSON 0.08 MG/ML
INJECTION, SOLUTION INTRAVENOUS
Status: DISPENSED
Start: 2017-10-27

## (undated) RX ORDER — BUPIVACAINE HYDROCHLORIDE 2.5 MG/ML
INJECTION, SOLUTION EPIDURAL; INFILTRATION; INTRACAUDAL
Status: DISPENSED
Start: 2018-10-07

## (undated) RX ORDER — LABETALOL HYDROCHLORIDE 5 MG/ML
INJECTION, SOLUTION INTRAVENOUS
Status: DISPENSED
Start: 2018-10-07

## (undated) RX ORDER — VERAPAMIL HYDROCHLORIDE 2.5 MG/ML
INJECTION, SOLUTION INTRAVENOUS
Status: DISPENSED
Start: 2017-10-27

## (undated) RX ORDER — DEXAMETHASONE SODIUM PHOSPHATE 4 MG/ML
INJECTION, SOLUTION INTRA-ARTICULAR; INTRALESIONAL; INTRAMUSCULAR; INTRAVENOUS; SOFT TISSUE
Status: DISPENSED
Start: 2018-10-07

## (undated) RX ORDER — LIDOCAINE HYDROCHLORIDE 10 MG/ML
INJECTION, SOLUTION EPIDURAL; INFILTRATION; INTRACAUDAL; PERINEURAL
Status: DISPENSED
Start: 2017-10-27

## (undated) RX ORDER — FENTANYL CITRATE 50 UG/ML
INJECTION, SOLUTION INTRAMUSCULAR; INTRAVENOUS
Status: DISPENSED
Start: 2018-10-06